# Patient Record
Sex: MALE | Race: WHITE | NOT HISPANIC OR LATINO | Employment: UNEMPLOYED
[De-identification: names, ages, dates, MRNs, and addresses within clinical notes are randomized per-mention and may not be internally consistent; named-entity substitution may affect disease eponyms.]

---
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
## 2018-02-23 ENCOUNTER — TELEPHONE (OUTPATIENT)
Dept: GASTROENTEROLOGY | Facility: CLINIC | Age: 18
End: 2018-02-23

## 2018-02-23 DIAGNOSIS — Q87.89 CAROLI SYNDROME: Primary | ICD-10-CM

## 2018-02-23 NOTE — TELEPHONE ENCOUNTER
Spoke to Mom. Solomon is scheduled for abdominal ultrasound on Thursday, 8/23 at 0930. [facility]. NPO 8 hours prior. Mom verbalized understanding.       DOV Logan RNCC

## 2018-05-08 DIAGNOSIS — I15.8 OTHER SECONDARY HYPERTENSION: ICD-10-CM

## 2018-05-08 RX ORDER — HYDROCHLOROTHIAZIDE 25 MG/1
25 TABLET ORAL 2 TIMES DAILY
Qty: 180 TABLET | Refills: 1 | Status: SHIPPED | OUTPATIENT
Start: 2018-05-08 | End: 2018-08-27

## 2018-08-23 ENCOUNTER — OFFICE VISIT (OUTPATIENT)
Dept: NEPHROLOGY | Facility: CLINIC | Age: 18
End: 2018-08-23
Payer: COMMERCIAL

## 2018-08-23 ENCOUNTER — HOSPITAL ENCOUNTER (OUTPATIENT)
Dept: ULTRASOUND IMAGING | Facility: CLINIC | Age: 18
Discharge: HOME | End: 2018-08-23
Attending: PEDIATRICS | Admitting: PEDIATRICS
Payer: COMMERCIAL

## 2018-08-23 VITALS
WEIGHT: 221.56 LBS | DIASTOLIC BLOOD PRESSURE: 73 MMHG | HEART RATE: 95 BPM | HEIGHT: 70 IN | SYSTOLIC BLOOD PRESSURE: 117 MMHG | BODY MASS INDEX: 31.72 KG/M2

## 2018-08-23 DIAGNOSIS — F41.1 GENERALIZED ANXIETY DISORDER: ICD-10-CM

## 2018-08-23 DIAGNOSIS — F90.9 ATTENTION DEFICIT HYPERACTIVITY DISORDER (ADHD), UNSPECIFIED ADHD TYPE: ICD-10-CM

## 2018-08-23 DIAGNOSIS — Q87.89 CAROLI SYNDROME: ICD-10-CM

## 2018-08-23 DIAGNOSIS — N18.2 CKD (CHRONIC KIDNEY DISEASE) STAGE 2, GFR 60-89 ML/MIN: ICD-10-CM

## 2018-08-23 DIAGNOSIS — Q61.19 AUTOSOMAL RECESSIVE POLYCYSTIC KIDNEY DISEASE AND CONGENITAL HEPATIC FIBROSIS (ARPKD/CHF): Primary | ICD-10-CM

## 2018-08-23 DIAGNOSIS — I15.1 HYPERTENSION SECONDARY TO OTHER RENAL DISORDERS: ICD-10-CM

## 2018-08-23 DIAGNOSIS — F39 MOOD DISORDER (H): ICD-10-CM

## 2018-08-23 LAB
AFP SERPL-MCNC: 2.1 UG/L (ref 0–8)
ALBUMIN SERPL-MCNC: 4.4 G/DL (ref 3.4–5)
ALP SERPL-CCNC: 74 U/L (ref 40–150)
ALT SERPL W P-5'-P-CCNC: 32 U/L (ref 0–70)
ANION GAP SERPL CALCULATED.3IONS-SCNC: 7 MMOL/L (ref 3–14)
AST SERPL W P-5'-P-CCNC: 19 U/L (ref 0–45)
BILIRUB DIRECT SERPL-MCNC: <0.1 MG/DL (ref 0–0.2)
BILIRUB SERPL-MCNC: 0.6 MG/DL (ref 0.2–1.3)
BUN SERPL-MCNC: 28 MG/DL (ref 7–30)
CALCIUM SERPL-MCNC: 9.8 MG/DL (ref 8.5–10.1)
CHLORIDE SERPL-SCNC: 103 MMOL/L (ref 94–109)
CHOLEST SERPL-MCNC: 232 MG/DL
CO2 SERPL-SCNC: 28 MMOL/L (ref 20–32)
CREAT SERPL-MCNC: 1.51 MG/DL (ref 0.66–1.25)
DEPRECATED CALCIDIOL+CALCIFEROL SERPL-MC: 42 UG/L (ref 20–75)
ERYTHROCYTE [DISTWIDTH] IN BLOOD BY AUTOMATED COUNT: 12.5 % (ref 10–15)
GFR SERPL CREATININE-BSD FRML MDRD: 59 ML/MIN/1.7M2
GGT SERPL-CCNC: 40 U/L (ref 0–75)
GLUCOSE SERPL-MCNC: 91 MG/DL (ref 70–99)
HBA1C MFR BLD: 5.9 % (ref 0–5.6)
HCT VFR BLD AUTO: 44.2 % (ref 40–53)
HDLC SERPL-MCNC: 34 MG/DL
HGB BLD-MCNC: 14.9 G/DL (ref 13.3–17.7)
LDLC SERPL CALC-MCNC: 145 MG/DL
MCH RBC QN AUTO: 29.7 PG (ref 26.5–33)
MCHC RBC AUTO-ENTMCNC: 33.7 G/DL (ref 31.5–36.5)
MCV RBC AUTO: 88 FL (ref 78–100)
NONHDLC SERPL-MCNC: 198 MG/DL
PHOSPHATE SERPL-MCNC: 3.1 MG/DL (ref 2.5–4.5)
PLATELET # BLD AUTO: 251 10E9/L (ref 150–450)
POTASSIUM SERPL-SCNC: 4 MMOL/L (ref 3.4–5.3)
PROLACTIN SERPL-MCNC: 34 UG/L (ref 2–18)
PROT SERPL-MCNC: 8.6 G/DL (ref 6.8–8.8)
PTH-INTACT SERPL-MCNC: 10 PG/ML (ref 18–80)
RBC # BLD AUTO: 5.01 10E12/L (ref 4.4–5.9)
SODIUM SERPL-SCNC: 139 MMOL/L (ref 133–144)
TRIGL SERPL-MCNC: 267 MG/DL
WBC # BLD AUTO: 7 10E9/L (ref 4–11)

## 2018-08-23 PROCEDURE — 76700 US EXAM ABDOM COMPLETE: CPT

## 2018-08-23 NOTE — PATIENT INSTRUCTIONS
[facility]  [facility] [place]      Pediatric Call Center [phone], option 1  Inez Collier RN Care Coordinator:  [phone]    After Hours Emergency:  [phone].  Ask for the on-call pediatric doctor for the specialty you are calling for be paged.    Prescription Renewals:  Your pharmacy must fax requests to [phone].  Please allow 2-3 days for prescriptions to be authorized.    If your physician has ordered an CT or MRI, you may schedule this test by calling [facility] [place] at [phone].

## 2018-08-23 NOTE — NURSING NOTE
"[facility] [[id number]]  Chief Complaint   Patient presents with     Kidney Problem     Follow-up on CKD.     Initial /73 (BP Location: Right arm, Patient Position: Sitting, Cuff Size: Adult Large)  Pulse 95  Ht 5' 10.35\" (178.7 cm)  Wt 221 lb 9 oz (100.5 kg)  BMI 31.47 kg/m2 Estimated body mass index is 31.47 kg/(m^2) as calculated from the following:    Height as of this encounter: 5' 10.35\" (178.7 cm).    Weight as of this encounter: 221 lb 9 oz (100.5 kg).  Medication Reconciliation: complete    "

## 2018-08-23 NOTE — LETTER
8/23/2018      RE: Solomon Wilcox  [address] [facility] [address]       Return Visit for ARPKD, CKD stage II, hypertension       Chief Complaint:  Chief Complaint   Patient presents with     Kidney Problem     Follow-up on CKD.       HPI:    I had the pleasure of seeing Solomon Wilcox in the Pediatric Nephrology Clinic today for follow-up of ARPKD, CKD stage II, hypertension. Solomon is a 18 year old male accompanied by his mother.  Solomon Wilcox was last seen in the renal clinic on 8/10/17. Since then he has been doing well with no hospitalizations and no surgeries. Solomon says he is taking his medications when he is at school, however mom has doubts and knows that he missed at least 2 days this Summer. Continues to have significant conflict with mother, particularly around trust issues. He is not having fatigue, gross hematuria, UTIs, abdominal pain, headaches, back pain. Labs were requested from Dr. Laird's office and performed today. Growth chart was reviewed. He has gained ~6.7kg in the past year. Solomon was unable to name either of his blood pressure medications.     Review of Systems:  A comprehensive review of systems was performed and found to be negative other than noted in the HPI.    Allergies:  Solomon has No Known Allergies..    Active Medications:  Current Outpatient Prescriptions   Medication Sig Dispense Refill     cholecalciferol (VITAMIN  -D) 1000 UNITS capsule Take 1 capsule by mouth daily       citalopram (CELEXA) 40 MG tablet Take 1 tablet by mouth daily.       cyanocolbalamin (VITAMIN  B-12) 500 MCG tablet Take 500 mcg by mouth daily       guanFACINE HCl (INTUNIV) 3 MG TB24 Take 1 tablet by mouth daily.       hydrochlorothiazide (HYDRODIURIL) 25 MG tablet Take 1 tablet (25 mg) by mouth 2 times daily 180 tablet 1     lisinopril (PRINIVIL/ZESTRIL) 10 MG tablet Take 1 tablet (10 mg) by mouth daily 90 tablet 1     risperiDONE (RISPERDAL) 0.5 MG tablet Take 0.5 mg by mouth 2 times daily    "    ursodiol (ACTIGALL) 300 MG capsule Take 1 capsule (300 mg) by mouth 2 times daily 240 capsule 4     adapalene (DIFFERIN) 0.1 % cream Apply topically At Bedtime Apply as directed          Immunizations:    There is no immunization history on file for this patient.     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     Liver disease, cystic, congenital      Short stature     Treated with growth hormone     Unspecified hypertensive kidney disease with chronic kidney disease stage I through stage IV, or unspecified(403.90)          PSHx:    Past Surgical History:   Procedure Laterality Date     LAPAROSCOPIC CHOLECYSTECTOMY N/A 10/24/2014    Procedure: LAPAROSCOPIC CHOLECYSTECTOMY;  Surgeon: David Gibson MD;  Location: [facility] OR     NOSE SURGERY         FHx:  Family History   Problem Relation Age of Onset     Hypertension Father      started in his 20s     Lipids Father      high cholesterol     Myocardial Infarction Paternal Grandfather        SHx:  Social History   Substance Use Topics     Smoking status: Never Smoker     Smokeless tobacco: Never Used     Alcohol use No     Social History     Social History Narrative    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  He lives at home with his parents and younger sister in the Summer. He graduated from high school and attended [employer] last year and will start again in the Fall.        Physical Exam:    /73 (BP Location: Right arm, Patient Position: Sitting, Cuff Size: Adult Large)  Pulse 95  Ht 5' 10.35\" (178.7 cm)  Wt 221 lb 9 oz (100.5 kg)  BMI 31.47 kg/m2     Exam:  Constitutional: healthy, alert and no distress  Head: Normocephalic. No masses, lesions, or abnormalities  Neck: Neck supple. No adenopathy. Thyroid symmetric, " normal size,  EYE: VANESSA, EOMI, no periorbital edema  ENT: ENT exam normal, no neck nodes   Cardiovascular: negative, RRR. No murmurs, clicks gallops or rub  Respiratory: negative,  Lungs clear  Gastrointestinal: Abdomen soft, non-tender. BS normal. No masses, organomegaly, kidneys not palpable  : Deferred  Musculoskeletal: extremities normal- no gross deformities noted, gait normal and normal muscle tone  Skin: no suspicious lesions or rashes  Neurologic: Gait normal.   Psychiatric: mentation appears normal and affect normal    Labs and Imaging:  Results for orders placed or performed in visit on 08/23/18   Renal panel   Result Value Ref Range    Sodium 139 133 - 144 mmol/L    Potassium 4.0 3.4 - 5.3 mmol/L    Chloride 103 94 - 109 mmol/L    Carbon Dioxide 28 20 - 32 mmol/L    Anion Gap 7 3 - 14 mmol/L    Glucose 91 70 - 99 mg/dL    Urea Nitrogen 28 7 - 30 mg/dL    Creatinine 1.51 (H) 0.66 - 1.25 mg/dL    GFR Estimate 59 (L) >60 mL/min/1.7m2    GFR Estimate If Black 71 >60 mL/min/1.7m2    Calcium 9.8 8.5 - 10.1 mg/dL    Phosphorus 3.1 2.5 - 4.5 mg/dL    Albumin 4.4 3.4 - 5.0 g/dL   CBC with platelets   Result Value Ref Range    WBC 7.0 4.0 - 11.0 10e9/L    RBC Count 5.01 4.4 - 5.9 10e12/L    Hemoglobin 14.9 13.3 - 17.7 g/dL    Hematocrit 44.2 40.0 - 53.0 %    MCV 88 78 - 100 fl    MCH 29.7 26.5 - 33.0 pg    MCHC 33.7 31.5 - 36.5 g/dL    RDW 12.5 10.0 - 15.0 %    Platelet Count 251 150 - 450 10e9/L   Hemoglobin A1c   Result Value Ref Range    Hemoglobin A1C 5.9 (H) 0 - 5.6 %   Lipid panel   Result Value Ref Range    Cholesterol 232 (H) <200 mg/dL    Triglycerides 267 (H) <150 mg/dL    HDL Cholesterol 34 (L) >39 mg/dL    LDL Cholesterol Calculated 145 (H) <100 mg/dL    Non HDL Cholesterol 198 (H) <130 mg/dL   Prolactin   Result Value Ref Range    Prolactin 34 (H) 2 - 18 ug/L   Alkaline phosphatase   Result Value Ref Range    Alkaline Phosphatase 74 40 - 150 U/L   ALT   Result Value Ref Range    ALT 32 0 - 70 U/L    AST   Result Value Ref Range    AST 19 0 - 45 U/L   Bilirubin  total   Result Value Ref Range    Bilirubin Total 0.6 0.2 - 1.3 mg/dL   Bilirubin direct   Result Value Ref Range    Bilirubin Direct <0.1 0.0 - 0.2 mg/dL   Protein total   Result Value Ref Range    Protein Total 8.6 6.8 - 8.8 g/dL   Parathyroid Hormone Intact   Result Value Ref Range    Parathyroid Hormone Intact 10 (L) 18 - 80 pg/mL   Vitamin D Deficiency   Result Value Ref Range    Vitamin D Deficiency screening 42 20 - 75 ug/L   GGT   Result Value Ref Range    GGT 40 0 - 75 U/L   AFP tumor marker   Result Value Ref Range    Alpha Fetoprotein 2.1 0 - 8 ug/L     EXAMINATION: US ABDOMEN COMPLETE WITH DOPPLER, 8/23/2018 10:12 AM      COMPARISON: MR 8/4/2017     HISTORY: Caroli syndrome     Findings:  Liver: The liver demonstrates normal homogeneous echotexture. No  evidence of a focal hepatic mass.      Extrahepatic portal vein flow is antegrade, measuring 34 cm/sec.  Right portal vein flow is antegrade, measuring 32 cm/sec.  Left portal vein flow is antegrade, measuring 92 cm/sec.     Flow in the hepatic artery is towards the liver and:  62 cm/sec peak systolic  0.59 resistive index.      The splenic vein is patent and flow is towards the liver.  The left,  middle, and right hepatic veins are patent with flow towards the IVC.  The IVC is patent with flow towards the heart.   The visualized aorta  is not dilated.     Gallbladder: The gallbladder has been surgically removed.     Bile Ducts: Redemonstration of cystic dilation of the intrahepatic  bile ducts, measuring up to 6.2 x 7.0 x 7.9 cm. The common bile duct  measures 5.8 mm in diameter     Pancreas: Visualized portions of the head and body of the pancreas are  unremarkable.      Kidneys: Both kidneys are of normal echotexture, without  hydronephrosis. Multiple simple cysts in each kidney, measuring up to  1.8 cm on the right and 1.7 cm on the left. The craniocaudal  dimensions are: right- 12.0 cm,  left- 10.6 cm.     Spleen: The spleen measures 12.5 cm in sagittal dimension.     Fluid: No evidence of ascites or pleural effusions.         Impression:   1.  Hepatomegaly with ductal dilatation, compatible with Caroli's.  Doppler evaluation is normal.  2.  Stable borderline to mildly dilated extrahepatic bile duct.  3.  Polycystic kidney disease.  4.  Postoperative changes of cholecystectomy.     I personally reviewed results of laboratory evaluation, imaging studies and past medical records that were available during this outpatient visit.      Assessment and Plan:    Solomon is a 18 year old young man with ARPKD who is doing well medically.   1. CKD stage II due to ARPKD: Estimated GFR is 66 ml/min/1.73m2 by CKD-EPI equation and similar to a year ago. Creatinine has been very stable over the past few years. Recheck labs every 6 months (lab only appointment). I will plan to see him back in renal clinic in 1 year.  He has no evidence for anemia or hyperparathyroidism related to his CKD. He was encourage to learn the names of his medications and what they do. We reviewed his blood pressure medications today. Solomon needs to start taking control of his medications including filling his med box, calling for refills, and taking his medications without reminders.   2. Hypertension with CKD stage II: Blood pressures are under good control on lisinopril and HCTZ. Goal blood pressures are <120/80. He should have blood pressures checked at each clinic visit. He does not have evidence for hyperkalemia as a side effect of his lisinopril. He is not having dizziness or lightheadedness. He should stay well hydrated as he is at risk of acute kidney injury if dehydrated while taking lisinopril.   3. Liver cysts: Followed by Dr. Talavera in GI clinic.  Continue urodiol per GI. Follow up appointment in 2 weeks. Labs obtained today.   4. Splenic cysts: Seen on MRI  5. Elevated cholesterol: Recommend follow up with internal medicine or  "family practice provider to establish primary care and to determine if lipid lowering therapy would be beneficial.   6. Transition: We discussed transition planning. Solomon can transition to an adult nephrologist at any time. I recommend providers at the [facility] Nephrology clinic. I am able to see him for one more visit in 1 year if he would like to.        Labs requested by Dr. Laird were faxed to his office.      Patient Education: During this visit I discussed in detail the patient s symptoms, physical exam and evaluation results findings, tentative diagnosis as well as the treatment plan (Including but not limited to possible side effects and complications related to the disease, treatment modalities and intervention(s). Family expressed understanding and consent. Family was receptive and ready to learn; no apparent learning barriers were identified.    Follow up: Return in about 1 year (around 8/23/2019). Please return sooner should Solomon become symptomatic.          Sincerely,    Taina Ni MD   Pediatric Nephrology    CC:   Patient Care Team:  Brando Yost as PCP - General  Abhinav Laird MD as MD (Psychiatry)      Copy to patient  MORGAN CARLOS Daniel \"Adarsh\"  [address] [facility] [address]      "

## 2018-08-23 NOTE — MR AVS SNAPSHOT
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  Ask for the on-call pediatric doctor for the specialty you are calling for be paged.    Prescription Renewals:  Your pharmacy must fax requests to [phone].  Please allow 2-3 days for prescriptions to be authorized.    If your physician has ordered an CT or MRI, you may schedule this test by calling [facility] in [place] at [phone].            Follow-ups after your visit        Follow-up notes from your care team     Return in about 1 year (around 2019).      Your next 10 appointments already scheduled     Aug 23, 2018  9:30 AM CDT   US [facility]BDOM[facility] URUS[facility]   [facility]    [address] [place] [address] [phone]           Please bring a list of your medicines (including vitamins, minerals and over-the-counter drugs). Also, tell your doctor about any allergies you may have. Wear comfortable clothes and leave your valuables at home.  Adults: No eating or drinking for 8 hours before the exam. You may take medicine with a small sip of water.   "Children: - Infants, breast-fed: may have breast milk up to 2 hours before exam. - Infants, formula: may have bottle until 4 hours before exam. - Children 1-5 years: No food or drink for 4 hours before exam. - Children 6 -12 years: No food or drink for 6 hours before exam. - Children over 12 years: No food or drink for 8 hours before exam. - J Tube Fed: No need to stop feedings.  Please call the Imaging Department at your exam site with any questions.            Aug 28, 2018  4:00 PM CDT   Return Visit with Shelby Talavera MD   [facility] ([facility])    [address]  [address]  [facility] [address] [phone]              Who to contact     Please call your clinic at [phone] to:    Ask questions about your health    Make or cancel appointments    Discuss your medicines    Learn about your test results    Speak to your doctor            Additional Information About Your Visit        [employer] Information     [employer] gives you secure access to your electronic health record. If you see a primary care provider, you can also send messages to your care team and make appointments. If you have questions, please call your primary care clinic.  If you do not have a primary care provider, please call [phone] and they will assist you.      [employer] is an electronic gateway that provides easy, online access to your medical records. With [employer], you can request a clinic appointment, read your test results, renew a prescription or communicate with your care team.     To access your existing account, please contact your [facility] or call [phone] for assistance.        Care EveryWhere ID     This is your Care EveryWhere ID. This could be used by other organizations to access your [facility] medical records  [id number]        Your Vitals Were     Pulse Height BMI (Body Mass Index)             95 5' 10.35\" (178.7 cm) " 31.47 kg/m2          Blood Pressure from Last 3 Encounters:   08/23/18 117/73   08/23/17 121/66   08/10/17 119/77    Weight from Last 3 Encounters:   08/23/18 221 lb 9 oz (100.5 kg)   08/23/17 206 lb 12.7 oz (93.8 kg) (94 %)*   08/10/17 202 lb 2.6 oz (91.7 kg) (93 %)*     * Growth percentiles are based on [facility] 2-20 Years data.              We Performed the Following     AFP tumor marker     Alkaline phosphatase     ALT     AST     Bilirubin  total     Bilirubin direct     CBC with platelets     GGT     Hemoglobin A1c     Lipid panel     Parathyroid Hormone Intact     Prolactin     Protein total     Renal panel     Vitamin D Deficiency        Primary Care Provider Office Phone # Fax #    Brando Yost [phone]       [facility] [address] [address]        Equal Access to Services     MAJOR BUCK : Hadii sunil Ramos, waaxda luqaracely, qaybta kaalmada violet, sergio garcia . So [facility] [phone].    ATENCIÓN: Si habla español, tiene a keyes disposición servicios gratuitos de asistencia lingüística. Oscar al [phone].    We comply with applicable federal civil rights laws and [place] laws. We do not discriminate on the basis of race, color, national origin, age, disability, sex, sexual orientation, or gender identity.            Thank you!     Thank you for choosing [facility]  for your care. Our goal is always to provide you with excellent care. Hearing back from our patients is one way we can continue to improve our services. Please take a few minutes to complete the written survey that you may receive in the mail after your visit with us. Thank you!             Your Updated Medication List - Protect others around you: Learn how to safely use, store and throw away your medicines at www.disposemymeds.org.          This list is accurate as of 8/23/18  8:34 AM.  Always use your most recent  med list.                   Brand Name Dispense Instructions for use Diagnosis    adapalene 0.1 % cream    DIFFERIN     Apply topically At Bedtime Apply as directed        cholecalciferol 1000 units capsule    vitamin  -D     Take 1 capsule by mouth daily        citalopram 40 MG tablet    celeXA     Take 1 tablet by mouth daily.        cyanocolbalamin 500 MCG tablet    vitamin  B-12     Take 500 mcg by mouth daily        hydrochlorothiazide 25 MG tablet    HYDRODIURIL    180 tablet    Take 1 tablet (25 mg) by mouth 2 times daily    Other secondary hypertension       INTUNIV 3 MG Tb24 24 hr tablet   Generic drug:  guanFACINE HCl      Take 1 tablet by mouth daily.        lisinopril 10 MG tablet    PRINIVIL/ZESTRIL    90 tablet    Take 1 tablet (10 mg) by mouth daily    Other secondary hypertension       risperiDONE 0.5 MG tablet    risperDAL     Take 0.5 mg by mouth 2 times daily        ursodiol 300 MG capsule    ACTIGALL    240 capsule    Take 1 capsule (300 mg) by mouth 2 times daily    Polycystic kidney disease

## 2018-08-23 NOTE — PROGRESS NOTES
Return Visit for ARPKD, CKD stage II, hypertension       Chief Complaint:  Chief Complaint   Patient presents with     Kidney Problem     Follow-up on CKD.       HPI:    I had the pleasure of seeing Solomon Wilcox in the Pediatric Nephrology Clinic today for follow-up of ARPKD, CKD stage II, hypertension. Solomon is a 18 year old male accompanied by his mother.  Solomon Wilcox was last seen in the renal clinic on 8/10/17. Since then he has been doing well with no hospitalizations and no surgeries. Solomon says he is taking his medications when he is at school, however mom has doubts and knows that he missed at least 2 days this Summer. Continues to have significant conflict with mother, particularly around trust issues. He is not having fatigue, gross hematuria, UTIs, abdominal pain, headaches, back pain. Labs were requested from Dr. Laird's office and performed today. Growth chart was reviewed. He has gained ~6.7kg in the past year. Solomon was unable to name either of his blood pressure medications.     Review of Systems:  A comprehensive review of systems was performed and found to be negative other than noted in the HPI.    Allergies:  Solomon has No Known Allergies..    Active Medications:  Current Outpatient Prescriptions   Medication Sig Dispense Refill     cholecalciferol (VITAMIN  -D) 1000 UNITS capsule Take 1 capsule by mouth daily       citalopram (CELEXA) 40 MG tablet Take 1 tablet by mouth daily.       cyanocolbalamin (VITAMIN  B-12) 500 MCG tablet Take 500 mcg by mouth daily       guanFACINE HCl (INTUNIV) 3 MG TB24 Take 1 tablet by mouth daily.       hydrochlorothiazide (HYDRODIURIL) 25 MG tablet Take 1 tablet (25 mg) by mouth 2 times daily 180 tablet 1     lisinopril (PRINIVIL/ZESTRIL) 10 MG tablet Take 1 tablet (10 mg) by mouth daily 90 tablet 1     risperiDONE (RISPERDAL) 0.5 MG tablet Take 0.5 mg by mouth 2 times daily       ursodiol (ACTIGALL) 300 MG capsule Take 1 capsule (300 mg) by mouth 2 times daily 240  "capsule 4     adapalene (DIFFERIN) 0.1 % cream Apply topically At Bedtime Apply as directed          Immunizations:    There is no immunization history on file for this patient.     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     Liver disease, cystic, congenital      Short stature     Treated with growth hormone     Unspecified hypertensive kidney disease with chronic kidney disease stage I through stage IV, or unspecified(403.90)          PSHx:    Past Surgical History:   Procedure Laterality Date     LAPAROSCOPIC CHOLECYSTECTOMY N/A 10/24/2014    Procedure: LAPAROSCOPIC CHOLECYSTECTOMY;  Surgeon: David Gibson MD;  Location: [facility] OR     NOSE SURGERY         FHx:  Family History   Problem Relation Age of Onset     Hypertension Father      started in his 20s     Lipids Father      high cholesterol     Myocardial Infarction Paternal Grandfather        SHx:  Social History   Substance Use Topics     Smoking status: Never Smoker     Smokeless tobacco: Never Used     Alcohol use No     Social History     Social History Narrative    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  He lives at home with his parents and younger sister in the Summer. He graduated from high school and attended [employer] last year and will start again in the Fall.        Physical Exam:    /73 (BP Location: Right arm, Patient Position: Sitting, Cuff Size: Adult Large)  Pulse 95  Ht 5' 10.35\" (178.7 cm)  Wt 221 lb 9 oz (100.5 kg)  BMI 31.47 kg/m2     Exam:  Constitutional: healthy, alert and no distress  Head: Normocephalic. No masses, lesions, or abnormalities  Neck: Neck supple. No adenopathy. Thyroid symmetric, normal size,  EYE: VANESSA, EOMI, no periorbital edema  ENT: ENT exam normal, no neck nodes "   Cardiovascular: negative, RRR. No murmurs, clicks gallops or rub  Respiratory: negative,  Lungs clear  Gastrointestinal: Abdomen soft, non-tender. BS normal. No masses, organomegaly, kidneys not palpable  : Deferred  Musculoskeletal: extremities normal- no gross deformities noted, gait normal and normal muscle tone  Skin: no suspicious lesions or rashes  Neurologic: Gait normal.   Psychiatric: mentation appears normal and affect normal    Labs and Imaging:  Results for orders placed or performed in visit on 08/23/18   Renal panel   Result Value Ref Range    Sodium 139 133 - 144 mmol/L    Potassium 4.0 3.4 - 5.3 mmol/L    Chloride 103 94 - 109 mmol/L    Carbon Dioxide 28 20 - 32 mmol/L    Anion Gap 7 3 - 14 mmol/L    Glucose 91 70 - 99 mg/dL    Urea Nitrogen 28 7 - 30 mg/dL    Creatinine 1.51 (H) 0.66 - 1.25 mg/dL    GFR Estimate 59 (L) >60 mL/min/1.7m2    GFR Estimate If Black 71 >60 mL/min/1.7m2    Calcium 9.8 8.5 - 10.1 mg/dL    Phosphorus 3.1 2.5 - 4.5 mg/dL    Albumin 4.4 3.4 - 5.0 g/dL   CBC with platelets   Result Value Ref Range    WBC 7.0 4.0 - 11.0 10e9/L    RBC Count 5.01 4.4 - 5.9 10e12/L    Hemoglobin 14.9 13.3 - 17.7 g/dL    Hematocrit 44.2 40.0 - 53.0 %    MCV 88 78 - 100 fl    MCH 29.7 26.5 - 33.0 pg    MCHC 33.7 31.5 - 36.5 g/dL    RDW 12.5 10.0 - 15.0 %    Platelet Count 251 150 - 450 10e9/L   Hemoglobin A1c   Result Value Ref Range    Hemoglobin A1C 5.9 (H) 0 - 5.6 %   Lipid panel   Result Value Ref Range    Cholesterol 232 (H) <200 mg/dL    Triglycerides 267 (H) <150 mg/dL    HDL Cholesterol 34 (L) >39 mg/dL    LDL Cholesterol Calculated 145 (H) <100 mg/dL    Non HDL Cholesterol 198 (H) <130 mg/dL   Prolactin   Result Value Ref Range    Prolactin 34 (H) 2 - 18 ug/L   Alkaline phosphatase   Result Value Ref Range    Alkaline Phosphatase 74 40 - 150 U/L   ALT   Result Value Ref Range    ALT 32 0 - 70 U/L   AST   Result Value Ref Range    AST 19 0 - 45 U/L   Bilirubin  total   Result Value Ref  Range    Bilirubin Total 0.6 0.2 - 1.3 mg/dL   Bilirubin direct   Result Value Ref Range    Bilirubin Direct <0.1 0.0 - 0.2 mg/dL   Protein total   Result Value Ref Range    Protein Total 8.6 6.8 - 8.8 g/dL   Parathyroid Hormone Intact   Result Value Ref Range    Parathyroid Hormone Intact 10 (L) 18 - 80 pg/mL   Vitamin D Deficiency   Result Value Ref Range    Vitamin D Deficiency screening 42 20 - 75 ug/L   GGT   Result Value Ref Range    GGT 40 0 - 75 U/L   AFP tumor marker   Result Value Ref Range    Alpha Fetoprotein 2.1 0 - 8 ug/L     EXAMINATION: US ABDOMEN COMPLETE WITH DOPPLER, 8/23/2018 10:12 AM      COMPARISON: MR 8/4/2017     HISTORY: Caroli syndrome     Findings:  Liver: The liver demonstrates normal homogeneous echotexture. No  evidence of a focal hepatic mass.      Extrahepatic portal vein flow is antegrade, measuring 34 cm/sec.  Right portal vein flow is antegrade, measuring 32 cm/sec.  Left portal vein flow is antegrade, measuring 92 cm/sec.     Flow in the hepatic artery is towards the liver and:  62 cm/sec peak systolic  0.59 resistive index.      The splenic vein is patent and flow is towards the liver.  The left,  middle, and right hepatic veins are patent with flow towards the IVC.  The IVC is patent with flow towards the heart.   The visualized aorta  is not dilated.     Gallbladder: The gallbladder has been surgically removed.     Bile Ducts: Redemonstration of cystic dilation of the intrahepatic  bile ducts, measuring up to 6.2 x 7.0 x 7.9 cm. The common bile duct  measures 5.8 mm in diameter     Pancreas: Visualized portions of the head and body of the pancreas are  unremarkable.      Kidneys: Both kidneys are of normal echotexture, without  hydronephrosis. Multiple simple cysts in each kidney, measuring up to  1.8 cm on the right and 1.7 cm on the left. The craniocaudal  dimensions are: right- 12.0 cm, left- 10.6 cm.     Spleen: The spleen measures 12.5 cm in sagittal dimension.     Fluid:  No evidence of ascites or pleural effusions.         Impression:   1.  Hepatomegaly with ductal dilatation, compatible with Caroli's.  Doppler evaluation is normal.  2.  Stable borderline to mildly dilated extrahepatic bile duct.  3.  Polycystic kidney disease.  4.  Postoperative changes of cholecystectomy.     I personally reviewed results of laboratory evaluation, imaging studies and past medical records that were available during this outpatient visit.      Assessment and Plan:    Solomon is a 18 year old young man with ARPKD who is doing well medically.   1. CKD stage II due to ARPKD: Estimated GFR is 66 ml/min/1.73m2 by CKD-EPI equation and similar to a year ago. Creatinine has been very stable over the past few years. Recheck labs every 6 months (lab only appointment). I will plan to see him back in renal clinic in 1 year.  He has no evidence for anemia or hyperparathyroidism related to his CKD. He was encourage to learn the names of his medications and what they do. We reviewed his blood pressure medications today. Solomon needs to start taking control of his medications including filling his med box, calling for refills, and taking his medications without reminders.   2. Hypertension with CKD stage II: Blood pressures are under good control on lisinopril and HCTZ. Goal blood pressures are <120/80. He should have blood pressures checked at each clinic visit. He does not have evidence for hyperkalemia as a side effect of his lisinopril. He is not having dizziness or lightheadedness. He should stay well hydrated as he is at risk of acute kidney injury if dehydrated while taking lisinopril.   3. Liver cysts: Followed by Dr. Talavera in GI clinic.  Continue urodiol per GI. Follow up appointment in 2 weeks. Labs obtained today.   4. Splenic cysts: Seen on MRI  5. Elevated cholesterol: Recommend follow up with internal medicine or family practice provider to establish primary care and to determine if lipid lowering  "therapy would be beneficial.   6. Transition: We discussed transition planning. Solomon can transition to an adult nephrologist at any time. I recommend providers at the [facility]. I am able to see him for one more visit in 1 year if he would like to.        Labs requested by Dr. Laird were faxed to his office.      Patient Education: During this visit I discussed in detail the patient s symptoms, physical exam and evaluation results findings, tentative diagnosis as well as the treatment plan (Including but not limited to possible side effects and complications related to the disease, treatment modalities and intervention(s). Family expressed understanding and consent. Family was receptive and ready to learn; no apparent learning barriers were identified.    Follow up: Return in about 1 year (around 8/23/2019). Please return sooner should Solomon become symptomatic.          Sincerely,    Taina Ni MD   Pediatric Nephrology    CC:   Patient Care Team:  Marvin Yost as PCP - General  Marvin Yost as Referring Physician (Internal Medicine)  Taina Ni MD as MD (Pediatric Nephrology)  Abhinav Laird MD as MD (Psychiatry)  MARVIN YOST    Copy to patient  MORGAN CARLOS Daniel \"Adarsh\"  [address] [facility] [address]    "

## 2018-08-27 DIAGNOSIS — N18.2 CHRONIC KIDNEY DISEASE, STAGE 2 (MILD): ICD-10-CM

## 2018-08-27 DIAGNOSIS — I15.8 OTHER SECONDARY HYPERTENSION: ICD-10-CM

## 2018-08-27 DIAGNOSIS — Q61.19 AUTOSOMAL RECESSIVE POLYCYSTIC KIDNEY DISEASE AND CONGENITAL HEPATIC FIBROSIS (ARPKD/CHF): Primary | ICD-10-CM

## 2018-08-27 RX ORDER — LISINOPRIL 10 MG/1
10 TABLET ORAL DAILY
Qty: 90 TABLET | Refills: 1 | Status: SHIPPED | OUTPATIENT
Start: 2018-08-27 | End: 2019-02-19

## 2018-08-27 RX ORDER — HYDROCHLOROTHIAZIDE 25 MG/1
25 TABLET ORAL 2 TIMES DAILY
Qty: 180 TABLET | Refills: 1 | Status: SHIPPED | OUTPATIENT
Start: 2018-08-27 | End: 2019-02-20

## 2018-08-28 ENCOUNTER — OFFICE VISIT (OUTPATIENT)
Dept: GASTROENTEROLOGY | Facility: CLINIC | Age: 18
End: 2018-08-28
Payer: COMMERCIAL

## 2018-08-28 ENCOUNTER — TELEPHONE (OUTPATIENT)
Dept: NEPHROLOGY | Facility: CLINIC | Age: 18
End: 2018-08-28

## 2018-08-28 ENCOUNTER — MEDICAL CORRESPONDENCE (OUTPATIENT)
Dept: HEALTH INFORMATION MANAGEMENT | Facility: CLINIC | Age: 18
End: 2018-08-28

## 2018-08-28 VITALS
DIASTOLIC BLOOD PRESSURE: 68 MMHG | SYSTOLIC BLOOD PRESSURE: 116 MMHG | HEART RATE: 110 BPM | HEIGHT: 70 IN | BODY MASS INDEX: 31.72 KG/M2 | WEIGHT: 221.56 LBS

## 2018-08-28 DIAGNOSIS — Q61.19 AUTOSOMAL RECESSIVE POLYCYSTIC KIDNEY DISEASE AND CONGENITAL HEPATIC FIBROSIS (ARPKD/CHF): ICD-10-CM

## 2018-08-28 DIAGNOSIS — Q44.5 CAROLI DISEASE: Primary | ICD-10-CM

## 2018-08-28 DIAGNOSIS — Z53.9 ERRONEOUS ENCOUNTER--DISREGARD: Primary | ICD-10-CM

## 2018-08-28 ASSESSMENT — PAIN SCALES - GENERAL: PAINLEVEL: NO PAIN (0)

## 2018-08-28 NOTE — TELEPHONE ENCOUNTER
----- Message from Taina Ni MD sent at 8/27/2018 11:45 PM CDT -----  Please let Solomon and mother know:   Creatinine overall stable at 1.51 (was 1.41 last year and 1.44 before that). Hemoglobin and other electrolytes normal. His cholesterol is further elevated. I recommend that he see his primary provider to discuss whether treatment of his high cholesterol is indicated. May need to see internal medicine or family practice rather than pediatrics.     Repeat labs in 6 months. I'll enter what I need as future orders.     Please fax separately all of the labs [facility] at [phone]. Solomon has an appointment with him on 8/28.     Taina

## 2018-08-28 NOTE — NURSING NOTE
"[facility] [[id number]]  Chief Complaint   Patient presents with     Gastrointestinal Problem     Follow-up on ARPKD.     Initial /68 (BP Location: Right arm, Patient Position: Sitting, Cuff Size: Adult Large)  Pulse 110  Ht 5' 10.35\" (178.7 cm)  Wt 221 lb 9 oz (100.5 kg)  BMI 31.47 kg/m2 Estimated body mass index is 31.47 kg/(m^2) as calculated from the following:    Height as of this encounter: 5' 10.35\" (178.7 cm).    Weight as of this encounter: 221 lb 9 oz (100.5 kg).  Medication Reconciliation: complete    "

## 2018-08-28 NOTE — MR AVS SNAPSHOT
After Visit Summary   2018    Solomon Wilcox    MRN: [id number]           Patient Information     Date Of Birth          2000        Visit Information        Provider Department      2018 1:30 PM Shelby Talavera MD [facility]        Today's Diagnoses     Caroli disease    -  1      Care Instructions    [facility]  Pediatric Specialty Clinic [place]      Pediatric Call Center [phone], option 1  Inez Collier RN Care Coordinator:  [phone]    After Hours Emergency:  [phone].  Ask for the on-call pediatric doctor for the specialty you are calling for be paged.    Prescription Renewals:  Your pharmacy must fax requests to [phone].  Please allow 2-3 days for prescriptions to be authorized.    If your physician has ordered an CT or MRI, you may schedule this test by calling [facility] Radiology in [place] at [phone].            Follow-ups after your visit        Follow-up notes from your care team     Return in about 1 year (around 2019) for Caitlin.      Future tests that were ordered for you today     Open Future Orders        Priority Expected Expires Ordered    US Abdomen Limited Routine  10/28/2019 2018    CBC with platelets differential Routine 2019    Hepatic panel Routine 2019    GGT Routine 2019    Renal panel Routine  2019    Hemoglobin Routine  2019    Parathyroid Hormone Intact Routine  2019            Who to contact     Please call your clinic at [phone] to:    Ask questions about your health    Make or cancel appointments    Discuss your medicines    Learn about your test results    Speak to your doctor            Additional Information About Your Visit        "[employer]hart Information     [employer] gives you secure access to your electronic health  "record. If you see a primary care provider, you can also send messages to your care team and make appointments. If you have questions, please call your primary care clinic.  If you do not have a primary care provider, please call [phone] and they will assist you.      "[employer] is an electronic gateway that provides easy, online access to your medical records. With "[employer] you can request a clinic appointment, read your test results, renew a prescription or communicate with your care team.     To access your existing account, please contact your [facility] or call [phone] for assistance.        Care EveryWhere ID     This is your Care EveryWhere ID. This could be used by other organizations to access your [facility] [facility] medical records  [id number]        Your Vitals Were     Pulse Height BMI (Body Mass Index)             110 5' 10.35\" (178.7 cm) 31.47 kg/m2          Blood Pressure from Last 3 Encounters:   08/28/18 116/68   08/23/18 117/73   08/23/17 121/66    Weight from Last 3 Encounters:   08/28/18 221 lb 9 oz (100.5 kg)   08/23/18 221 lb 9 oz (100.5 kg)   08/23/17 206 lb 12.7 oz (93.8 kg) (94 %)*     * Growth percentiles are based on CDC 2-20 Years data.               Primary Care Provider Office Phone # Fax #    Brando Yost [phone]       [facility] [address] [address]        Equal Access to Services     MAJOR BUCK : Hadii sunil ku hadasho Soomaali, waaxda luqadaha, qaybta kaalmada adeegyada, sergio garcia . So [facility] [phone].    ATENCIÓN: Si habla español, tiene a keyes disposición servicios gratuitos de asistencia lingüística. Llame al [phone].    We comply with applicable federal civil rights laws and [place] laws. We do not discriminate on the basis of race, color, national origin, age, disability, sex, sexual orientation, or gender identity.            Thank you!     Thank you for " choosing [facility]  for your care. Our goal is always to provide you with excellent care. Hearing back from our patients is one way we can continue to improve our services. Please take a few minutes to complete the written survey that you may receive in the mail after your visit with us. Thank you!             Your Updated Medication List - Protect others around you: Learn how to safely use, store and throw away your medicines at www.disposemymeds.org.          This list is accurate as of 8/28/18  2:09 PM.  Always use your most recent med list.                   Brand Name Dispense Instructions for use Diagnosis    adapalene 0.1 % cream    DIFFERIN     Apply topically At Bedtime Apply as directed        cholecalciferol 1000 units capsule    vitamin  -D     Take 1 capsule by mouth daily        citalopram 40 MG tablet    celeXA     Take 1 tablet by mouth daily.        cyanocolbalamin 500 MCG tablet    vitamin  B-12     Take 500 mcg by mouth daily        hydrochlorothiazide 25 MG tablet    HYDRODIURIL    180 tablet    Take 1 tablet (25 mg) by mouth 2 times daily    Other secondary hypertension       INTUNIV 3 MG Tb24 24 hr tablet   Generic drug:  guanFACINE HCl      Take 1 tablet by mouth daily.        lisinopril 10 MG tablet    PRINIVIL/ZESTRIL    90 tablet    Take 1 tablet (10 mg) by mouth daily    Other secondary hypertension       risperiDONE 0.5 MG tablet    risperDAL     Take 0.5 mg by mouth 2 times daily        ursodiol 300 MG capsule    ACTIGALL    240 capsule    Take 1 capsule (300 mg) by mouth 2 times daily    Polycystic kidney disease

## 2018-08-28 NOTE — LETTER
8/28/2018      RE: Solomon Wilcox  [address] 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 Last seen in August 2017.     INTERVAL Hx: Solomon returns today with his mother for scheduled follow-up. Just saw Peds Nephrology last week. He had labs at that visit on 8/23, as well as an abdominal US with Doppler.     GI labs were unchanged. Transaminases and bilirubin are still WNL. AFP was 2.1 compared to 3.2 on previous check in December 2017. CBC is normal including platelet count of 251 (previous check was 257).     Ultrasound demonstrated stable dilation of the intrahepatic and extrahepatic bilie ducts.     Solomon has been feeling well. He has no concerns today. He reports no abdominal pain, nausea, fatigue, or abnormal bruising or bleeding.     He drinks socially in moderation. He does not use tobacco.     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, 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     Liver disease, cystic, congenital      Short stature     Treated with growth hormone     Unspecified hypertensive kidney disease with chronic kidney disease stage I through stage IV, or unspecified(403.90)        Past Surgical History:   Procedure Laterality Date     LAPAROSCOPIC CHOLECYSTECTOMY N/A 10/24/2014    Procedure: LAPAROSCOPIC CHOLECYSTECTOMY;  Surgeon: Daivd Gibson MD;  [facility]     NOSE SURGERY         No Known Allergies    Outpatient Medications Prior to Visit   Medication Sig Dispense Refill     adapalene (DIFFERIN) 0.1 % cream Apply topically At Bedtime Apply as directed        "cholecalciferol (VITAMIN  -D) 1000 UNITS capsule Take 1 capsule by mouth daily       citalopram (CELEXA) 40 MG tablet Take 1 tablet by mouth daily.       cyanocolbalamin (VITAMIN  B-12) 500 MCG tablet Take 500 mcg by mouth daily       guanFACINE HCl (INTUNIV) 3 MG TB24 Take 1 tablet by mouth daily.       hydrochlorothiazide (HYDRODIURIL) 25 MG tablet Take 1 tablet (25 mg) by mouth 2 times daily 180 tablet 1     lisinopril (PRINIVIL/ZESTRIL) 10 MG tablet Take 1 tablet (10 mg) by mouth daily 90 tablet 1     risperiDONE (RISPERDAL) 0.5 MG tablet Take 0.5 mg by mouth 2 times daily       ursodiol (ACTIGALL) 300 MG capsule Take 1 capsule (300 mg) by mouth 2 times daily 240 capsule 4     No facility-administered medications prior to visit.        Family History   Problem Relation Age of Onset     Hypertension Father      started in his 20s     Lipids Father      high cholesterol     Myocardial Infarction Paternal Grandfather        Social History: Has been living at home this summer with parents and 19yo sister. Cesar in college at [employer]. He is studying environmental studies. Drinks alcohol socially. No tobacco use.     Review of Systems: As above. All other systems negative per complete ROS per patient questionnaire.     Physical Exam: /68 (BP Location: Right arm, Patient Position: Sitting, Cuff Size: Adult Large)  Pulse 110  Ht 5' 10.35\" (178.7 cm)  Wt 221 lb 9 oz (100.5 kg)  BMI 31.47 kg/m2  GEN: WDWN male in no acute distress. Pleasant, interactive. Answers questions appropriately. Cooperative with exam.   HEENT: NC/AT. Pupils equal and round. No scleral icterus. No rhinorrhea. MMMs.   LYMPH: No cervical or supraclavicular LAD bilaterally.  PULM: CTAB. Breath sounds symmetric. No wheezes or crackles.  CV: RRR. Normal S1, S2. No murmurs.  ABD: Nondistended. Normoactive bowel sounds. Soft, no tenderness to palpation. No HSM or other masses appreciated.   EXT: No deformities, no clubbing. Cap refill <2sec. " Radial pulse 2+.   SKIN: No jaundice, bruising or petechiae on incomplete skin exam.    Results Reviewed:   Recent Results (from the past 672 hour(s))   Renal panel    Collection Time: 08/23/18  8:40 AM   Result Value Ref Range    Sodium 139 133 - 144 mmol/L    Potassium 4.0 3.4 - 5.3 mmol/L    Chloride 103 94 - 109 mmol/L    Carbon Dioxide 28 20 - 32 mmol/L    Anion Gap 7 3 - 14 mmol/L    Glucose 91 70 - 99 mg/dL    Urea Nitrogen 28 7 - 30 mg/dL    Creatinine 1.51 (H) 0.66 - 1.25 mg/dL    GFR Estimate 59 (L) >60 mL/min/1.7m2    GFR Estimate If Black 71 >60 mL/min/1.7m2    Calcium 9.8 8.5 - 10.1 mg/dL    Phosphorus 3.1 2.5 - 4.5 mg/dL    Albumin 4.4 3.4 - 5.0 g/dL   CBC with platelets    Collection Time: 08/23/18  8:40 AM   Result Value Ref Range    WBC 7.0 4.0 - 11.0 10e9/L    RBC Count 5.01 4.4 - 5.9 10e12/L    Hemoglobin 14.9 13.3 - 17.7 g/dL    Hematocrit 44.2 40.0 - 53.0 %    MCV 88 78 - 100 fl    MCH 29.7 26.5 - 33.0 pg    MCHC 33.7 31.5 - 36.5 g/dL    RDW 12.5 10.0 - 15.0 %    Platelet Count 251 150 - 450 10e9/L   Hemoglobin A1c    Collection Time: 08/23/18  8:40 AM   Result Value Ref Range    Hemoglobin A1C 5.9 (H) 0 - 5.6 %   Lipid panel    Collection Time: 08/23/18  8:40 AM   Result Value Ref Range    Cholesterol 232 (H) <200 mg/dL    Triglycerides 267 (H) <150 mg/dL    HDL Cholesterol 34 (L) >39 mg/dL    LDL Cholesterol Calculated 145 (H) <100 mg/dL    Non HDL Cholesterol 198 (H) <130 mg/dL   Prolactin    Collection Time: 08/23/18  8:40 AM   Result Value Ref Range    Prolactin 34 (H) 2 - 18 ug/L   Alkaline phosphatase    Collection Time: 08/23/18  8:40 AM   Result Value Ref Range    Alkaline Phosphatase 74 40 - 150 U/L   ALT    Collection Time: 08/23/18  8:40 AM   Result Value Ref Range    ALT 32 0 - 70 U/L   AST    Collection Time: 08/23/18  8:40 AM   Result Value Ref Range    AST 19 0 - 45 U/L   Bilirubin  total    Collection Time: 08/23/18  8:40 AM   Result Value Ref Range    Bilirubin Total 0.6 0.2 -  1.3 mg/dL   Bilirubin direct    Collection Time: 08/23/18  8:40 AM   Result Value Ref Range    Bilirubin Direct <0.1 0.0 - 0.2 mg/dL   Protein total    Collection Time: 08/23/18  8:40 AM   Result Value Ref Range    Protein Total 8.6 6.8 - 8.8 g/dL   Parathyroid Hormone Intact    Collection Time: 08/23/18  8:40 AM   Result Value Ref Range    Parathyroid Hormone Intact 10 (L) 18 - 80 pg/mL   Vitamin D Deficiency    Collection Time: 08/23/18  8:40 AM   Result Value Ref Range    Vitamin D Deficiency screening 42 20 - 75 ug/L   GGT    Collection Time: 08/23/18  8:40 AM   Result Value Ref Range    GGT 40 0 - 75 U/L   AFP tumor marker    Collection Time: 08/23/18  8:40 AM   Result Value Ref Range    Alpha Fetoprotein 2.1 0 - 8 ug/L      EXAMINATION: US ABDOMEN COMPLETE WITH DOPPLER, 8/23/2018 10:12 AM      COMPARISON: MR 8/4/2017     HISTORY: Caroli syndrome     Findings:  Liver: The liver demonstrates normal homogeneous echotexture. No  evidence of a focal hepatic mass.      Extrahepatic portal vein flow is antegrade, measuring 34 cm/sec.  Right portal vein flow is antegrade, measuring 32 cm/sec.  Left portal vein flow is antegrade, measuring 92 cm/sec.     Flow in the hepatic artery is towards the liver and:  62 cm/sec peak systolic  0.59 resistive index.      The splenic vein is patent and flow is towards the liver.  The left,  middle, and right hepatic veins are patent with flow towards the IVC.  The IVC is patent with flow towards the heart.   The visualized aorta  is not dilated.     Gallbladder: The gallbladder has been surgically removed.     Bile Ducts: Redemonstration of cystic dilation of the intrahepatic  bile ducts, measuring up to 6.2 x 7.0 x 7.9 cm. The common bile duct  measures 5.8 mm in diameter     Pancreas: Visualized portions of the head and body of the pancreas are  unremarkable.      Kidneys: Both kidneys are of normal echotexture, without  hydronephrosis. Multiple simple cysts in each kidney,  measuring up to  1.8 cm on the right and 1.7 cm on the left. The craniocaudal  dimensions are: right- 12.0 cm, left- 10.6 cm.     Spleen: The spleen measures 12.5 cm in sagittal dimension.     Fluid: No evidence of ascites or pleural effusions.                                                                      Impression:   1.  Hepatomegaly with ductal dilatation, compatible with Caroli's.  Doppler evaluation is normal.  2.  Stable borderline to mildly dilated extrahepatic bile duct.  3.  Polycystic kidney disease.  4.  Postoperative changes of cholecystectomy.     I have personally reviewed the examination and initial interpretation  and I agree with the findings.     CALIN MURPHY MD    Assessment: Jack is a 18 year old male with  1. ARPKD with CKD stage II and hypertension  2. 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 Normal liver enzymes  4. No signs or portal hypertension  5. Normal AFP  6. Splenic cyst - stable   7. No new symptoms or concerns    Plan:  1. Continue labs every 6 months, including CBC, liver panel, GGT, and CA 19-9 and AFP to screen for cholangiocarcinoma and hepatocellular carcinoma.   2. Encouraged Jack to limit alcohol consumption. No tobacco use or exposure given increased risk of cancer.   3. Continue ursodiol.   4. Yearly liver US with Doppler.  5. Follow up in GI clinic in 1 year or sooner as needed. Jack prefers to continue to follow with Peds GI and Nephrology through [facility].     Sincerely,  Shelby Talavera MD  Pediatric Gastroenterology  [facility]    CC  Brando Yost Michelle (Pediatric Nephrology)

## 2018-08-28 NOTE — TELEPHONE ENCOUNTER
----- Message from Taina Ni MD sent at 8/27/2018 11:45 PM CDT -----  Please let Solomon and mother know:   Creatinine overall stable at 1.51 (was 1.41 last year and 1.44 before that). Hemoglobin and other electrolytes normal. His cholesterol is further elevated. I recommend that he see his primary provider to discuss whether treatment of his high cholesterol is indicated. May need to see internal medicine or family practice rather than pediatrics.     Repeat labs in 6 months. I'll enter what I need as future orders.     Please fax separately all of the labs [facility] at [phone]. Solomon has an appointment with him on 8/28.     Taina

## 2018-08-28 NOTE — TELEPHONE ENCOUNTER
Called and spoke with Solomon's mom, gave her the results and recommendations.  Mom said that she was already looking into getting him to see his PCP for cholesterol.  She also said she would come here in January when he is back home from school for repeat labs.  She has orders for labs needed for psych at that time as well and will bring those orders with.  Mom will call closer to then to schedule the labs.      Faxed the lab results to Dr. Laird as requested to the number listed below.    Mom verbalized understanding and will call back with any questions or concerns.    Inez Collier RN Care Coordinator  [facility]

## 2018-08-28 NOTE — PATIENT INSTRUCTIONS
[facility]  [facility] [place]      Pediatric Call Center [phone], option 1  Inez Collier RN Care Coordinator:  [phone]    After Hours Emergency:  [phone].  Ask for the on-call pediatric doctor for the specialty you are calling for be paged.    Prescription Renewals:  Your pharmacy must fax requests to [phone].  Please allow 2-3 days for prescriptions to be authorized.    If your physician has ordered an CT or MRI, you may schedule this test by calling [facility] in [place] at [phone].

## 2018-08-28 NOTE — PROGRESS NOTES
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 Last seen in August 2017.     INTERVAL Hx: Solomon returns today with his mother for scheduled follow-up. Just saw Peds Nephrology last week. He had labs at that visit on 8/23, as well as an abdominal US with Doppler.     GI labs were unchanged. Transaminases and bilirubin are still WNL. AFP was 2.1 compared to 3.2 on previous check in December 2017. CBC is normal including platelet count of 251 (previous check was 257).     Ultrasound demonstrated stable dilation of the intrahepatic and extrahepatic bilie ducts.     Solomon has been feeling well. He has no concerns today. He reports no abdominal pain, nausea, fatigue, or abnormal bruising or bleeding.     He drinks socially in moderation. He does not use tobacco.       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, 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     Liver disease, cystic, congenital      Short stature     Treated with growth hormone     Unspecified hypertensive kidney disease with chronic kidney disease stage I through stage IV, or unspecified(403.90)        Past Surgical History:   Procedure Laterality Date     LAPAROSCOPIC CHOLECYSTECTOMY N/A 10/24/2014    Procedure: LAPAROSCOPIC CHOLECYSTECTOMY;  Surgeon: David Gibson MD;  [facility]     NOSE SURGERY         No Known Allergies    Outpatient Medications Prior to Visit   Medication Sig Dispense Refill     adapalene (DIFFERIN) 0.1 % cream Apply topically At Bedtime Apply as directed       cholecalciferol (VITAMIN  -D) 1000 UNITS capsule Take 1 capsule by mouth daily        "citalopram (CELEXA) 40 MG tablet Take 1 tablet by mouth daily.       cyanocolbalamin (VITAMIN  B-12) 500 MCG tablet Take 500 mcg by mouth daily       guanFACINE HCl (INTUNIV) 3 MG TB24 Take 1 tablet by mouth daily.       hydrochlorothiazide (HYDRODIURIL) 25 MG tablet Take 1 tablet (25 mg) by mouth 2 times daily 180 tablet 1     lisinopril (PRINIVIL/ZESTRIL) 10 MG tablet Take 1 tablet (10 mg) by mouth daily 90 tablet 1     risperiDONE (RISPERDAL) 0.5 MG tablet Take 0.5 mg by mouth 2 times daily       ursodiol (ACTIGALL) 300 MG capsule Take 1 capsule (300 mg) by mouth 2 times daily 240 capsule 4     No facility-administered medications prior to visit.        Family History   Problem Relation Age of Onset     Hypertension Father      started in his 20s     Lipids Father      high cholesterol     Myocardial Infarction Paternal Grandfather        Social History: Has been living at home this summer with parents and 17yo sister. Cesar in college at [employer]. He is studying environmental studies. Drinks alcohol socially. No tobacco use.     Review of Systems: As above. All other systems negative per complete ROS per patient questionnaire.     Physical Exam: /68 (BP Location: Right arm, Patient Position: Sitting, Cuff Size: Adult Large)  Pulse 110  Ht 5' 10.35\" (178.7 cm)  Wt 221 lb 9 oz (100.5 kg)  BMI 31.47 kg/m2  GEN: WDWN male in no acute distress. Pleasant, interactive. Answers questions appropriately. Cooperative with exam.   HEENT: NC/AT. Pupils equal and round. No scleral icterus. No rhinorrhea. MMMs.   LYMPH: No cervical or supraclavicular LAD bilaterally.  PULM: CTAB. Breath sounds symmetric. No wheezes or crackles.  CV: RRR. Normal S1, S2. No murmurs.  ABD: Nondistended. Normoactive bowel sounds. Soft, no tenderness to palpation. No HSM or other masses appreciated.   EXT: No deformities, no clubbing. Cap refill <2sec. Radial pulse 2+.   SKIN: No jaundice, bruising or petechiae on incomplete skin " exam.    Results Reviewed:   Recent Results (from the past 672 hour(s))   Renal panel    Collection Time: 08/23/18  8:40 AM   Result Value Ref Range    Sodium 139 133 - 144 mmol/L    Potassium 4.0 3.4 - 5.3 mmol/L    Chloride 103 94 - 109 mmol/L    Carbon Dioxide 28 20 - 32 mmol/L    Anion Gap 7 3 - 14 mmol/L    Glucose 91 70 - 99 mg/dL    Urea Nitrogen 28 7 - 30 mg/dL    Creatinine 1.51 (H) 0.66 - 1.25 mg/dL    GFR Estimate 59 (L) >60 mL/min/1.7m2    GFR Estimate If Black 71 >60 mL/min/1.7m2    Calcium 9.8 8.5 - 10.1 mg/dL    Phosphorus 3.1 2.5 - 4.5 mg/dL    Albumin 4.4 3.4 - 5.0 g/dL   CBC with platelets    Collection Time: 08/23/18  8:40 AM   Result Value Ref Range    WBC 7.0 4.0 - 11.0 10e9/L    RBC Count 5.01 4.4 - 5.9 10e12/L    Hemoglobin 14.9 13.3 - 17.7 g/dL    Hematocrit 44.2 40.0 - 53.0 %    MCV 88 78 - 100 fl    MCH 29.7 26.5 - 33.0 pg    MCHC 33.7 31.5 - 36.5 g/dL    RDW 12.5 10.0 - 15.0 %    Platelet Count 251 150 - 450 10e9/L   Hemoglobin A1c    Collection Time: 08/23/18  8:40 AM   Result Value Ref Range    Hemoglobin A1C 5.9 (H) 0 - 5.6 %   Lipid panel    Collection Time: 08/23/18  8:40 AM   Result Value Ref Range    Cholesterol 232 (H) <200 mg/dL    Triglycerides 267 (H) <150 mg/dL    HDL Cholesterol 34 (L) >39 mg/dL    LDL Cholesterol Calculated 145 (H) <100 mg/dL    Non HDL Cholesterol 198 (H) <130 mg/dL   Prolactin    Collection Time: 08/23/18  8:40 AM   Result Value Ref Range    Prolactin 34 (H) 2 - 18 ug/L   Alkaline phosphatase    Collection Time: 08/23/18  8:40 AM   Result Value Ref Range    Alkaline Phosphatase 74 40 - 150 U/L   ALT    Collection Time: 08/23/18  8:40 AM   Result Value Ref Range    ALT 32 0 - 70 U/L   AST    Collection Time: 08/23/18  8:40 AM   Result Value Ref Range    AST 19 0 - 45 U/L   Bilirubin  total    Collection Time: 08/23/18  8:40 AM   Result Value Ref Range    Bilirubin Total 0.6 0.2 - 1.3 mg/dL   Bilirubin direct    Collection Time: 08/23/18  8:40 AM   Result  Value Ref Range    Bilirubin Direct <0.1 0.0 - 0.2 mg/dL   Protein total    Collection Time: 08/23/18  8:40 AM   Result Value Ref Range    Protein Total 8.6 6.8 - 8.8 g/dL   Parathyroid Hormone Intact    Collection Time: 08/23/18  8:40 AM   Result Value Ref Range    Parathyroid Hormone Intact 10 (L) 18 - 80 pg/mL   Vitamin D Deficiency    Collection Time: 08/23/18  8:40 AM   Result Value Ref Range    Vitamin D Deficiency screening 42 20 - 75 ug/L   GGT    Collection Time: 08/23/18  8:40 AM   Result Value Ref Range    GGT 40 0 - 75 U/L   AFP tumor marker    Collection Time: 08/23/18  8:40 AM   Result Value Ref Range    Alpha Fetoprotein 2.1 0 - 8 ug/L      EXAMINATION: US ABDOMEN COMPLETE WITH DOPPLER, 8/23/2018 10:12 AM      COMPARISON: MR 8/4/2017     HISTORY: Caroli syndrome     Findings:  Liver: The liver demonstrates normal homogeneous echotexture. No  evidence of a focal hepatic mass.      Extrahepatic portal vein flow is antegrade, measuring 34 cm/sec.  Right portal vein flow is antegrade, measuring 32 cm/sec.  Left portal vein flow is antegrade, measuring 92 cm/sec.     Flow in the hepatic artery is towards the liver and:  62 cm/sec peak systolic  0.59 resistive index.      The splenic vein is patent and flow is towards the liver.  The left,  middle, and right hepatic veins are patent with flow towards the IVC.  The IVC is patent with flow towards the heart.   The visualized aorta  is not dilated.     Gallbladder: The gallbladder has been surgically removed.     Bile Ducts: Redemonstration of cystic dilation of the intrahepatic  bile ducts, measuring up to 6.2 x 7.0 x 7.9 cm. The common bile duct  measures 5.8 mm in diameter     Pancreas: Visualized portions of the head and body of the pancreas are  unremarkable.      Kidneys: Both kidneys are of normal echotexture, without  hydronephrosis. Multiple simple cysts in each kidney, measuring up to  1.8 cm on the right and 1.7 cm on the left. The  craniocaudal  dimensions are: right- 12.0 cm, left- 10.6 cm.     Spleen: The spleen measures 12.5 cm in sagittal dimension.     Fluid: No evidence of ascites or pleural effusions.                                                                      Impression:   1.  Hepatomegaly with ductal dilatation, compatible with Caroli's.  Doppler evaluation is normal.  2.  Stable borderline to mildly dilated extrahepatic bile duct.  3.  Polycystic kidney disease.  4.  Postoperative changes of cholecystectomy.     I have personally reviewed the examination and initial interpretation  and I agree with the findings.     CALIN MURPHY MD      Assessment: Jack is a 18 year old male with  1. ARPKD with CKD stage II and hypertension  2. 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 Normal liver enzymes  4. No signs or portal hypertension  5. Normal AFP  6. Splenic cyst - stable   7. No new symptoms or concerns    Plan:  1. Continue labs every 6 months, including CBC, liver panel, GGT, and CA 19-9 and AFP to screen for cholangiocarcinoma and hepatocellular carcinoma.   2. Encouraged Jack to limit alcohol consumption. No tobacco use or exposure given increased risk of cancer.   3. Continue ursodiol.   4. Yearly liver US with Doppler.  5. Follow up in GI clinic in 1 year or sooner as needed. Jack prefers to continue to follow with Peds GI and Nephrology through [facility].     Sincerely,    Shelby Talavera MD  Pediatric Gastroenterology  [facility]      Brando Rizo Michelle (Pediatric Nephrology)

## 2018-11-07 DIAGNOSIS — Q61.3 POLYCYSTIC KIDNEY DISEASE: ICD-10-CM

## 2018-11-07 RX ORDER — URSODIOL 300 MG/1
300 CAPSULE ORAL 2 TIMES DAILY
Qty: 180 CAPSULE | Refills: 3 | Status: SHIPPED | OUTPATIENT
Start: 2018-11-07 | End: 2020-08-03

## 2019-01-03 ENCOUNTER — TELEPHONE (OUTPATIENT)
Dept: GASTROENTEROLOGY | Facility: CLINIC | Age: 19
End: 2019-01-03

## 2019-01-03 NOTE — TELEPHONE ENCOUNTER
Mom had called and left a message on the nurse triage line to schedule a lab visit for the labs that Dr. Talavera ordered to be done this month.    Called mom back and left her a message on a self identified voicemail.  We can go ahead and schedule Solomon to come in on 2/1 as she requested. Preferably between 9-9:30 due to only having a  here for the first half of the day that day.  It was also noted that Solomon may be bringing lab orders from an outside provider as well, so let mom know to call the nurse triage line back with that information so we can make sure that we have the correct tools to draw those in our clinic.    Left the scheduling number for the labs as well as the nurse triage line for f/u.    Inez Collier, RN Care Coordinator  [facility]

## 2019-01-03 NOTE — TELEPHONE ENCOUNTER
Mom called back.  She was not sure what the labs ordered from the other provider were, but said that they routinely have them drawn at our clinic.  [facility] and confirmed we would be able to draw all the needed labs.  An appt was made for his labs to be drawn, fasting, here on 2/1 at 1000.    Mom verbalized understanding and will call back with any questions or concerns.    Inez Collier RN Care Coordinator  [facility]

## 2019-02-01 DIAGNOSIS — N18.2 CHRONIC KIDNEY DISEASE, STAGE 2 (MILD): ICD-10-CM

## 2019-02-01 DIAGNOSIS — Q44.5 CAROLI DISEASE: ICD-10-CM

## 2019-02-01 DIAGNOSIS — Q61.19 AUTOSOMAL RECESSIVE POLYCYSTIC KIDNEY DISEASE AND CONGENITAL HEPATIC FIBROSIS (ARPKD/CHF): ICD-10-CM

## 2019-02-01 DIAGNOSIS — N18.2 CKD (CHRONIC KIDNEY DISEASE), STAGE II: Primary | ICD-10-CM

## 2019-02-01 LAB
ALBUMIN SERPL-MCNC: 4.1 G/DL (ref 3.4–5)
ALP SERPL-CCNC: 88 U/L (ref 40–150)
ALT SERPL W P-5'-P-CCNC: 93 U/L (ref 0–70)
ANION GAP SERPL CALCULATED.3IONS-SCNC: 8 MMOL/L (ref 3–14)
AST SERPL W P-5'-P-CCNC: 35 U/L (ref 0–45)
BASOPHILS # BLD AUTO: 0 10E9/L (ref 0–0.2)
BASOPHILS NFR BLD AUTO: 0.4 %
BILIRUB DIRECT SERPL-MCNC: 0.1 MG/DL (ref 0–0.2)
BILIRUB SERPL-MCNC: 0.7 MG/DL (ref 0.2–1.3)
BUN SERPL-MCNC: 16 MG/DL (ref 7–30)
CALCIUM SERPL-MCNC: 9.5 MG/DL (ref 8.5–10.1)
CHLORIDE SERPL-SCNC: 104 MMOL/L (ref 94–109)
CO2 SERPL-SCNC: 28 MMOL/L (ref 20–32)
CREAT SERPL-MCNC: 1.46 MG/DL (ref 0.66–1.25)
DEPRECATED CALCIDIOL+CALCIFEROL SERPL-MC: 20 UG/L (ref 20–75)
DIFFERENTIAL METHOD BLD: NORMAL
EOSINOPHIL # BLD AUTO: 0.3 10E9/L (ref 0–0.7)
EOSINOPHIL NFR BLD AUTO: 4.1 %
ERYTHROCYTE [DISTWIDTH] IN BLOOD BY AUTOMATED COUNT: 13.2 % (ref 10–15)
FERRITIN SERPL-MCNC: 47 NG/ML (ref 26–388)
GFR SERPL CREATININE-BSD FRML MDRD: 68 ML/MIN/{1.73_M2}
GGT SERPL-CCNC: 71 U/L (ref 0–75)
GLUCOSE SERPL-MCNC: 99 MG/DL (ref 70–99)
HBA1C MFR BLD: 5.1 % (ref 0–5.6)
HCT VFR BLD AUTO: 48.7 % (ref 40–53)
HGB BLD-MCNC: 16.2 G/DL (ref 13.3–17.7)
IMM GRANULOCYTES # BLD: 0 10E9/L (ref 0–0.4)
IMM GRANULOCYTES NFR BLD: 0.4 %
LYMPHOCYTES # BLD AUTO: 2.5 10E9/L (ref 0.8–5.3)
LYMPHOCYTES NFR BLD AUTO: 33.8 %
MCH RBC QN AUTO: 30 PG (ref 26.5–33)
MCHC RBC AUTO-ENTMCNC: 33.3 G/DL (ref 31.5–36.5)
MCV RBC AUTO: 90 FL (ref 78–100)
MONOCYTES # BLD AUTO: 0.5 10E9/L (ref 0–1.3)
MONOCYTES NFR BLD AUTO: 6.2 %
NEUTROPHILS # BLD AUTO: 4.1 10E9/L (ref 1.6–8.3)
NEUTROPHILS NFR BLD AUTO: 55.1 %
NRBC # BLD AUTO: 0 10*3/UL
NRBC BLD AUTO-RTO: 0 /100
PHOSPHATE SERPL-MCNC: 3.1 MG/DL (ref 2.5–4.5)
PLATELET # BLD AUTO: 271 10E9/L (ref 150–450)
POTASSIUM SERPL-SCNC: 4.3 MMOL/L (ref 3.4–5.3)
PROLACTIN SERPL-MCNC: 21 UG/L (ref 2–18)
PROT SERPL-MCNC: 8.3 G/DL (ref 6.8–8.8)
PTH-INTACT SERPL-MCNC: 35 PG/ML (ref 18–80)
RBC # BLD AUTO: 5.4 10E12/L (ref 4.4–5.9)
SODIUM SERPL-SCNC: 139 MMOL/L (ref 133–144)
T4 FREE SERPL-MCNC: 0.96 NG/DL (ref 0.76–1.46)
TSH SERPL DL<=0.005 MIU/L-ACNC: 2.48 MU/L (ref 0.4–4)
WBC # BLD AUTO: 7.4 10E9/L (ref 4–11)

## 2019-02-04 ENCOUNTER — TELEPHONE (OUTPATIENT)
Dept: NEPHROLOGY | Facility: CLINIC | Age: 19
End: 2019-02-04

## 2019-02-04 LAB
CHOLEST SERPL-MCNC: 236 MG/DL
HDLC SERPL-MCNC: 37 MG/DL
LDLC SERPL CALC-MCNC: 138 MG/DL
NONHDLC SERPL-MCNC: 198 MG/DL
TRIGL SERPL-MCNC: 301 MG/DL

## 2019-02-04 NOTE — TELEPHONE ENCOUNTER
Faxed all ordered lab results as Solomon requested to [facility] at [phone].  Scanned orders into Epic.    Orders were for:  ALT, CBC w/diff and plat, ferritin, HbA1c, lipid profile, prolactin, T4 free, TSH, and vitamin d.    Inez Collier, RN Care Coordinator  [facility]

## 2019-02-15 ENCOUNTER — TELEPHONE (OUTPATIENT)
Dept: GASTROENTEROLOGY | Facility: CLINIC | Age: 19
End: 2019-02-15

## 2019-02-15 NOTE — TELEPHONE ENCOUNTER
----- Message from Shelby Talavera MD sent at 2/11/2019  1:03 PM CST -----  Inez     Solomon's ALT was mildly elevated, which is a change for him.     I would like him to have repeat liver panel, GGT, AFP and CA 19-9 checked.     Thanks,  Shelby

## 2019-02-19 DIAGNOSIS — I15.8 OTHER SECONDARY HYPERTENSION: ICD-10-CM

## 2019-02-19 RX ORDER — LISINOPRIL 10 MG/1
10 TABLET ORAL DAILY
Qty: 90 TABLET | Refills: 3 | Status: SHIPPED | OUTPATIENT
Start: 2019-02-19 | End: 2020-05-04

## 2019-02-19 NOTE — TELEPHONE ENCOUNTER
Let mom know that Dr. Talavera would like repeat labs. Mom called and left a message on the nurse triage line that Solomon is at school and she will not be able to bring him to our clinic until May.      Called and left mom a message on a self identified voicemail.  Let her know that Dr. Talavera would like them repeated within a month.  We can send the orders to a facility near his school to be drawn if that works better for him.  Asked her to call the nurse triage line back to coordinate.    Inez Collier RN Care Coordinator  [facility]

## 2019-02-20 DIAGNOSIS — I15.8 OTHER SECONDARY HYPERTENSION: ICD-10-CM

## 2019-02-20 NOTE — TELEPHONE ENCOUNTER
Mom called back.  Per mom, there is no way he can get the labs done.  Solomon does not have a drivers license so cannot get to a clinic, even near his school. Mom also does not believe he will go on his own.  Per mom, Solomon is going through a lot of mental health issues right now and does not see him cooperating to go on his own.      After talking with Dr. Talavera, let Solomon's mom know that it would be ok for him to have the repeat labs done when she picks him up from school in May.  The orders are in.  She can call and schedule that appointment closer to.    Mom verbalized understanding and will call back with any questions or concerns.    Inez Collier RN Care Coordinator  [facility]

## 2019-02-21 RX ORDER — HYDROCHLOROTHIAZIDE 25 MG/1
25 TABLET ORAL 2 TIMES DAILY
Qty: 180 TABLET | Refills: 1 | Status: SHIPPED | OUTPATIENT
Start: 2019-02-21 | End: 2020-08-03

## 2019-05-21 ENCOUNTER — TELEPHONE (OUTPATIENT)
Dept: GASTROENTEROLOGY | Facility: CLINIC | Age: 19
End: 2019-05-21

## 2019-05-21 NOTE — TELEPHONE ENCOUNTER
Mom called to get labs scheduled for f/u as discussed with Dr. Talavera in Feb.  Solomon is only available this Friday, when we do not have a  in clinic.  Mom would like to wait until July to get labs if possible when they see nephrology.    Reached out to Dr. Talavera, the ordering provider.  She would like to not wait another three months to f/u on abnormal labs from Feb.  She would prefer he get them done on Friday at his PCP.    Called mom and left her a message with that information on her self identified voicemail.  Faxed the orders to [facility] in [place].    Inez Collier, RN Care Coordinator  [facility]

## 2019-05-24 ENCOUNTER — TRANSFERRED RECORDS (OUTPATIENT)
Dept: HEALTH INFORMATION MANAGEMENT | Facility: CLINIC | Age: 19
End: 2019-05-24

## 2019-07-23 NOTE — PROGRESS NOTES
Return Visit for ARPKD, CKD stage II, hypertension    Chief Complaint:  Chief Complaint   Patient presents with     Kidney Problem     CKD 2       HPI:    I had the pleasure of seeing Solomon Wilcox in the Pediatric [facility]phrology [facility] today for follow-up of ARPKD, CKD stage II, hypertension. Solomon is a 19 year old male accompanied by his mother.  Solomon Wilcox was last seen in the renal clinic on 8/23/18. Since then he has been doing well with no hospitalizations and no surgeries. He is not having abdominal or flank pain, headaches, gross hematuria. His energy is good. Weight has increased 5kg over the past year. He is taking his medications, although wasn't able to name his blood pressure medications. His mother is concerned that he is missing them more often. He is scheduled to see Dr. Laird this week and mom requested that labs be completed for this visit. There continues to be a significant amount of parent-child conflict.     Review of Systems:  A comprehensive review of systems was performed and found to be negative other than noted in the HPI.    Allergies:  Solomon is allergic to no clinical screening - see comments..    Active Medications:  Current Outpatient Medications   Medication Sig Dispense Refill     cholecalciferol (VITAMIN  -D) 1000 UNITS capsule Take 1 capsule by mouth daily       citalopram (CELEXA) 40 MG tablet Take 1 tablet by mouth daily.       cyanocolbalamin (VITAMIN  B-12) 500 MCG tablet Take 500 mcg by mouth daily       guanFACINE HCl (INTUNIV) 3 MG TB24 Take 1 tablet by mouth daily.       hydrochlorothiazide (HYDRODIURIL) 25 MG tablet Take 1 tablet (25 mg) by mouth 2 times daily 180 tablet 1     lisinopril (PRINIVIL/ZESTRIL) 10 MG tablet Take 1 tablet (10 mg) by mouth daily 90 tablet 3     risperiDONE (RISPERDAL) 0.5 MG tablet Take 0.5 mg by mouth 2 times daily       ursodiol (ACTIGALL) 300 MG capsule Take 1 capsule (300 mg) by mouth 2 times daily 180 capsule 3     adapalene (DIFFERIN) 0.1 %  cream Apply topically At Bedtime Apply as directed          Immunizations:  Immunization History   Administered Date(s) Administered     Comvax (HIB/HepB) 1998, 1998, 1998     DTAP (<7y) 1998, 1998, 1998, 09/02/1999, 06/13/2003     FLU 6-35 months 09/26/2011, 10/23/2012     Flu, Unspecified 10/01/2018     M3j4-09 Novel Flu 11/15/2009     HPV Quadrivalent 04/30/2014, 06/09/2014, 10/30/2014     HepA-ped 2 Dose 12/19/2007, 06/23/2008     Influenza (IIV3) PF 10/28/2003, 10/19/2004, 10/18/2005, 11/04/2006, 10/30/2007, 11/08/2008, 10/17/2009, 10/30/2010, 09/26/2011, 11/09/2013     Influenza Vaccine IM 3yrs+ 4 Valent IIV4 09/27/2014, 09/26/2015, 09/27/2016     Influenza Vaccine Im 4yrs+ 4 Valent CCIIV4 10/10/2018     MMR 05/07/1999, 06/13/2003     Meningococcal (Menactra ) 02/26/2010     Meningococcal (Menveo ) 04/30/2014     Pedvax-hib 09/02/1999     Poliovirus, inactivated (IPV) 1998, 1998, 09/02/1999, 06/13/2003     TD (ADULT, 7+) 04/27/2009     TDAP Vaccine (Adacel) 04/27/2009     Varicella 05/07/1999, 02/26/2010        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     Liver disease, cystic, congenital      Short stature     Treated with growth hormone     Unspecified hypertensive kidney disease with chronic kidney disease stage I through stage IV, or unspecified(403.90)          PSHx:    Past Surgical History:   Procedure Laterality Date     LAPAROSCOPIC CHOLECYSTECTOMY N/A 10/24/2014    Procedure: LAPAROSCOPIC CHOLECYSTECTOMY;  Surgeon: David Gibson MD;  Location: [facility] OR     NOSE SURGERY         FHx:  Family History   Problem Relation Age of Onset     Hypertension Father         started in his 20s     Lipids Father         high cholesterol      "Myocardial Infarction Paternal Grandfather        SHx:  Social History     Tobacco Use     Smoking status: Never Smoker     Smokeless tobacco: Never Used   Substance Use Topics     Alcohol use: No     Drug use: No     Social History     Social History Narrative    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  He lives at home with his parents and younger sister in the Summer. He will be a senior at [employer] next Fall.        Physical Exam:    /71 (BP Location: Right arm, Patient Position: Sitting, Cuff Size: Adult Large)   Pulse 89   Ht 1.795 m (5' 10.67\")   Wt 106.1 kg (233 lb 14.5 oz)   BMI 32.93 kg/m     Exam:  Constitutional: healthy, alert and no distress  Head: Normocephalic. No masses, lesions, or abnormalities  Neck: Neck supple. No adenopathy. Thyroid symmetric, normal size   EYE: BETTY, EOMI,  no periorbital edema  ENT: ENT exam normal, no neck nodes   Cardiovascular: negative,  RRR. No murmurs, clicks gallops or rub  Respiratory: negative,  Lungs clear  Gastrointestinal: Abdomen soft, non-tender. BS normal. No masses, organomegaly, kidneys not palpable  : Deferred  Musculoskeletal: extremities normal- no gross deformities noted, gait normal    Skin: no suspicious lesions or rashes  Neurologic: Gait normal.   Psychiatric: mentation appears normal and affect normal   Hematologic/Lymphatic/Immunologic: normal ant/post cervical, axillary, supraclavicular nodes    Labs and Imaging:  Results for orders placed or performed in visit on 07/25/19   Renal panel   Result Value Ref Range    Sodium 138 133 - 144 mmol/L    Potassium 4.2 3.4 - 5.3 mmol/L    Chloride 105 94 - 109 mmol/L    Carbon Dioxide 29 20 - 32 mmol/L    Anion Gap 4 3 - 14 mmol/L    Glucose 89 70 - 99 mg/dL    Urea Nitrogen 28 7 - 30 mg/dL    Creatinine 1.47 (H) 0.66 - 1.25 mg/dL    GFR Estimate 67 >60 mL/min/[1.73_m2]    GFR Estimate If Black 78 >60 mL/min/[1.73_m2]    Calcium 9.5 8.5 - 10.1 mg/dL    Phosphorus " 3.6 2.5 - 4.5 mg/dL    Albumin 3.9 3.4 - 5.0 g/dL   Hemoglobin A1c   Result Value Ref Range    Hemoglobin A1C 5.4 0 - 5.6 %   Lipid Profile   Result Value Ref Range    Cholesterol 212 (H) <200 mg/dL    Triglycerides 240 (H) <150 mg/dL    HDL Cholesterol 35 (L) >39 mg/dL    LDL Cholesterol Calculated 129 (H) <100 mg/dL    Non HDL Cholesterol 177 (H) <130 mg/dL   Ferritin   Result Value Ref Range    Ferritin 82 26 - 388 ng/mL   GGT   Result Value Ref Range    GGT 84 (H) 0 - 75 U/L   Prolactin   Result Value Ref Range    Prolactin 27 (H) 2 - 18 ug/L   T4 free   Result Value Ref Range    T4 Free 0.87 0.76 - 1.46 ng/dL   TSH   Result Value Ref Range    TSH 1.30 0.40 - 4.00 mU/L   CBC with platelets differential   Result Value Ref Range    WBC 4.7 4.0 - 11.0 10e9/L    RBC Count 5.32 4.4 - 5.9 10e12/L    Hemoglobin 15.1 13.3 - 17.7 g/dL    Hematocrit 48.0 40.0 - 53.0 %    MCV 90 78 - 100 fl    MCH 28.4 26.5 - 33.0 pg    MCHC 31.5 31.5 - 36.5 g/dL    RDW 13.2 10.0 - 15.0 %    Platelet Count 261 150 - 450 10e9/L    Diff Method Automated Method     % Neutrophils 50.4 %    % Lymphocytes 30.2 %    % Monocytes 11.8 %    % Eosinophils 7.0 %    % Basophils 0.4 %    % Immature Granulocytes 0.2 %    Nucleated RBCs 0 0 /100    Absolute Neutrophil 2.4 1.6 - 8.3 10e9/L    Absolute Lymphocytes 1.4 0.8 - 5.3 10e9/L    Absolute Monocytes 0.6 0.0 - 1.3 10e9/L    Absolute Eosinophils 0.3 0.0 - 0.7 10e9/L    Absolute Basophils 0.0 0.0 - 0.2 10e9/L    Abs Immature Granulocytes 0.0 0 - 0.4 10e9/L    Absolute Nucleated RBC 0.0    Parathyroid Hormone Intact   Result Value Ref Range    Parathyroid Hormone Intact 7 (L) 18 - 80 pg/mL   Routine UA with microscopic   Result Value Ref Range    Color Urine Light Yellow     Appearance Urine Clear     Glucose Urine Negative NEG^Negative mg/dL    Bilirubin Urine Negative NEG^Negative    Ketones Urine Negative NEG^Negative mg/dL    Specific Gravity Urine 1.010 1.003 - 1.035    Blood Urine Negative  NEG^Negative    pH Urine 6.5 5.0 - 7.0 pH    Protein Albumin Urine Negative NEG^Negative mg/dL    Urobilinogen mg/dL Normal 0.0 - 2.0 mg/dL    Nitrite Urine Negative NEG^Negative    Leukocyte Esterase Urine Negative NEG^Negative    Source Unspecified Urine     WBC Urine 1 0 - 5 /HPF    RBC Urine <1 0 - 2 /HPF   Alkaline phosphatase   Result Value Ref Range    Alkaline Phosphatase 78 40 - 150 U/L   ALT   Result Value Ref Range    ALT 63 0 - 70 U/L   AST   Result Value Ref Range    AST 26 0 - 45 U/L   Bilirubin  total   Result Value Ref Range    Bilirubin Total 0.3 0.2 - 1.3 mg/dL   Bilirubin direct   Result Value Ref Range    Bilirubin Direct <0.1 0.0 - 0.2 mg/dL   Protein total   Result Value Ref Range    Protein Total 8.2 6.8 - 8.8 g/dL     EXAMINATION: US ABDOMEN COMPLETE WITH DOPPLER COMPLETE  7/25/2019 9:05  AM       CLINICAL HISTORY: ARPKC and Caroli - please perform US with Doppler  and compare to prior; Caroli disease     COMPARISON: Abdominal ultrasound 8/23/2010         TECHNIQUE: The abdomen was scanned in standard fashion with  specialized ultrasound transducer(s) using both gray-scale, color  Doppler, and spectral flow techniques.     FINDINGS:  Abdominal ultrasound exam with color and spectral Doppler. The liver  measures 20.0 cm (previously 20.0 cm) and is normal in contour and  echogenicity. Cystic ductal dilatation redemonstrated as on previous  exams measuring 7.9 x 6.2 x 5.7 cm and 6.5 x 3.6 x 3.7 cm. Stable from  prior. Mildly dilated extrahepatic bile duct measuring 5 mm. There is  no intrahepatic or extrahepatic biliary ductal dilatation. The common  bile duct measures 2.8 mm. The gallbladder has been surgically  removed.     Extrahepatic portal vein flow is antegrade, measuring 21.4 cm/sec.  Right portal vein flow is antegrade, measuring 23.8 cm/sec.  Left portal vein flow is antegrade, measuring 7.9 cm/sec.     Flow in the hepatic artery is towards the liver and:  103 cm/s peak systolic  0.50  resistive index.      The splenic vein is patent and flow is towards the liver.  The left,  middle, and right hepatic veins are patent with flow towards the IVC.  The IVC is patent with flow towards the heart.   The visualized aorta  is not dilated.     The spleen measures maximally 11.9 cm (12.5 cm) and is normal in  appearance. The visualized portions of the pancreas are normal in  echogenicity.     The visualized upper abdominal aorta and inferior vena cava are  normal.       The kidneys are normal in position with increased echogenicity and  through transmission compared with prior. The right kidney measures  12.9 cm (previously 12.0 cm) and the left kidney measures 11.8 cm  (previously 10.6 cm). There is no significant urinary tract dilation.  The urinary bladder is moderately distended and normal in morphology.  The bladder wall is normal.                                                                      IMPRESSION:   1. Intrahepatic ductal dilatation, stable from prior exam. Doppler  evaluation as normal.  2. Polycystic kidney disease.    I personally reviewed results of laboratory evaluation, imaging studies and past medical records that were available during this outpatient visit.      Assessment and Plan:    Solomon is a 19 year old young man with ARPKD who is doing well medically.     1. CKD stage II due to ARPKD: Estimated GFR is 67 ml/min/1.73m2 by CKD-EPI equation and similar to a year ago. Creatinine has been very stable over the past few years. Recheck labs with adult nephrology visit in 6 months. Solomon and his mother were provided names of suggested providers in adult nephrology at the [facility]. He has no evidence for anemia or hyperparathyroidism related to his CKD. He was encourage to learn the names of his medications and what they do. We reviewed his blood pressure medications today.      2. Hypertension with CKD stage II: Blood pressures are under good control on lisinopril and HCTZ. Goal blood  "pressures are <120/80. He should have blood pressures checked at each clinic visit. He does not have evidence for hyperkalemia as a side effect of his lisinopril. He is not having dizziness or lightheadedness. He should stay well hydrated as he is at risk of acute kidney injury if dehydrated while taking lisinopril. He previously had hyperkalemia with lisinopril that resolved after starting the thizide.     3. Liver cysts: Followed by Dr. Talavera in GI clinic.  Continue urodiol per GI. Follow up appointment scheduled. Also planning hepatology transition.     4. Splenic cysts: Seen on MRI    5. Elevated cholesterol: Recommend follow up with internal medicine or family practice provider to establish primary care and to determine if lipid lowering therapy would be beneficial.  Overall improving.      Medication monitoring labs were obtained for Dr. Laird and these were faxed to his clinic.     Patient Education: During this visit I discussed in detail the patient s symptoms, physical exam and evaluation results findings, tentative diagnosis as well as the treatment plan (Including but not limited to possible side effects and complications related to the disease, treatment modalities and intervention(s). Family expressed understanding and consent. Family was receptive and ready to learn; no apparent learning barriers were identified.    Follow up: No follow-ups on file. Please return sooner should Solomon become symptomatic.      Sincerely,    Taina Ni MD   Pediatric Nephrology    CC:   Patient Care Team:  Marvin Yost as PCP - General  Marvin Yost as Referring Physician (Internal Medicine)  Taina Ni MD as MD (Pediatric Nephrology)  Abhinav Laird MD as MD (Psychiatry)  MARVIN YOST    Copy to patient  MORGAN CARLOS Daniel \"Adarsh\"  [address] [facility] [id number]    "

## 2019-07-25 ENCOUNTER — ANCILLARY PROCEDURE (OUTPATIENT)
Dept: ULTRASOUND IMAGING | Facility: CLINIC | Age: 19
End: 2019-07-25
Payer: COMMERCIAL

## 2019-07-25 ENCOUNTER — OFFICE VISIT (OUTPATIENT)
Dept: NEPHROLOGY | Facility: CLINIC | Age: 19
End: 2019-07-25
Payer: COMMERCIAL

## 2019-07-25 VITALS
WEIGHT: 233.91 LBS | BODY MASS INDEX: 32.75 KG/M2 | HEIGHT: 71 IN | DIASTOLIC BLOOD PRESSURE: 71 MMHG | HEART RATE: 89 BPM | SYSTOLIC BLOOD PRESSURE: 114 MMHG

## 2019-07-25 DIAGNOSIS — Q61.19 CONGENITAL POLYCYSTIC KIDNEY, AUTOSOMAL RECESSIVE: ICD-10-CM

## 2019-07-25 DIAGNOSIS — Z51.81 ENCOUNTER FOR THERAPEUTIC DRUG MONITORING: ICD-10-CM

## 2019-07-25 DIAGNOSIS — N18.2 CKD (CHRONIC KIDNEY DISEASE), STAGE II: Primary | ICD-10-CM

## 2019-07-25 DIAGNOSIS — Q44.5 CAROLI DISEASE: ICD-10-CM

## 2019-07-25 LAB
AFP SERPL-MCNC: <1.5 UG/L (ref 0–8)
ALBUMIN SERPL-MCNC: 3.9 G/DL (ref 3.4–5)
ALBUMIN UR-MCNC: NEGATIVE MG/DL
ALP SERPL-CCNC: 78 U/L (ref 40–150)
ALT SERPL W P-5'-P-CCNC: 63 U/L (ref 0–70)
ANION GAP SERPL CALCULATED.3IONS-SCNC: 4 MMOL/L (ref 3–14)
APPEARANCE UR: CLEAR
AST SERPL W P-5'-P-CCNC: 26 U/L (ref 0–45)
BASOPHILS # BLD AUTO: 0 10E9/L (ref 0–0.2)
BASOPHILS NFR BLD AUTO: 0.4 %
BILIRUB DIRECT SERPL-MCNC: <0.1 MG/DL (ref 0–0.2)
BILIRUB SERPL-MCNC: 0.3 MG/DL (ref 0.2–1.3)
BILIRUB UR QL STRIP: NEGATIVE
BUN SERPL-MCNC: 28 MG/DL (ref 7–30)
CALCIUM SERPL-MCNC: 9.5 MG/DL (ref 8.5–10.1)
CHLORIDE SERPL-SCNC: 105 MMOL/L (ref 94–109)
CHOLEST SERPL-MCNC: 212 MG/DL
CO2 SERPL-SCNC: 29 MMOL/L (ref 20–32)
COLOR UR AUTO: NORMAL
CREAT SERPL-MCNC: 1.47 MG/DL (ref 0.66–1.25)
DIFFERENTIAL METHOD BLD: NORMAL
EOSINOPHIL # BLD AUTO: 0.3 10E9/L (ref 0–0.7)
EOSINOPHIL NFR BLD AUTO: 7 %
ERYTHROCYTE [DISTWIDTH] IN BLOOD BY AUTOMATED COUNT: 13.2 % (ref 10–15)
FERRITIN SERPL-MCNC: 82 NG/ML (ref 26–388)
GFR SERPL CREATININE-BSD FRML MDRD: 67 ML/MIN/{1.73_M2}
GGT SERPL-CCNC: 84 U/L (ref 0–75)
GLUCOSE SERPL-MCNC: 89 MG/DL (ref 70–99)
GLUCOSE UR STRIP-MCNC: NEGATIVE MG/DL
HBA1C MFR BLD: 5.4 % (ref 0–5.6)
HCT VFR BLD AUTO: 48 % (ref 40–53)
HDLC SERPL-MCNC: 35 MG/DL
HGB BLD-MCNC: 15.1 G/DL (ref 13.3–17.7)
HGB UR QL STRIP: NEGATIVE
IMM GRANULOCYTES # BLD: 0 10E9/L (ref 0–0.4)
IMM GRANULOCYTES NFR BLD: 0.2 %
KETONES UR STRIP-MCNC: NEGATIVE MG/DL
LDLC SERPL CALC-MCNC: 129 MG/DL
LEUKOCYTE ESTERASE UR QL STRIP: NEGATIVE
LYMPHOCYTES # BLD AUTO: 1.4 10E9/L (ref 0.8–5.3)
LYMPHOCYTES NFR BLD AUTO: 30.2 %
MCH RBC QN AUTO: 28.4 PG (ref 26.5–33)
MCHC RBC AUTO-ENTMCNC: 31.5 G/DL (ref 31.5–36.5)
MCV RBC AUTO: 90 FL (ref 78–100)
MONOCYTES # BLD AUTO: 0.6 10E9/L (ref 0–1.3)
MONOCYTES NFR BLD AUTO: 11.8 %
NEUTROPHILS # BLD AUTO: 2.4 10E9/L (ref 1.6–8.3)
NEUTROPHILS NFR BLD AUTO: 50.4 %
NITRATE UR QL: NEGATIVE
NONHDLC SERPL-MCNC: 177 MG/DL
NRBC # BLD AUTO: 0 10*3/UL
NRBC BLD AUTO-RTO: 0 /100
PH UR STRIP: 6.5 PH (ref 5–7)
PHOSPHATE SERPL-MCNC: 3.6 MG/DL (ref 2.5–4.5)
PLATELET # BLD AUTO: 261 10E9/L (ref 150–450)
POTASSIUM SERPL-SCNC: 4.2 MMOL/L (ref 3.4–5.3)
PROLACTIN SERPL-MCNC: 27 UG/L (ref 2–18)
PROT SERPL-MCNC: 8.2 G/DL (ref 6.8–8.8)
PTH-INTACT SERPL-MCNC: 7 PG/ML (ref 18–80)
RBC # BLD AUTO: 5.32 10E12/L (ref 4.4–5.9)
RBC #/AREA URNS AUTO: <1 /HPF (ref 0–2)
SODIUM SERPL-SCNC: 138 MMOL/L (ref 133–144)
SOURCE: NORMAL
SP GR UR STRIP: 1.01 (ref 1–1.03)
T4 FREE SERPL-MCNC: 0.87 NG/DL (ref 0.76–1.46)
TRIGL SERPL-MCNC: 240 MG/DL
TSH SERPL DL<=0.005 MIU/L-ACNC: 1.3 MU/L (ref 0.4–4)
UROBILINOGEN UR STRIP-MCNC: NORMAL MG/DL (ref 0–2)
WBC # BLD AUTO: 4.7 10E9/L (ref 4–11)
WBC #/AREA URNS AUTO: 1 /HPF (ref 0–5)

## 2019-07-25 ASSESSMENT — PAIN SCALES - GENERAL: PAINLEVEL: NO PAIN (0)

## 2019-07-25 ASSESSMENT — MIFFLIN-ST. JEOR: SCORE: 2082.87

## 2019-07-25 NOTE — LETTER
7/25/2019      RE: Solomon Wilcox  [address] [facility] [address]       Return Visit for ARPKD, CKD stage II, hypertension    Chief Complaint:  Chief Complaint   Patient presents with     Kidney Problem     CKD 2       HPI:    I had the pleasure of seeing Solomon Wilcox in the Pediatric Nephrology Clinic today for follow-up of ARPKD, CKD stage II, hypertension. Solomon is a 19 year old male accompanied by his mother.  Solomon Wilcox was last seen in the renal clinic on 8/23/18. Since then he has been doing well with no hospitalizations and no surgeries. He is not having abdominal or flank pain, headaches, gross hematuria. His energy is good. Weight has increased 5kg over the past year. He is taking his medications, although wasn't able to name his blood pressure medications. His mother is concerned that he is missing them more often. He is scheduled to see Dr. Laird this week and mom requested that labs be completed for this visit. There continues to be a significant amount of parent-child conflict.     Review of Systems:  A comprehensive review of systems was performed and found to be negative other than noted in the HPI.    Allergies:  Solomon is allergic to no clinical screening - see comments..    Active Medications:  Current Outpatient Medications   Medication Sig Dispense Refill     cholecalciferol (VITAMIN  -D) 1000 UNITS capsule Take 1 capsule by mouth daily       citalopram (CELEXA) 40 MG tablet Take 1 tablet by mouth daily.       cyanocolbalamin (VITAMIN  B-12) 500 MCG tablet Take 500 mcg by mouth daily       guanFACINE HCl (INTUNIV) 3 MG TB24 Take 1 tablet by mouth daily.       hydrochlorothiazide (HYDRODIURIL) 25 MG tablet Take 1 tablet (25 mg) by mouth 2 times daily 180 tablet 1     lisinopril (PRINIVIL/ZESTRIL) 10 MG tablet Take 1 tablet (10 mg) by mouth daily 90 tablet 3     risperiDONE (RISPERDAL) 0.5 MG tablet Take 0.5 mg by mouth 2 times daily       ursodiol (ACTIGALL) 300 MG capsule  Take 1 capsule (300 mg) by mouth 2 times daily 180 capsule 3     adapalene (DIFFERIN) 0.1 % cream Apply topically At Bedtime Apply as directed          Immunizations:  Immunization History   Administered Date(s) Administered     Comvax (HIB/HepB) 1998, 1998, 1998     DTAP (<7y) 1998, 1998, 1998, 09/02/1999, 06/13/2003     FLU 6-35 months 09/26/2011, 10/23/2012     Flu, Unspecified 10/01/2018     [id number] Novel Flu 11/15/2009     HPV Quadrivalent 04/30/2014, 06/09/2014, 10/30/2014     HepA-ped 2 Dose 12/19/2007, 06/23/2008     Influenza (IIV3) PF 10/28/2003, 10/19/2004, 10/18/2005, 11/04/2006, 10/30/2007, 11/08/2008, 10/17/2009, 10/30/2010, 09/26/2011, 11/09/2013     Influenza Vaccine IM 3yrs+ 4 Valent IIV4 09/27/2014, 09/26/2015, 09/27/2016     Influenza Vaccine Im 4yrs+ 4 Valent CCIIV4 10/10/2018     MMR 05/07/1999, 06/13/2003     Meningococcal (Menactra ) 02/26/2010     Meningococcal (Menveo ) 04/30/2014     Pedvax-hib 09/02/1999     Poliovirus, inactivated (IPV) 1998, 1998, 09/02/1999, 06/13/2003     TD (ADULT, 7+) 04/27/2009     TDAP Vaccine (Adacel) 04/27/2009     Varicella 05/07/1999, 02/26/2010        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     Liver disease, cystic, congenital      Short stature     Treated with growth hormone     Unspecified hypertensive kidney disease with chronic kidney disease stage I through stage IV, or unspecified(403.90)          PSHx:    Past Surgical History:   Procedure Laterality Date     LAPAROSCOPIC CHOLECYSTECTOMY N/A 10/24/2014    Procedure: LAPAROSCOPIC CHOLECYSTECTOMY;  Surgeon: David Gibson MD;  Location: [facility] OR     NOSE SURGERY         FHx:  Family History   Problem Relation Age of Onset      "Hypertension Father         started in his 20s     Lipids Father         high cholesterol     Myocardial Infarction Paternal Grandfather        SHx:  Social History     Tobacco Use     Smoking status: Never Smoker     Smokeless tobacco: Never Used   Substance Use Topics     Alcohol use: No     Drug use: No     Social History     Social History Narrative    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  He lives at home with his parents and younger sister in the Summer. He will be a senior at [employer] next Fall.        Physical Exam:    /71 (BP Location: Right arm, Patient Position: Sitting, Cuff Size: Adult Large)   Pulse 89   Ht 1.795 m (5' 10.67\")   Wt 106.1 kg (233 lb 14.5 oz)   BMI 32.93 kg/m      Exam:  Constitutional: healthy, alert and no distress  Head: Normocephalic. No masses, lesions, or abnormalities  Neck: Neck supple. No adenopathy. Thyroid symmetric, normal size   EYE: BETTY, EOMI,  no periorbital edema  ENT: ENT exam normal, no neck nodes   Cardiovascular: negative,  RRR. No murmurs, clicks gallops or rub  Respiratory: negative,  Lungs clear  Gastrointestinal: Abdomen soft, non-tender. BS normal. No masses, organomegaly, kidneys not palpable  : Deferred  Musculoskeletal: extremities normal- no gross deformities noted, gait normal    Skin: no suspicious lesions or rashes  Neurologic: Gait normal.   Psychiatric: mentation appears normal and affect normal   Hematologic/Lymphatic/Immunologic: normal ant/post cervical, axillary, supraclavicular nodes    Labs and Imaging:  Results for orders placed or performed in visit on 07/25/19   Renal panel   Result Value Ref Range    Sodium 138 133 - 144 mmol/L    Potassium 4.2 3.4 - 5.3 mmol/L    Chloride 105 94 - 109 mmol/L    Carbon Dioxide 29 20 - 32 mmol/L    Anion Gap 4 3 - 14 mmol/L    Glucose 89 70 - 99 mg/dL    Urea Nitrogen 28 7 - 30 mg/dL    Creatinine 1.47 (H) 0.66 - 1.25 mg/dL    GFR Estimate 67 >60 mL/min/[1.73_m2] "    GFR Estimate If Black 78 >60 mL/min/[1.73_m2]    Calcium 9.5 8.5 - 10.1 mg/dL    Phosphorus 3.6 2.5 - 4.5 mg/dL    Albumin 3.9 3.4 - 5.0 g/dL   Hemoglobin A1c   Result Value Ref Range    Hemoglobin A1C 5.4 0 - 5.6 %   Lipid Profile   Result Value Ref Range    Cholesterol 212 (H) <200 mg/dL    Triglycerides 240 (H) <150 mg/dL    HDL Cholesterol 35 (L) >39 mg/dL    LDL Cholesterol Calculated 129 (H) <100 mg/dL    Non HDL Cholesterol 177 (H) <130 mg/dL   Ferritin   Result Value Ref Range    Ferritin 82 26 - 388 ng/mL   GGT   Result Value Ref Range    GGT 84 (H) 0 - 75 U/L   Prolactin   Result Value Ref Range    Prolactin 27 (H) 2 - 18 ug/L   T4 free   Result Value Ref Range    T4 Free 0.87 0.76 - 1.46 ng/dL   TSH   Result Value Ref Range    TSH 1.30 0.40 - 4.00 mU/L   CBC with platelets differential   Result Value Ref Range    WBC 4.7 4.0 - 11.0 10e9/L    RBC Count 5.32 4.4 - 5.9 10e12/L    Hemoglobin 15.1 13.3 - 17.7 g/dL    Hematocrit 48.0 40.0 - 53.0 %    MCV 90 78 - 100 fl    MCH 28.4 26.5 - 33.0 pg    MCHC 31.5 31.5 - 36.5 g/dL    RDW 13.2 10.0 - 15.0 %    Platelet Count 261 150 - 450 10e9/L    Diff Method Automated Method     % Neutrophils 50.4 %    % Lymphocytes 30.2 %    % Monocytes 11.8 %    % Eosinophils 7.0 %    % Basophils 0.4 %    % Immature Granulocytes 0.2 %    Nucleated RBCs 0 0 /100    Absolute Neutrophil 2.4 1.6 - 8.3 10e9/L    Absolute Lymphocytes 1.4 0.8 - 5.3 10e9/L    Absolute Monocytes 0.6 0.0 - 1.3 10e9/L    Absolute Eosinophils 0.3 0.0 - 0.7 10e9/L    Absolute Basophils 0.0 0.0 - 0.2 10e9/L    Abs Immature Granulocytes 0.0 0 - 0.4 10e9/L    Absolute Nucleated RBC 0.0    Parathyroid Hormone Intact   Result Value Ref Range    Parathyroid Hormone Intact 7 (L) 18 - 80 pg/mL   Routine UA with microscopic   Result Value Ref Range    Color Urine Light Yellow     Appearance Urine Clear     Glucose Urine Negative NEG^Negative mg/dL    Bilirubin Urine Negative NEG^Negative    Ketones Urine Negative  NEG^Negative mg/dL    Specific Gravity Urine 1.010 1.003 - 1.035    Blood Urine Negative NEG^Negative    pH Urine 6.5 5.0 - 7.0 pH    Protein Albumin Urine Negative NEG^Negative mg/dL    Urobilinogen mg/dL Normal 0.0 - 2.0 mg/dL    Nitrite Urine Negative NEG^Negative    Leukocyte Esterase Urine Negative NEG^Negative    Source Unspecified Urine     WBC Urine 1 0 - 5 /HPF    RBC Urine <1 0 - 2 /HPF   Alkaline phosphatase   Result Value Ref Range    Alkaline Phosphatase 78 40 - 150 U/L   ALT   Result Value Ref Range    ALT 63 0 - 70 U/L   AST   Result Value Ref Range    AST 26 0 - 45 U/L   Bilirubin  total   Result Value Ref Range    Bilirubin Total 0.3 0.2 - 1.3 mg/dL   Bilirubin direct   Result Value Ref Range    Bilirubin Direct <0.1 0.0 - 0.2 mg/dL   Protein total   Result Value Ref Range    Protein Total 8.2 6.8 - 8.8 g/dL     EXAMINATION: US ABDOMEN COMPLETE WITH DOPPLER COMPLETE  7/25/2019 9:05  AM       CLINICAL HISTORY: ARPKC and Caroli - please perform US with Doppler  and compare to prior; Caroli disease     COMPARISON: Abdominal ultrasound 8/23/2010         TECHNIQUE: The abdomen was scanned in standard fashion with  specialized ultrasound transducer(s) using both gray-scale, color  Doppler, and spectral flow techniques.     FINDINGS:  Abdominal ultrasound exam with color and spectral Doppler. The liver  measures 20.0 cm (previously 20.0 cm) and is normal in contour and  echogenicity. Cystic ductal dilatation redemonstrated as on previous  exams measuring 7.9 x 6.2 x 5.7 cm and 6.5 x 3.6 x 3.7 cm. Stable from  prior. Mildly dilated extrahepatic bile duct measuring 5 mm. There is  no intrahepatic or extrahepatic biliary ductal dilatation. The common  bile duct measures 2.8 mm. The gallbladder has been surgically  removed.     Extrahepatic portal vein flow is antegrade, measuring 21.4 cm/sec.  Right portal vein flow is antegrade, measuring 23.8 cm/sec.  Left portal vein flow is antegrade, measuring 7.9  cm/sec.     Flow in the hepatic artery is towards the liver and:  103 cm/s peak systolic  0.50 resistive index.      The splenic vein is patent and flow is towards the liver.  The left,  middle, and right hepatic veins are patent with flow towards the IVC.  The IVC is patent with flow towards the heart.   The visualized aorta  is not dilated.     The spleen measures maximally 11.9 cm (12.5 cm) and is normal in  appearance. The visualized portions of the pancreas are normal in  echogenicity.     The visualized upper abdominal aorta and inferior vena cava are  normal.       The kidneys are normal in position with increased echogenicity and  through transmission compared with prior. The right kidney measures  12.9 cm (previously 12.0 cm) and the left kidney measures 11.8 cm  (previously 10.6 cm). There is no significant urinary tract dilation.  The urinary bladder is moderately distended and normal in morphology.  The bladder wall is normal.                                                                      IMPRESSION:   1. Intrahepatic ductal dilatation, stable from prior exam. Doppler  evaluation as normal.  2. Polycystic kidney disease.    I personally reviewed results of laboratory evaluation, imaging studies and past medical records that were available during this outpatient visit.      Assessment and Plan:    Solomon is a [age] year old young man with ARPKD who is doing well medically.     1. CKD stage II due to ARPKD: Estimated GFR is 67 ml/min/1.73m2 by CKD-EPI equation and similar to a year ago. Creatinine has been very stable over the past few years. Recheck labs with adult nephrology visit in 6 months. Solomon and his mother were provided names of suggested providers in adult nephrology at the [facility]. He has no evidence for anemia or hyperparathyroidism related to his CKD. He was encourage to learn the names of his medications and what they do. We reviewed his blood pressure medications today.      2.  Hypertension with CKD stage II: Blood pressures are under good control on lisinopril and HCTZ. Goal blood pressures are <120/80. He should have blood pressures checked at each clinic visit. He does not have evidence for hyperkalemia as a side effect of his lisinopril. He is not having dizziness or lightheadedness. He should stay well hydrated as he is at risk of acute kidney injury if dehydrated while taking lisinopril. He previously had hyperkalemia with lisinopril that resolved after starting the thizide.     3. Liver cysts: Followed by Dr. Talavera in GI clinic.  Continue urodiol per GI. Follow up appointment scheduled. Also planning hepatology transition.     4. Splenic cysts: Seen on MRI    5. Elevated cholesterol: Recommend follow up with internal medicine or family practice provider to establish primary care and to determine if lipid lowering therapy would be beneficial.  Overall improving.      Medication monitoring labs were obtained for Dr. Laird and these were faxed to his clinic.     Patient Education: During this visit I discussed in detail the patient s symptoms, physical exam and evaluation results findings, tentative diagnosis as well as the treatment plan (Including but not limited to possible side effects and complications related to the disease, treatment modalities and intervention(s). Family expressed understanding and consent. Family was receptive and ready to learn; no apparent learning barriers were identified.    Follow up: No follow-ups on file. Please return sooner should Solomon become symptomatic.      Sincerely,    Taina Ni MD   Pediatric Nephrology    CC:   Patient Care Team:  Brando Yost as PCP - General  Brando Yost as Referring Physician (Internal Medicine)  Taina Ni MD as MD (Pediatric Nephrology)  Abhinav Laird MD as MD (Psychiatry)      Copy to patient    Parent(s) of Solomon Wilcox  [address] [facility] [address]

## 2019-07-25 NOTE — NURSING NOTE
"[facility] [[id number]]  Chief Complaint   Patient presents with     Kidney Problem     CKD 2     Initial /71 (BP Location: Right arm, Patient Position: Sitting, Cuff Size: Adult Large)   Pulse 89   Ht 1.795 m (5' 10.67\")   Wt 106.1 kg (233 lb 14.5 oz)   BMI 32.93 kg/m   Estimated body mass index is 32.93 kg/m  as calculated from the following:    Height as of this encounter: 1.795 m (5' 10.67\").    Weight as of this encounter: 106.1 kg (233 lb 14.5 oz).  Medication Reconciliation: complete    "

## 2019-07-25 NOTE — PATIENT INSTRUCTIONS
[facility]  Pediatric Specialty Clinic [place]      Pediatric Call Center [phone], option 1  Inez Collier RN Care Coordinator:  [phone]    After Hours Needing Immediate Care:  [phone].  Ask for the on-call pediatric doctor for the specialty you are calling for be paged.  For dermatology urgent matters that cannot wait until the next business day, is over a holiday and/or a weekend please call [phone] and ask for the Dermatology Resident On-Call to be paged.    Prescription Renewals:  Please call your pharmacy first.  Your pharmacy must fax requests to [phone].  Please allow 2-3 days for prescriptions to be authorized.    If your physician has ordered a CT or MRI, you may schedule this test by calling [facility] in [place] at [phone].    **If your child is having a sedated procedure, they will need a history and physical done at their Primary Care Provider within 30 days of the procedure.  If your child was seen by the ordering provider in our office within 30 days of the procedure, their visit summary will work for the H&P unless they inform you otherwise.  If you have any questions, please call the RN Care Coordinator.**    Adult nephrology, follow up in 6 months:   Dilip Mckeon

## 2019-07-26 ENCOUNTER — TELEPHONE (OUTPATIENT)
Dept: NEPHROLOGY | Facility: CLINIC | Age: 19
End: 2019-07-26

## 2019-07-26 LAB
CANCER AG19-9 SERPL-ACNC: 22 U/ML (ref 0–37)
DEPRECATED CALCIDIOL+CALCIFEROL SERPL-MC: <50 UG/L (ref 20–75)
VITAMIN D2 SERPL-MCNC: <5 UG/L
VITAMIN D3 SERPL-MCNC: 45 UG/L

## 2019-07-26 NOTE — TELEPHONE ENCOUNTER
Sent a [employer] message to Solomon with the results.    Inez Collier, RN Care Coordinator  [facility]

## 2019-07-26 NOTE — TELEPHONE ENCOUNTER
----- Message from Taina Ni MD sent at 7/26/2019  9:26 AM CDT -----  Please let Solomon know labs are stable. Creatinine 1.47 compared to 1.46 in February. Cholesterol a little improved. No changes to medications. I'll send the results to Dr. Laird's office as well.     Taina

## 2019-08-09 ENCOUNTER — OFFICE VISIT (OUTPATIENT)
Dept: GASTROENTEROLOGY | Facility: CLINIC | Age: 19
End: 2019-08-09
Payer: COMMERCIAL

## 2019-08-09 VITALS
HEIGHT: 71 IN | BODY MASS INDEX: 32.75 KG/M2 | WEIGHT: 233.91 LBS | DIASTOLIC BLOOD PRESSURE: 81 MMHG | HEART RATE: 92 BPM | SYSTOLIC BLOOD PRESSURE: 130 MMHG

## 2019-08-09 DIAGNOSIS — R74.8 ELEVATED LIVER ENZYMES: ICD-10-CM

## 2019-08-09 DIAGNOSIS — Q87.89 CAROLI SYNDROME: ICD-10-CM

## 2019-08-09 ASSESSMENT — PAIN SCALES - GENERAL: PAINLEVEL: NO PAIN (0)

## 2019-08-09 ASSESSMENT — MIFFLIN-ST. JEOR: SCORE: 2082.87

## 2019-08-09 NOTE — PROGRESS NOTES
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 Last seen in August 2018.     INTERVAL Hx: Solomon returns today with for scheduled yearly follow-up. Recently saw Peds Nephrology. Renal functional reported as stable. Abdominal US with Doppler unchanged including no liver masses.     Solomon has no concerns today. He would like a recommendation for an adult hepatologist. He reports no abdominal pain, nausea, fatigue, constipation, diarrhea or unexpected bleeding.      He drinks socially in moderation. He does not use tobacco, drugs or supplements.       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, 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     Liver disease, cystic, congenital      Short stature     Treated with growth hormone     Unspecified hypertensive kidney disease with chronic kidney disease stage I through stage IV, or unspecified(403.90)        Past Surgical History:   Procedure Laterality Date     LAPAROSCOPIC CHOLECYSTECTOMY N/A 10/24/2014    Procedure: LAPAROSCOPIC CHOLECYSTECTOMY;  Surgeon: David Gibson MD;  Location: [facility]     NOSE SURGERY         Allergies   Allergen Reactions     No Clinical Screening - See Comments      Pt with hx of polycystic kidney disease  Please avoid ibuprofen       Outpatient Medications Prior to Visit   Medication Sig Dispense Refill     adapalene (DIFFERIN) 0.1 % cream Apply topically At Bedtime Apply as directed       cholecalciferol (VITAMIN  -D) 1000 UNITS capsule Take 1 capsule by mouth daily       citalopram (CELEXA) 40 MG tablet Take 1 tablet by mouth daily.       cyanocolbalamin (VITAMIN  B-12)  "500 MCG tablet Take 500 mcg by mouth daily       guanFACINE HCl (INTUNIV) 3 MG TB24 Take 1 tablet by mouth daily.       hydrochlorothiazide (HYDRODIURIL) 25 MG tablet Take 1 tablet (25 mg) by mouth 2 times daily 180 tablet 1     lisinopril (PRINIVIL/ZESTRIL) 10 MG tablet Take 1 tablet (10 mg) by mouth daily 90 tablet 3     risperiDONE (RISPERDAL) 0.5 MG tablet Take 0.5 mg by mouth 2 times daily       ursodiol (ACTIGALL) 300 MG capsule Take 1 capsule (300 mg) by mouth 2 times daily 180 capsule 3     No facility-administered medications prior to visit.        Family History   Problem Relation Age of Onset     Hypertension Father         started in his 20s     Lipids Father         high cholesterol     Myocardial Infarction Paternal Grandfather        Social History: Has been living at home this summer with parents and 18yo sister. He is going to be a senior in college at [employer]. He is studying environmental studies. Drinks alcohol socially. No tobacco use. 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     Review of Systems: As above. All other systems negative per complete ROS per patient questionnaire.     Physical Exam: /81 (BP Location: Right arm, Patient Position: Sitting, Cuff Size: Adult Large)   Pulse 92   Ht 1.795 m (5' 10.67\")   Wt 106.1 kg (233 lb 14.5 oz)   BMI 32.93 kg/m    GEN: WDWN male in no acute distress. Pleasant, interactive. Answers questions appropriately. Cooperative with exam.   HEENT: NC/AT. Pupils equal and round. No scleral icterus. No rhinorrhea. MMMs.   PULM: CTAB. Breath sounds symmetric. No wheezes or crackles.  CV: RRR. Normal S1, S2. No murmurs.  ABD: Nondistended. Normoactive bowel sounds. Soft, no tenderness to palpation. No HSM or other masses appreciated.   EXT: No deformities, no clubbing. Cap refill <2sec. Radial pulse 2+.   SKIN: No jaundice, bruising or petechiae on incomplete skin exam.    Results Reviewed:   Recent Results (from the " past 672 hour(s))   Renal panel    Collection Time: 07/25/19  9:02 AM   Result Value Ref Range    Sodium 138 133 - 144 mmol/L    Potassium 4.2 3.4 - 5.3 mmol/L    Chloride 105 94 - 109 mmol/L    Carbon Dioxide 29 20 - 32 mmol/L    Anion Gap 4 3 - 14 mmol/L    Glucose 89 70 - 99 mg/dL    Urea Nitrogen 28 7 - 30 mg/dL    Creatinine 1.47 (H) 0.66 - 1.25 mg/dL    GFR Estimate 67 >60 mL/min/[1.73_m2]    GFR Estimate If Black 78 >60 mL/min/[1.73_m2]    Calcium 9.5 8.5 - 10.1 mg/dL    Phosphorus 3.6 2.5 - 4.5 mg/dL    Albumin 3.9 3.4 - 5.0 g/dL   Hemoglobin A1c    Collection Time: 07/25/19  9:02 AM   Result Value Ref Range    Hemoglobin A1C 5.4 0 - 5.6 %   Lipid Profile    Collection Time: 07/25/19  9:02 AM   Result Value Ref Range    Cholesterol 212 (H) <200 mg/dL    Triglycerides 240 (H) <150 mg/dL    HDL Cholesterol 35 (L) >39 mg/dL    LDL Cholesterol Calculated 129 (H) <100 mg/dL    Non HDL Cholesterol 177 (H) <130 mg/dL   Ferritin    Collection Time: 07/25/19  9:02 AM   Result Value Ref Range    Ferritin 82 26 - 388 ng/mL   GGT    Collection Time: 07/25/19  9:02 AM   Result Value Ref Range    GGT 84 (H) 0 - 75 U/L   Prolactin    Collection Time: 07/25/19  9:02 AM   Result Value Ref Range    Prolactin 27 (H) 2 - 18 ug/L   T4 free    Collection Time: 07/25/19  9:02 AM   Result Value Ref Range    T4 Free 0.87 0.76 - 1.46 ng/dL   TSH    Collection Time: 07/25/19  9:02 AM   Result Value Ref Range    TSH 1.30 0.40 - 4.00 mU/L   25 OH Vit D therapy monitoring    Collection Time: 07/25/19  9:02 AM   Result Value Ref Range    25 OH Vit D2 <5 ug/L    25 OH Vit D3 45 ug/L    25 OH Vit D total <50 20 - 75 ug/L   CBC with platelets differential    Collection Time: 07/25/19  9:02 AM   Result Value Ref Range    WBC 4.7 4.0 - 11.0 10e9/L    RBC Count 5.32 4.4 - 5.9 10e12/L    Hemoglobin 15.1 13.3 - 17.7 g/dL    Hematocrit 48.0 40.0 - 53.0 %    MCV 90 78 - 100 fl    MCH 28.4 26.5 - 33.0 pg    MCHC 31.5 31.5 - 36.5 g/dL    RDW 13.2  10.0 - 15.0 %    Platelet Count 261 150 - 450 10e9/L    Diff Method Automated Method     % Neutrophils 50.4 %    % Lymphocytes 30.2 %    % Monocytes 11.8 %    % Eosinophils 7.0 %    % Basophils 0.4 %    % Immature Granulocytes 0.2 %    Nucleated RBCs 0 0 /100    Absolute Neutrophil 2.4 1.6 - 8.3 10e9/L    Absolute Lymphocytes 1.4 0.8 - 5.3 10e9/L    Absolute Monocytes 0.6 0.0 - 1.3 10e9/L    Absolute Eosinophils 0.3 0.0 - 0.7 10e9/L    Absolute Basophils 0.0 0.0 - 0.2 10e9/L    Abs Immature Granulocytes 0.0 0 - 0.4 10e9/L    Absolute Nucleated RBC 0.0    Parathyroid Hormone Intact    Collection Time: 07/25/19  9:02 AM   Result Value Ref Range    Parathyroid Hormone Intact 7 (L) 18 - 80 pg/mL   Routine UA with microscopic    Collection Time: 07/25/19  9:02 AM   Result Value Ref Range    Color Urine Light Yellow     Appearance Urine Clear     Glucose Urine Negative NEG^Negative mg/dL    Bilirubin Urine Negative NEG^Negative    Ketones Urine Negative NEG^Negative mg/dL    Specific Gravity Urine 1.010 1.003 - 1.035    Blood Urine Negative NEG^Negative    pH Urine 6.5 5.0 - 7.0 pH    Protein Albumin Urine Negative NEG^Negative mg/dL    Urobilinogen mg/dL Normal 0.0 - 2.0 mg/dL    Nitrite Urine Negative NEG^Negative    Leukocyte Esterase Urine Negative NEG^Negative    Source Unspecified Urine     WBC Urine 1 0 - 5 /HPF    RBC Urine <1 0 - 2 /HPF   Alkaline phosphatase    Collection Time: 07/25/19  9:02 AM   Result Value Ref Range    Alkaline Phosphatase 78 40 - 150 U/L   ALT    Collection Time: 07/25/19  9:02 AM   Result Value Ref Range    ALT 63 0 - 70 U/L   AST    Collection Time: 07/25/19  9:02 AM   Result Value Ref Range    AST 26 0 - 45 U/L   Bilirubin  total    Collection Time: 07/25/19  9:02 AM   Result Value Ref Range    Bilirubin Total 0.3 0.2 - 1.3 mg/dL   Bilirubin direct    Collection Time: 07/25/19  9:02 AM   Result Value Ref Range    Bilirubin Direct <0.1 0.0 - 0.2 mg/dL   Protein total    Collection Time:  07/25/19  9:02 AM   Result Value Ref Range    Protein Total 8.2 6.8 - 8.8 g/dL   AFP tumor marker    Collection Time: 07/25/19  9:02 AM   Result Value Ref Range    Alpha Fetoprotein <1.5 0 - 8 ug/L   Cancer antigen 19-9    Collection Time: 07/25/19  9:02 AM   Result Value Ref Range    Cancer Antigen 19-9 22 0 - 37 U/mL     Recent Results (from the past 744 hour(s))   US Abdomen Complete w Doppler Complete    Narrative    EXAMINATION: US ABDOMEN COMPLETE WITH DOPPLER COMPLETE  7/25/2019 9:05  AM      CLINICAL HISTORY: ARPKC and Caroli - please perform US with Doppler  and compare to prior; Caroli disease    COMPARISON: Abdominal ultrasound 8/23/2010        TECHNIQUE: The abdomen was scanned in standard fashion with  specialized ultrasound transducer(s) using both gray-scale, color  Doppler, and spectral flow techniques.    FINDINGS:  Abdominal ultrasound exam with color and spectral Doppler. The liver  measures 20.0 cm (previously 20.0 cm) and is normal in contour and  echogenicity. Cystic ductal dilatation redemonstrated as on previous  exams measuring 7.9 x 6.2 x 5.7 cm and 6.5 x 3.6 x 3.7 cm. Stable from  prior. Mildly dilated extrahepatic bile duct measuring 5 mm. There is  no intrahepatic or extrahepatic biliary ductal dilatation. The common  bile duct measures 2.8 mm. The gallbladder has been surgically  removed.    Extrahepatic portal vein flow is antegrade, measuring 21.4 cm/sec.  Right portal vein flow is antegrade, measuring 23.8 cm/sec.  Left portal vein flow is antegrade, measuring 7.9 cm/sec.    Flow in the hepatic artery is towards the liver and:  103 cm/s peak systolic  0.50 resistive index.     The splenic vein is patent and flow is towards the liver.  The left,  middle, and right hepatic veins are patent with flow towards the IVC.  The IVC is patent with flow towards the heart.   The visualized aorta  is not dilated.    The spleen measures maximally 11.9 cm (12.5 cm) and is normal in  appearance. The  visualized portions of the pancreas are normal in  echogenicity.    The visualized upper abdominal aorta and inferior vena cava are  normal.      The kidneys are normal in position with increased echogenicity and  through transmission compared with prior. The right kidney measures  12.9 cm (previously 12.0 cm) and the left kidney measures 11.8 cm  (previously 10.6 cm). There is no significant urinary tract dilation.  The urinary bladder is moderately distended and normal in morphology.  The bladder wall is normal.      Impression    IMPRESSION:   1. Intrahepatic ductal dilatation, stable from prior exam. Doppler  evaluation as normal.  2. Polycystic kidney disease.    I have personally reviewed the examination and initial interpretation  and I agree with the findings.    SHERINE ESPARZA MD       Assessment: Solomon is a 19 year old male with  1. ARPKD with CKD stage II and hypertension  2. 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 Mildly elevated liver enzymes - ALT 63, GGT 84  4. Normal AFP  5. Hypertriglyceridemia    Plan:  1. Repeat liver panel, GGT and AFP in 6 months.   2. Limit alcohol consumption. No tobacco use.   3. Discussed discontinuing ursodiol - Solomon prefers to continue.   4. Follow-up with PCP regarding hypertriglyceridemia.   5. Transition to adult Hepatology at the [facility]. Please contact us with any questions in the meantime.     Sincerely,    Shelby Talavera MD  Pediatric Gastroenterology  [facility]      CC  Brando Yost Michelle (Pediatric Nephrology)

## 2019-08-09 NOTE — NURSING NOTE
"[facility] [[id number]]  No chief complaint on file.    Initial /81 (BP Location: Right arm, Patient Position: Sitting, Cuff Size: Adult Large)   Pulse 92   Ht 1.795 m (5' 10.67\")   Wt 106.1 kg (233 lb 14.5 oz)   BMI 32.93 kg/m   Estimated body mass index is 32.93 kg/m  as calculated from the following:    Height as of this encounter: 1.795 m (5' 10.67\").    Weight as of this encounter: 106.1 kg (233 lb 14.5 oz).  Medication Reconciliation: complete    "

## 2019-08-09 NOTE — PATIENT INSTRUCTIONS
[facility]  [facility] [place]      Pediatric Call Center [phone], option 1  Inez Collier RN Care Coordinator:  [phone]    After Hours Needing Immediate Care:  [phone].  Ask for the on-call pediatric doctor for the specialty you are calling for be paged.  For dermatology urgent matters that cannot wait until the next business day, is over a holiday and/or a weekend please call [phone] and ask for the Dermatology Resident On-Call to be paged.    Prescription Renewals:  Please call your pharmacy first.  Your pharmacy must fax requests to [phone].  Please allow 2-3 days for prescriptions to be authorized.    If your physician has ordered a CT or MRI, you may schedule this test by calling [facility] in [place] at [phone].    **If your child is having a sedated procedure, they will need a history and physical done at their Primary Care Provider within 30 days of the procedure.  If your child was seen by the ordering provider in our office within 30 days of the procedure, their visit summary will work for the H&P unless they inform you otherwise.  If you have any questions, please call the RN Care Coordinator.**

## 2019-08-09 NOTE — LETTER
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 Last seen in August 2018.     INTERVAL Hx: Solomon returns today with for scheduled yearly follow-up. Recently saw Peds Nephrology. Renal functional reported as stable. Abdominal US with Doppler unchanged including no liver masses.     Solomon has no concerns today. He would like a recommendation for an adult hepatologist. He reports no abdominal pain, nausea, fatigue, constipation, diarrhea or unexpected bleeding.      He drinks socially in moderation. He does not use tobacco, drugs or supplements.       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, 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     Liver disease, cystic, congenital      Short stature     Treated with growth hormone     Unspecified hypertensive kidney disease with chronic kidney disease stage I through stage IV, or unspecified(403.90)        Past Surgical History:   Procedure Laterality Date     LAPAROSCOPIC CHOLECYSTECTOMY N/A 10/24/2014    Procedure: LAPAROSCOPIC CHOLECYSTECTOMY;  Surgeon: David Gibson MD;  Location: [facility] OR     NOSE SURGERY         Allergies   Allergen Reactions     No Clinical Screening - See Comments      Pt with hx of polycystic kidney disease  Please avoid ibuprofen       Outpatient Medications Prior to Visit   Medication Sig Dispense Refill     adapalene (DIFFERIN) 0.1 % cream Apply topically At Bedtime Apply as directed       cholecalciferol (VITAMIN  -D) 1000 UNITS capsule Take 1 capsule by mouth daily       citalopram  "(CELEXA) 40 MG tablet Take 1 tablet by mouth daily.       cyanocolbalamin (VITAMIN  B-12) 500 MCG tablet Take 500 mcg by mouth daily       guanFACINE HCl (INTUNIV) 3 MG TB24 Take 1 tablet by mouth daily.       hydrochlorothiazide (HYDRODIURIL) 25 MG tablet Take 1 tablet (25 mg) by mouth 2 times daily 180 tablet 1     lisinopril (PRINIVIL/ZESTRIL) 10 MG tablet Take 1 tablet (10 mg) by mouth daily 90 tablet 3     risperiDONE (RISPERDAL) 0.5 MG tablet Take 0.5 mg by mouth 2 times daily       ursodiol (ACTIGALL) 300 MG capsule Take 1 capsule (300 mg) by mouth 2 times daily 180 capsule 3     No facility-administered medications prior to visit.        Family History   Problem Relation Age of Onset     Hypertension Father         started in his 20s     Lipids Father         high cholesterol     Myocardial Infarction Paternal Grandfather        Social History: Has been living at home this summer with parents and 18yo sister. He is going to be a senior in college at [employer]. He is studying environmental studies. Drinks alcohol socially. No tobacco use. 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     Review of Systems: As above. All other systems negative per complete ROS per patient questionnaire.     Physical Exam: /81 (BP Location: Right arm, Patient Position: Sitting, Cuff Size: Adult Large)   Pulse 92   Ht 1.795 m (5' 10.67\")   Wt 106.1 kg (233 lb 14.5 oz)   BMI 32.93 kg/m     GEN: WDWN male in no acute distress. Pleasant, interactive. Answers questions appropriately. Cooperative with exam.   HEENT: NC/AT. Pupils equal and round. No scleral icterus. No rhinorrhea. MMMs.   PULM: CTAB. Breath sounds symmetric. No wheezes or crackles.  CV: RRR. Normal S1, S2. No murmurs.  ABD: Nondistended. Normoactive bowel sounds. Soft, no tenderness to palpation. No HSM or other masses appreciated.   EXT: No deformities, no clubbing. Cap refill <2sec. Radial pulse 2+.   SKIN: No jaundice, " bruising or petechiae on incomplete skin exam.    Results Reviewed:   Recent Results (from the past 672 hour(s))   Renal panel    Collection Time: 07/25/19  9:02 AM   Result Value Ref Range    Sodium 138 133 - 144 mmol/L    Potassium 4.2 3.4 - 5.3 mmol/L    Chloride 105 94 - 109 mmol/L    Carbon Dioxide 29 20 - 32 mmol/L    Anion Gap 4 3 - 14 mmol/L    Glucose 89 70 - 99 mg/dL    Urea Nitrogen 28 7 - 30 mg/dL    Creatinine 1.47 (H) 0.66 - 1.25 mg/dL    GFR Estimate 67 >60 mL/min/[1.73_m2]    GFR Estimate If Black 78 >60 mL/min/[1.73_m2]    Calcium 9.5 8.5 - 10.1 mg/dL    Phosphorus 3.6 2.5 - 4.5 mg/dL    Albumin 3.9 3.4 - 5.0 g/dL   Hemoglobin A1c    Collection Time: 07/25/19  9:02 AM   Result Value Ref Range    Hemoglobin A1C 5.4 0 - 5.6 %   Lipid Profile    Collection Time: 07/25/19  9:02 AM   Result Value Ref Range    Cholesterol 212 (H) <200 mg/dL    Triglycerides 240 (H) <150 mg/dL    HDL Cholesterol 35 (L) >39 mg/dL    LDL Cholesterol Calculated 129 (H) <100 mg/dL    Non HDL Cholesterol 177 (H) <130 mg/dL   Ferritin    Collection Time: 07/25/19  9:02 AM   Result Value Ref Range    Ferritin 82 26 - 388 ng/mL   GGT    Collection Time: 07/25/19  9:02 AM   Result Value Ref Range    GGT 84 (H) 0 - 75 U/L   Prolactin    Collection Time: 07/25/19  9:02 AM   Result Value Ref Range    Prolactin 27 (H) 2 - 18 ug/L   T4 free    Collection Time: 07/25/19  9:02 AM   Result Value Ref Range    T4 Free 0.87 0.76 - 1.46 ng/dL   TSH    Collection Time: 07/25/19  9:02 AM   Result Value Ref Range    TSH 1.30 0.40 - 4.00 mU/L   25 OH Vit D therapy monitoring    Collection Time: 07/25/19  9:02 AM   Result Value Ref Range    25 OH Vit D2 <5 ug/L    25 OH Vit D3 45 ug/L    25 OH Vit D total <50 20 - 75 ug/L   CBC with platelets differential    Collection Time: 07/25/19  9:02 AM   Result Value Ref Range    WBC 4.7 4.0 - 11.0 10e9/L    RBC Count 5.32 4.4 - 5.9 10e12/L    Hemoglobin 15.1 13.3 - 17.7 g/dL    Hematocrit 48.0 40.0 - 53.0 %     MCV 90 78 - 100 fl    MCH 28.4 26.5 - 33.0 pg    MCHC 31.5 31.5 - 36.5 g/dL    RDW 13.2 10.0 - 15.0 %    Platelet Count 261 150 - 450 10e9/L    Diff Method Automated Method     % Neutrophils 50.4 %    % Lymphocytes 30.2 %    % Monocytes 11.8 %    % Eosinophils 7.0 %    % Basophils 0.4 %    % Immature Granulocytes 0.2 %    Nucleated RBCs 0 0 /100    Absolute Neutrophil 2.4 1.6 - 8.3 10e9/L    Absolute Lymphocytes 1.4 0.8 - 5.3 10e9/L    Absolute Monocytes 0.6 0.0 - 1.3 10e9/L    Absolute Eosinophils 0.3 0.0 - 0.7 10e9/L    Absolute Basophils 0.0 0.0 - 0.2 10e9/L    Abs Immature Granulocytes 0.0 0 - 0.4 10e9/L    Absolute Nucleated RBC 0.0    Parathyroid Hormone Intact    Collection Time: 07/25/19  9:02 AM   Result Value Ref Range    Parathyroid Hormone Intact 7 (L) 18 - 80 pg/mL   Routine UA with microscopic    Collection Time: 07/25/19  9:02 AM   Result Value Ref Range    Color Urine Light Yellow     Appearance Urine Clear     Glucose Urine Negative NEG^Negative mg/dL    Bilirubin Urine Negative NEG^Negative    Ketones Urine Negative NEG^Negative mg/dL    Specific Gravity Urine 1.010 1.003 - 1.035    Blood Urine Negative NEG^Negative    pH Urine 6.5 5.0 - 7.0 pH    Protein Albumin Urine Negative NEG^Negative mg/dL    Urobilinogen mg/dL Normal 0.0 - 2.0 mg/dL    Nitrite Urine Negative NEG^Negative    Leukocyte Esterase Urine Negative NEG^Negative    Source Unspecified Urine     WBC Urine 1 0 - 5 /HPF    RBC Urine <1 0 - 2 /HPF   Alkaline phosphatase    Collection Time: 07/25/19  9:02 AM   Result Value Ref Range    Alkaline Phosphatase 78 40 - 150 U/L   ALT    Collection Time: 07/25/19  9:02 AM   Result Value Ref Range    ALT 63 0 - 70 U/L   AST    Collection Time: 07/25/19  9:02 AM   Result Value Ref Range    AST 26 0 - 45 U/L   Bilirubin  total    Collection Time: 07/25/19  9:02 AM   Result Value Ref Range    Bilirubin Total 0.3 0.2 - 1.3 mg/dL   Bilirubin direct    Collection Time: 07/25/19  9:02 AM   Result  Value Ref Range    Bilirubin Direct <0.1 0.0 - 0.2 mg/dL   Protein total    Collection Time: 07/25/19  9:02 AM   Result Value Ref Range    Protein Total 8.2 6.8 - 8.8 g/dL   AFP tumor marker    Collection Time: 07/25/19  9:02 AM   Result Value Ref Range    Alpha Fetoprotein <1.5 0 - 8 ug/L   Cancer antigen 19-9    Collection Time: 07/25/19  9:02 AM   Result Value Ref Range    Cancer Antigen 19-9 22 0 - 37 U/mL     Recent Results (from the past 744 hour(s))   US Abdomen Complete w Doppler Complete    Narrative    EXAMINATION: US ABDOMEN COMPLETE WITH DOPPLER COMPLETE  7/25/2019 9:05  AM      CLINICAL HISTORY: ARPKC and Caroli - please perform US with Doppler  and compare to prior; Caroli disease    COMPARISON: Abdominal ultrasound 8/23/2010        TECHNIQUE: The abdomen was scanned in standard fashion with  specialized ultrasound transducer(s) using both gray-scale, color  Doppler, and spectral flow techniques.    FINDINGS:  Abdominal ultrasound exam with color and spectral Doppler. The liver  measures 20.0 cm (previously 20.0 cm) and is normal in contour and  echogenicity. Cystic ductal dilatation redemonstrated as on previous  exams measuring 7.9 x 6.2 x 5.7 cm and 6.5 x 3.6 x 3.7 cm. Stable from  prior. Mildly dilated extrahepatic bile duct measuring 5 mm. There is  no intrahepatic or extrahepatic biliary ductal dilatation. The common  bile duct measures 2.8 mm. The gallbladder has been surgically  removed.    Extrahepatic portal vein flow is antegrade, measuring 21.4 cm/sec.  Right portal vein flow is antegrade, measuring 23.8 cm/sec.  Left portal vein flow is antegrade, measuring 7.9 cm/sec.    Flow in the hepatic artery is towards the liver and:  103 cm/s peak systolic  0.50 resistive index.     The splenic vein is patent and flow is towards the liver.  The left,  middle, and right hepatic veins are patent with flow towards the IVC.  The IVC is patent with flow towards the heart.   The visualized aorta  is not  dilated.    The spleen measures maximally 11.9 cm (12.5 cm) and is normal in  appearance. The visualized portions of the pancreas are normal in  echogenicity.    The visualized upper abdominal aorta and inferior vena cava are  normal.      The kidneys are normal in position with increased echogenicity and  through transmission compared with prior. The right kidney measures  12.9 cm (previously 12.0 cm) and the left kidney measures 11.8 cm  (previously 10.6 cm). There is no significant urinary tract dilation.  The urinary bladder is moderately distended and normal in morphology.  The bladder wall is normal.      Impression    IMPRESSION:   1. Intrahepatic ductal dilatation, stable from prior exam. Doppler  evaluation as normal.  2. Polycystic kidney disease.    I have personally reviewed the examination and initial interpretation  and I agree with the findings.    SHERINE ESPARZA MD       Assessment: Solomon is a 19 year old male with  1. ARPKD with CKD stage II and hypertension  2. 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 Mildly elevated liver enzymes - ALT 63, GGT 84  4. Normal AFP  5. Hypertriglyceridemia    Plan:  1. Repeat liver panel, GGT and AFP in 6 months.   2. Limit alcohol consumption. No tobacco use.   3. Discussed discontinuing ursodiol - Solomon prefers to continue.   4. Follow-up with PCP regarding hypertriglyceridemia.   5. Transition to adult Hepatology at the [facility]. Please contact us with any questions in the meantime.     Sincerely,    Shelby Talavera MD  Pediatric Gastroenterology  [facility]      CC  Brando Yost Michelle (Pediatric Nephrology)

## 2019-10-17 NOTE — TELEPHONE ENCOUNTER
RECORDS RECEIVED FROM: Internal - Autosomal recessive polycystic kidney disease (ARPKD), referral of Dr. Taina Ni, transitioning from PEDS with Dr. Shelby Talavera, records all internal, appt per Pt's benjamin Rodrigez   DATE RECEIVED: 02.03.2020   NOTES STATUS DETAILS   OFFICE NOTE from referring provider Internal 10.16.2019   OFFICE NOTE from other specialist  Internal 08.28.201     *Only VASCULITIS or LUPUS gather office notes for the following N/A    *PULMONARY   N/A    *ENT N/A    *DERMATOLOGY N/A    *RHEUMATOLOGY N/A    DISCHARGE SUMMARY from hospital N/A    DISCHARGE REPORT from the ER N/A    MEDICATION LIST Internal / CE    IMAGING  (NEED IMAGES AND REPORTS)     KIDNEY CT SCAN N/A    KIDNEY ULTRASOUND Internal 07.25.2019   MR ABDOMEN Internal 08.04.2017   NUCLEAR MEDICINE RENAL N/A    LABS     CBC Internal 07.25.2019   CMP N/A    BMP N/A    UA Internal 07.25.2019   URINE PROTEIN Internal 07.25.2019   RENAL PANEL Internal 07.25.2019   BIOPSY     KIDNEY BIOPSY  N/A

## 2019-10-17 NOTE — TELEPHONE ENCOUNTER
carmen  RECORDS RECEIVED FROM: Internal - Autosomal recessive polycystic kidney disease (ARPKD), referral of Dr. Taina Ni, transitioning from PEDS, records all internal, appt per Pt's benjamin Rodrigez   DATE RECEIVED: 02.03.2020   NOTES STATUS DETAILS   OFFICE NOTE from referring provider Internal 09.03.2019   OFFICE NOTES from other specialists Internal 08.28.2019 08.31.2015   DISCHARGE SUMMARY from hospital Internal    MEDICATION LIST Internal / CE     LIVER BIOSPY (IF APPLICABLE)      PATHOLOGY REPORTS  N/A    IMAGING     ENDOSCOPY (IF AVAILABLE) N/A    COLONOSCOPY (IF AVAILABLE) N/A    ULTRASOUND LIVER Internal 07.25.2019   CT OF ABDOMEN N/A    MRI OF LIVER Internal 08.04.2017   FIBROSCAN, US ELASTOGRAPHY, FIBROSIS SCAN, MR ELASTOGRAPHY N/A    LABS     HEPATIC PANEL (LIVER PANEL) Care Everywhere [id number]   BASIC METABOLIC PANEL N/A    COMPLETE METABOLIC PANEL N/A    COMPLETE BLOOD COUNT (CBC) Internal 07.25.2019   INTERNATIONAL NORMALIZED RATIO (INR) N/A    HEPATITIS C ANTIBODY N/A    HEPATITIS C VIRAL LOAD/PCR N/A    HEPATITIS C GENOTYPE N/A    HEPATITIS B SURFACE ANTIGEN N/A    HEPATITIS B SURFACE ANTIBODY N/A    HEPATITIS B DNA QUANT LEVEL N/A    HEPATITIS B CORE ANTIBODY N/A

## 2019-11-02 ENCOUNTER — HEALTH MAINTENANCE LETTER (OUTPATIENT)
Age: 19
End: 2019-11-02

## 2020-01-08 ENCOUNTER — TRANSCRIBE ORDERS (OUTPATIENT)
Dept: OTHER | Age: 20
End: 2020-01-08

## 2020-01-08 DIAGNOSIS — Q44.70 CONGENITAL ANOMALY OF GALLBLADDER, BILE DUCTS, AND LIVER: Primary | ICD-10-CM

## 2020-01-08 DIAGNOSIS — Q44.1 CONGENITAL ANOMALY OF GALLBLADDER, BILE DUCTS, AND LIVER: Primary | ICD-10-CM

## 2020-01-08 DIAGNOSIS — Q44.5 CONGENITAL ANOMALY OF GALLBLADDER, BILE DUCTS, AND LIVER: Primary | ICD-10-CM

## 2020-01-24 DIAGNOSIS — N18.2 CKD (CHRONIC KIDNEY DISEASE), STAGE II: Primary | ICD-10-CM

## 2020-01-30 ENCOUNTER — TELEPHONE (OUTPATIENT)
Dept: NEPHROLOGY | Facility: CLINIC | Age: 20
End: 2020-01-30

## 2020-01-30 ENCOUNTER — TELEPHONE (OUTPATIENT)
Dept: GASTROENTEROLOGY | Facility: CLINIC | Age: 20
End: 2020-01-30

## 2020-01-30 NOTE — TELEPHONE ENCOUNTER
Patient contacted and reminded of upcoming appointment.  Patient confirmed they will be attending.  Patient instructed to bring updated medications list to appointment.Benita Bella/ZABRINA  January 30, 2020 3:23 PM

## 2020-01-30 NOTE — TELEPHONE ENCOUNTER
Spoke with Rain and reminded of pt upcoming appointment.  Patient instructed to bring updated medications list to appointment.

## 2020-02-03 ENCOUNTER — PRE VISIT (OUTPATIENT)
Dept: GASTROENTEROLOGY | Facility: CLINIC | Age: 20
End: 2020-02-03

## 2020-02-03 ENCOUNTER — OFFICE VISIT (OUTPATIENT)
Dept: GASTROENTEROLOGY | Facility: CLINIC | Age: 20
End: 2020-02-03
Attending: INTERNAL MEDICINE
Payer: COMMERCIAL

## 2020-02-03 ENCOUNTER — PRE VISIT (OUTPATIENT)
Dept: NEPHROLOGY | Facility: CLINIC | Age: 20
End: 2020-02-03

## 2020-02-03 ENCOUNTER — OFFICE VISIT (OUTPATIENT)
Dept: NEPHROLOGY | Facility: CLINIC | Age: 20
End: 2020-02-03
Attending: PEDIATRICS
Payer: COMMERCIAL

## 2020-02-03 VITALS
HEART RATE: 99 BPM | RESPIRATION RATE: 18 BRPM | WEIGHT: 232.2 LBS | SYSTOLIC BLOOD PRESSURE: 111 MMHG | HEIGHT: 70 IN | TEMPERATURE: 97.5 F | DIASTOLIC BLOOD PRESSURE: 75 MMHG | OXYGEN SATURATION: 95 % | BODY MASS INDEX: 33.24 KG/M2

## 2020-02-03 DIAGNOSIS — N18.2 CKD (CHRONIC KIDNEY DISEASE), STAGE II: Primary | ICD-10-CM

## 2020-02-03 DIAGNOSIS — N18.2 CKD (CHRONIC KIDNEY DISEASE), STAGE II: ICD-10-CM

## 2020-02-03 DIAGNOSIS — Q44.1 CONGENITAL ANOMALY OF GALLBLADDER, BILE DUCTS, AND LIVER: ICD-10-CM

## 2020-02-03 DIAGNOSIS — I15.9 SECONDARY HYPERTENSION: ICD-10-CM

## 2020-02-03 DIAGNOSIS — Z13.220 SCREENING FOR HYPERLIPIDEMIA: Primary | ICD-10-CM

## 2020-02-03 DIAGNOSIS — Q61.19 CONGENITAL POLYCYSTIC KIDNEY, AUTOSOMAL RECESSIVE: ICD-10-CM

## 2020-02-03 DIAGNOSIS — Q44.70 CONGENITAL ANOMALY OF GALLBLADDER, BILE DUCTS, AND LIVER: ICD-10-CM

## 2020-02-03 DIAGNOSIS — Z13.220 SCREENING FOR HYPERLIPIDEMIA: ICD-10-CM

## 2020-02-03 DIAGNOSIS — Q44.5 CONGENITAL ANOMALY OF GALLBLADDER, BILE DUCTS, AND LIVER: ICD-10-CM

## 2020-02-03 LAB
ALBUMIN SERPL-MCNC: 4.3 G/DL (ref 3.4–5)
ALBUMIN UR-MCNC: 100 MG/DL
ALP SERPL-CCNC: 70 U/L (ref 40–150)
ALT SERPL W P-5'-P-CCNC: 31 U/L (ref 0–70)
ANION GAP SERPL CALCULATED.3IONS-SCNC: 7 MMOL/L (ref 3–14)
APPEARANCE UR: CLEAR
AST SERPL W P-5'-P-CCNC: 11 U/L (ref 0–45)
BILIRUB DIRECT SERPL-MCNC: 0.1 MG/DL (ref 0–0.2)
BILIRUB SERPL-MCNC: 0.8 MG/DL (ref 0.2–1.3)
BILIRUB UR QL STRIP: NEGATIVE
BUN SERPL-MCNC: 23 MG/DL (ref 7–30)
CALCIUM SERPL-MCNC: 10 MG/DL (ref 8.5–10.1)
CHLORIDE SERPL-SCNC: 105 MMOL/L (ref 94–109)
CHOLEST SERPL-MCNC: 252 MG/DL
CO2 SERPL-SCNC: 25 MMOL/L (ref 20–32)
COLOR UR AUTO: YELLOW
CREAT SERPL-MCNC: 1.64 MG/DL (ref 0.66–1.25)
CREAT UR-MCNC: 117 MG/DL
ERYTHROCYTE [DISTWIDTH] IN BLOOD BY AUTOMATED COUNT: 12.5 % (ref 10–15)
GFR SERPL CREATININE-BSD FRML MDRD: 59 ML/MIN/{1.73_M2}
GLUCOSE SERPL-MCNC: 99 MG/DL (ref 70–99)
GLUCOSE UR STRIP-MCNC: NEGATIVE MG/DL
HCT VFR BLD AUTO: 50.6 % (ref 40–53)
HDLC SERPL-MCNC: 34 MG/DL
HGB BLD-MCNC: 17.1 G/DL (ref 13.3–17.7)
HGB UR QL STRIP: NEGATIVE
INR PPP: 1.02 (ref 0.86–1.14)
KETONES UR STRIP-MCNC: NEGATIVE MG/DL
LDLC SERPL CALC-MCNC: 173 MG/DL
LEUKOCYTE ESTERASE UR QL STRIP: ABNORMAL
MCH RBC QN AUTO: 29.7 PG (ref 26.5–33)
MCHC RBC AUTO-ENTMCNC: 33.8 G/DL (ref 31.5–36.5)
MCV RBC AUTO: 88 FL (ref 78–100)
MUCOUS THREADS #/AREA URNS LPF: PRESENT /LPF
NITRATE UR QL: NEGATIVE
NONHDLC SERPL-MCNC: 218 MG/DL
PH UR STRIP: 6 PH (ref 5–7)
PHOSPHATE SERPL-MCNC: 2.8 MG/DL (ref 2.5–4.5)
PLATELET # BLD AUTO: 297 10E9/L (ref 150–450)
POTASSIUM SERPL-SCNC: 3.6 MMOL/L (ref 3.4–5.3)
PROT SERPL-MCNC: 8.6 G/DL (ref 6.8–8.8)
PROT UR-MCNC: 0.6 G/L
PROT/CREAT 24H UR: 0.51 G/G CR (ref 0–0.2)
RBC # BLD AUTO: 5.75 10E12/L (ref 4.4–5.9)
RBC #/AREA URNS AUTO: 1 /HPF (ref 0–2)
SODIUM SERPL-SCNC: 137 MMOL/L (ref 133–144)
SOURCE: ABNORMAL
SP GR UR STRIP: 1.01 (ref 1–1.03)
TRIGL SERPL-MCNC: 226 MG/DL
UROBILINOGEN UR STRIP-MCNC: 0 MG/DL (ref 0–2)
WBC # BLD AUTO: 6.5 10E9/L (ref 4–11)
WBC #/AREA URNS AUTO: 7 /HPF (ref 0–5)

## 2020-02-03 PROCEDURE — 82247 BILIRUBIN TOTAL: CPT | Performed by: INTERNAL MEDICINE

## 2020-02-03 PROCEDURE — 84460 ALANINE AMINO (ALT) (SGPT): CPT | Performed by: INTERNAL MEDICINE

## 2020-02-03 PROCEDURE — 81001 URINALYSIS AUTO W/SCOPE: CPT | Performed by: INTERNAL MEDICINE

## 2020-02-03 PROCEDURE — G0463 HOSPITAL OUTPT CLINIC VISIT: HCPCS | Mod: ZF

## 2020-02-03 PROCEDURE — 84450 TRANSFERASE (AST) (SGOT): CPT | Performed by: INTERNAL MEDICINE

## 2020-02-03 PROCEDURE — 80069 RENAL FUNCTION PANEL: CPT | Performed by: INTERNAL MEDICINE

## 2020-02-03 PROCEDURE — 85610 PROTHROMBIN TIME: CPT | Performed by: INTERNAL MEDICINE

## 2020-02-03 PROCEDURE — 84156 ASSAY OF PROTEIN URINE: CPT | Performed by: INTERNAL MEDICINE

## 2020-02-03 PROCEDURE — 80061 LIPID PANEL: CPT | Performed by: INTERNAL MEDICINE

## 2020-02-03 PROCEDURE — 82248 BILIRUBIN DIRECT: CPT | Performed by: INTERNAL MEDICINE

## 2020-02-03 PROCEDURE — 85027 COMPLETE CBC AUTOMATED: CPT | Performed by: INTERNAL MEDICINE

## 2020-02-03 PROCEDURE — 84155 ASSAY OF PROTEIN SERUM: CPT | Performed by: INTERNAL MEDICINE

## 2020-02-03 PROCEDURE — 36415 COLL VENOUS BLD VENIPUNCTURE: CPT | Performed by: INTERNAL MEDICINE

## 2020-02-03 PROCEDURE — G0463 HOSPITAL OUTPT CLINIC VISIT: HCPCS | Mod: 27

## 2020-02-03 PROCEDURE — 84075 ASSAY ALKALINE PHOSPHATASE: CPT | Performed by: INTERNAL MEDICINE

## 2020-02-03 SDOH — HEALTH STABILITY: MENTAL HEALTH: HOW OFTEN DO YOU HAVE A DRINK CONTAINING ALCOHOL?: 2-4 TIMES A MONTH

## 2020-02-03 SDOH — HEALTH STABILITY: MENTAL HEALTH: HOW OFTEN DO YOU HAVE 6 OR MORE DRINKS ON ONE OCCASION?: LESS THAN MONTHLY

## 2020-02-03 ASSESSMENT — PAIN SCALES - GENERAL
PAINLEVEL: NO PAIN (0)
PAINLEVEL: NO PAIN (0)

## 2020-02-03 ASSESSMENT — MIFFLIN-ST. JEOR: SCORE: 2064.5

## 2020-02-03 NOTE — PROGRESS NOTES
Nephrology Clinic    Jayla Chapin MD  2020     Name: Solomon Wilcox  MRN: [id number]  Age: 19 year old  : 2000  Referring provider: Taina Ni     Assessment and Plan:  #CKD stage II: Due to ARPKD. Creatinine has been very stable over the past few years. Current creatinine of 1.64. GFR of 59. Mild proteinuria. No hematuria. Likely increase in creatinine due to frequent weight training.   --Obtain labs in 3 months    #Hypertension: Blood pressures are under good control on lisinopril and HCTZ. Goal blood pressures are <120/80.  --Will consider increasing lisinopril in the future.    #Hepatic fibrosis: Liver function is stable. Following with Dr. Goss    Follow-up: Return in about 6 months (around 8/3/2020).     Reason For Visit:   Transfer of service    HPI:   Solomon Wilcox is a 19 year old male with a history of ARPKD, CKD stage II, hypertension who presents with his mother for transfer of service. He was first diagnosed with ARPKD around the time of his birth. Both the mother and father are carriers of the disease. His mother notes her pregnancy with the patient was complicated with an emergency  and a pneumothorax  Liver cysts were discovered at the age of 1. Since then, his kidney function has been around 70% and has remained stable. Patient was very active with sports in high school. He was last evaluated by Dr. Ni in pediatric nephrology on 17 at which time he reported no new kidney concerns. Since then he has been doing well with no hospitalizations and no surgeries. He is not having abdominal or flank pain, headaches, gross hematuria. His energy is good. Patient typically exercises 4-5 times per week, mostly weight training. He is not taking any supplements. No history of kidney stones. No other health concerns. Patient has a history of gallbladder removal and had an averse reaction to the anesthetic. He currently attends college at [employer] and is  studying environmental studies.        Review of Systems:   Pertinent items are noted in HPI or as below, remainder of complete ROS is negative.      Active Medications:     Current Outpatient Medications:      cholecalciferol (VITAMIN  -D) 1000 UNITS capsule, Take 1 capsule by mouth daily, Disp: , Rfl:      cyanocolbalamin (VITAMIN  B-12) 500 MCG tablet, Take 500 mcg by mouth daily, Disp: , Rfl:      hydrochlorothiazide (HYDRODIURIL) 25 MG tablet, Take 1 tablet (25 mg) by mouth 2 times daily, Disp: 180 tablet, Rfl: 1     lisinopril (PRINIVIL/ZESTRIL) 10 MG tablet, Take 1 tablet (10 mg) by mouth daily, Disp: 90 tablet, Rfl: 3     ursodiol (ACTIGALL) 300 MG capsule, Take 1 capsule (300 mg) by mouth 2 times daily, Disp: 180 capsule, Rfl: 3     adapalene (DIFFERIN) 0.1 % cream, Apply topically At Bedtime Apply as directed, Disp: , Rfl:      citalopram (CELEXA) 40 MG tablet, Take 1 tablet by mouth daily., Disp: , Rfl:      guanFACINE HCl (INTUNIV) 3 MG TB24, Take 1 tablet by mouth daily., Disp: , Rfl:      risperiDONE (RISPERDAL) 0.5 MG tablet, Take 0.5 mg by mouth 2 times daily, Disp: , Rfl:      Allergies:   The patient reports no known allergies.     Past Medical History:  Autosomal recessive polycystic kidney disease   CKD (chronic kidney disease), stage II   Drug reaction   Liver disease, cystic, congenital   Short stature   Unspecified hypertensive kidney disease with chronic kidney disease stage I through stage IV, or unspecified(403.90)      Past Surgical History:  Laparoscopic cholecystectomy; 10/24/14  Nose surgery    Family History:   Father: Hypertension, lipids  Paternal grandfather: Myocardial infarction      Social History:   No marijuana use  Occasional alcohol use  Occasional marijuana use  Studies environmental studies at [employer]    Physical Exam:  /75 (BP Location: Left arm, Patient Position: Sitting, Cuff Size: Adult Large)   Pulse 99   Temp 97.5  F (36.4  C) (Oral)   Resp 18    "Ht 1.778 m (5' 10\")   Wt 105.3 kg (232 lb 3.2 oz)   SpO2 95%   BMI 33.32 kg/m     GENERAL APPEARANCE: alert and no distress  EYES: no scleral icterus, pupils equal  HENT: NC/AT,  mouth  without ulcers or lesions  Endocrine: no goiter, no moon facies  Pulmonary: Breathing comfortably.   CV: No edema and well perfused  MS: no evidence of inflammation in joints, no muscle tenderness  SKIN: no rash, warm, dry, no cyanosis  NEURO: mentation intact and speech normal      Laboratory:  CMP  Recent Labs   Lab Test 02/03/20  1003 07/25/19  0902 02/01/19  1012 08/23/18  0840  10/25/14  0407 10/24/14  2225 10/24/14  1235    138 139 139   < >  --  135 141   POTASSIUM 3.6 4.2 4.3 4.0   < >  --  3.8 4.0   CHLORIDE 105 105 104 103   < >  --  101 107   CO2 25 29 28 28   < >  --  26 28   ANIONGAP 7 4 8 7   < >  --  8 6   GLC 99 89 99 91   < >  --  118* 89   BUN 23 28 16 28   < >  --  18 20   CR 1.64* 1.47* 1.46* 1.51*   < >  --  1.26* 1.20*   GFRESTIMATED 59* 67 68 59*   < >  --  76 80   GFRESTBLACK 68 78 79 71   < >  --  >90   GFR Calc   >90   GFR Calc     PITO 10.0 9.5 9.5 9.8   < >  --  8.9* 9.3   MAG  --   --   --   --   --  2.0 1.2* 1.4*   PHOS 2.8 3.6 3.1 3.1   < >  --  3.4  --    PROTTOTAL 8.6 8.2 8.3 8.6   < >  --   --   --    ALBUMIN 4.3 3.9 4.1 4.4   < >  --  3.9  --    BILITOTAL 0.8 0.3 0.7 0.6   < >  --   --   --    ALKPHOS 70 78 88 74   < >  --   --   --    AST 11 26 35 19   < >  --   --   --    ALT 31 63 93* 32   < >  --   --   --     < > = values in this interval not displayed.     CBC  Recent Labs   Lab Test 02/03/20  1003 07/25/19  0902 02/01/19  1012 08/23/18  0840   HGB 17.1 15.1 16.2 14.9   WBC 6.5 4.7 7.4 7.0   RBC 5.75 5.32 5.40 5.01   HCT 50.6 48.0 48.7 44.2   MCV 88 90 90 88   MCH 29.7 28.4 30.0 29.7   MCHC 33.8 31.5 33.3 33.7   RDW 12.5 13.2 13.2 12.5    261 271 251     INR  Recent Labs   Lab Test 02/03/20  1003 08/31/15  1030 08/26/14  0955 08/27/13  0940   INR " 1.02 1.03 0.99 1.02   PTT  --  28  --   --      ABG  No lab results found.   URINE STUDIES  Recent Labs   Lab Test 02/03/20  1020 07/25/19  0902 02/06/14  1515 02/14/13  0825   COLOR Yellow Light Yellow Straw Light Yellow   APPEARANCE Clear Clear Clear Clear   URINEGLC Negative Negative Negative Negative   URINEBILI Negative Negative Negative Negative   URINEKETONE Negative Negative Negative Negative   SG 1.011 1.010 1.008 1.008   UBLD Negative Negative Negative Negative   URINEPH 6.0 6.5 6.0 5.5   PROTEIN 100* Negative Negative Negative   NITRITE Negative Negative Negative Negative   LEUKEST Small* Negative Negative Negative   RBCU 1 <1 <1 <1   WBCU 7* 1 <1 <1     Recent Labs   Lab Test 02/03/20  1020 02/06/14  1515 02/14/13  0825   UTPG 0.51* 0.10 0.13     PTH  Recent Labs   Lab Test 07/25/19  0902 02/01/19  1012 08/23/18  0840 12/22/17  1207 08/10/17  0850 12/22/16  1410 08/02/16  1115 02/11/16  0809 02/19/15  1600 02/14/13  0825   PTHI 7* 35 10* <3* 8* <3* 19 36 54 41     IRON STUDIES   Recent Labs   Lab Test 07/25/19  0902 02/01/19  1012 12/22/17  1207   ZEE 82 47 13*       Imaging:   US Abdomen complete with doppler (7/25/19):  1. Intrahepatic ductal dilatation, stable from prior exam. Doppler evaluation as normal.  2. Polycystic kidney disease.    Per radiology.      Scribe Disclosure:   I, Arnaldo Nieves, am serving as a scribe to document services personally performed by Jayla Chapin MD at this visit, based upon the provider's statements to me. All documentation has been reviewed by the aforementioned provider prior to being entered into the official medical record.

## 2020-02-03 NOTE — LETTER
2/3/2020       RE: Solomon Wilcox  [address] [facility] [address]     Dear Colleague,    Thank you for referring your patient, Solomon Wilcox, to the [facility] NEPHROLOGY at [facility]. Please see a copy of my visit note below.      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 Creatinine has been very stable over the past few years. Current creatinine of 1.64. GFR of 59. Mild proteinuria. No hematuria. Likely increase in creatinine due to frequent weight training.   --Obtain labs in 3 months    #Hypertension: Blood pressures are under good control on lisinopril and HCTZ. Goal blood pressures are <120/80.  --Will consider increasing lisinopril in the future.    #Hepatic fibrosis: Liver function is stable. Following with Dr. Goss    Follow-up: Return in about 6 months (around 8/3/2020).     Reason For Visit:   Transfer of service    HPI:   Solomon Wilcox is a 19 year old male with a history of ARPKD, CKD stage II, hypertension who presents with his mother for transfer of service. He was first diagnosed with ARPKD around the time of his birth. Both the mother and father are carriers of the disease. His mother notes her pregnancy with the patient was complicated with an emergency  and a pneumothorax  Liver cysts were discovered at the age of 1. Since then, his kidney function has been around 70% and has remained stable. Patient was very active with sports in high school. He was last evaluated by Dr. Ni in pediatric nephrology on 19 at which time he reported no new kidney concerns. Since then he has been doing well with no hospitalizations and no surgeries. He is not having abdominal or flank pain, headaches, gross hematuria. His energy is good. Patient typically exercises  4-5 times per week, mostly weight training. He is not taking any supplements. No history of kidney stones. No other health concerns. Patient has a history of gallbladder removal and had an averse reaction to the anesthetic. He currently attends college at [employer] and is studying environmental studies.        Review of Systems:   Pertinent items are noted in HPI or as below, remainder of complete ROS is negative.      Active Medications:     Current Outpatient Medications:      cholecalciferol (VITAMIN  -D) 1000 UNITS capsule, Take 1 capsule by mouth daily, Disp: , Rfl:      cyanocolbalamin (VITAMIN  B-12) 500 MCG tablet, Take 500 mcg by mouth daily, Disp: , Rfl:      hydrochlorothiazide (HYDRODIURIL) 25 MG tablet, Take 1 tablet (25 mg) by mouth 2 times daily, Disp: 180 tablet, Rfl: 1     lisinopril (PRINIVIL/ZESTRIL) 10 MG tablet, Take 1 tablet (10 mg) by mouth daily, Disp: 90 tablet, Rfl: 3     ursodiol (ACTIGALL) 300 MG capsule, Take 1 capsule (300 mg) by mouth 2 times daily, Disp: 180 capsule, Rfl: 3     adapalene (DIFFERIN) 0.1 % cream, Apply topically At Bedtime Apply as directed, Disp: , Rfl:      citalopram (CELEXA) 40 MG tablet, Take 1 tablet by mouth daily., Disp: , Rfl:      guanFACINE HCl (INTUNIV) 3 MG TB24, Take 1 tablet by mouth daily., Disp: , Rfl:      risperiDONE (RISPERDAL) 0.5 MG tablet, Take 0.5 mg by mouth 2 times daily, Disp: , Rfl:      Allergies:   The patient reports no known allergies.     Past Medical History:  Autosomal recessive polycystic kidney disease   CKD (chronic kidney disease), stage II   Drug reaction   Liver disease, cystic, congenital   Short stature   Unspecified hypertensive kidney disease with chronic kidney disease stage I through stage IV, or unspecified(403.90)      Past Surgical History:  Laparoscopic cholecystectomy; 10/24/14  Nose surgery    Family History:   Father: Hypertension, lipids  Paternal grandfather: Myocardial infarction      Social History:  "  No marijuana use  Occasional alcohol use  Occasional marijuana use  Studies environmental studies at [employer]    Physical Exam:  /75 (BP Location: Left arm, Patient Position: Sitting, Cuff Size: Adult Large)   Pulse 99   Temp 97.5  F (36.4  C) (Oral)   Resp 18   Ht 1.778 m (5' 10\")   Wt 105.3 kg (232 lb 3.2 oz)   SpO2 95%   BMI 33.32 kg/m      GENERAL APPEARANCE: alert and no distress  EYES: no scleral icterus, pupils equal  HENT: NC/AT,  mouth  without ulcers or lesions  Endocrine: no goiter, no moon facies  Pulmonary: Breathing comfortably.   CV: No edema and well perfused  MS: no evidence of inflammation in joints, no muscle tenderness  SKIN: no rash, warm, dry, no cyanosis  NEURO: mentation intact and speech normal      Laboratory:  CMP  Recent Labs   Lab Test 02/03/20  1003 07/25/19  0902 02/01/19  1012 08/23/18  0840  10/25/14  0407 10/24/14  2225 10/24/14  1235    138 139 139   < >  --  135 141   POTASSIUM 3.6 4.2 4.3 4.0   < >  --  3.8 4.0   CHLORIDE 105 105 104 103   < >  --  101 107   CO2 25 29 28 28   < >  --  26 28   ANIONGAP 7 4 8 7   < >  --  8 6   GLC 99 89 99 91   < >  --  118* 89   BUN 23 28 16 28   < >  --  18 20   CR 1.64* 1.47* 1.46* 1.51*   < >  --  1.26* 1.20*   GFRESTIMATED 59* 67 68 59*   < >  --  76 80   GFRESTBLACK 68 78 79 71   < >  --  >90   GFR Calc   >90   GFR Calc     PITO 10.0 9.5 9.5 9.8   < >  --  8.9* 9.3   MAG  --   --   --   --   --  2.0 1.2* 1.4*   PHOS 2.8 3.6 3.1 3.1   < >  --  3.4  --    PROTTOTAL 8.6 8.2 8.3 8.6   < >  --   --   --    ALBUMIN 4.3 3.9 4.1 4.4   < >  --  3.9  --    BILITOTAL 0.8 0.3 0.7 0.6   < >  --   --   --    ALKPHOS 70 78 88 74   < >  --   --   --    AST 11 26 35 19   < >  --   --   --    ALT 31 63 93* 32   < >  --   --   --     < > = values in this interval not displayed.     CBC  Recent Labs   Lab Test 02/03/20  1003 07/25/19  0902 02/01/19  1012 08/23/18  0840   HGB 17.1 15.1 16.2 14.9 "   WBC 6.5 4.7 7.4 7.0   RBC 5.75 5.32 5.40 5.01   HCT 50.6 48.0 48.7 44.2   MCV 88 90 90 88   MCH 29.7 28.4 30.0 29.7   MCHC 33.8 31.5 33.3 33.7   RDW 12.5 13.2 13.2 12.5    261 271 251     INR  Recent Labs   Lab Test 02/03/20  1003 08/31/15  1030 08/26/14  0955 08/27/13  0940   INR 1.02 1.03 0.99 1.02   PTT  --  28  --   --      ABG  No lab results found.   URINE STUDIES  Recent Labs   Lab Test 02/03/20  1020 07/25/19  0902 02/06/14  1515 02/14/13  0825   COLOR Yellow Light Yellow Straw Light Yellow   APPEARANCE Clear Clear Clear Clear   URINEGLC Negative Negative Negative Negative   URINEBILI Negative Negative Negative Negative   URINEKETONE Negative Negative Negative Negative   SG 1.011 1.010 1.008 1.008   UBLD Negative Negative Negative Negative   URINEPH 6.0 6.5 6.0 5.5   PROTEIN 100* Negative Negative Negative   NITRITE Negative Negative Negative Negative   LEUKEST Small* Negative Negative Negative   RBCU 1 <1 <1 <1   WBCU 7* 1 <1 <1     Recent Labs   Lab Test 02/03/20  1020 02/06/14  1515 02/14/13  0825   UTPG 0.51* 0.10 0.13     PTH  Recent Labs   Lab Test 07/25/19  0902 02/01/19  1012 08/23/18  0840 12/22/17  1207 08/10/17  0850 12/22/16  1410 08/02/16  1115 02/11/16  0809 02/19/15  1600 02/14/13  0825   PTHI 7* 35 10* <3* 8* <3* 19 36 54 41     IRON STUDIES   Recent Labs   Lab Test 07/25/19  0902 02/01/19  1012 12/22/17  1207   ZEE 82 47 13*       Imaging:   US Abdomen complete with doppler (7/25/19):  1. Intrahepatic ductal dilatation, stable from prior exam. Doppler evaluation as normal.  2. Polycystic kidney disease.    Per radiology.      Scribe Disclosure:   I, Arnaldo Nieves, am serving as a scribe to document services personally performed by Jayla Chapin MD at this visit, based upon the provider's statements to me. All documentation has been reviewed by the aforementioned provider prior to being entered into the official medical record.    Again, thank you for allowing me to participate in  the care of your patient.      Sincerely,    Jayla Chapin MD

## 2020-02-03 NOTE — NURSING NOTE
"Chief Complaint   Patient presents with     Consult     congenital polycystic kidney       Vital signs:  Temp: 97.5  F (36.4  C) Temp src: Oral BP: 111/75 Pulse: 99   Resp: 18 SpO2: 95 %     Height: 177.8 cm (5' 10\") Weight: 105.3 kg (232 lb 3.2 oz)  Estimated body mass index is 33.32 kg/m  as calculated from the following:    Height as of this encounter: 1.778 m (5' 10\").    Weight as of this encounter: 105.3 kg (232 lb 3.2 oz).          Nikki Bronson, LTAC, located within [facility]  2/3/2020 10:53 AM        "

## 2020-02-03 NOTE — PATIENT INSTRUCTIONS
1. Lets recheck your blood work and urine tests in 3 months again   2. If you have questions, please call Cassi Solo- Nurse coordinator at [phone]

## 2020-02-03 NOTE — LETTER
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 Creatinine has been very stable over the past few years. Current creatinine of 1.64. GFR of 59. Mild proteinuria. No hematuria. Likely increase in creatinine due to frequent weight training.   --Obtain labs in 3 months    #Hypertension: Blood pressures are under good control on lisinopril and HCTZ. Goal blood pressures are <120/80.  --Will consider increasing lisinopril in the future.    #Hepatic fibrosis: Liver function is stable. Following with Dr. Goss    Follow-up: Return in about 6 months (around 8/3/2020).     Reason For Visit:   Transfer of service    HPI:   Solomon Wilcox is a 19 year old male with a history of ARPKD, CKD stage II, hypertension who presents with his mother for transfer of service. He was first diagnosed with ARPKD around the time of his birth. Both the mother and father are carriers of the disease. His mother notes her pregnancy with the patient was complicated with an emergency  and a pneumothorax  Liver cysts were discovered at the age of 1. Since then, his kidney function has been around 70% and has remained stable. Patient was very active with sports in high school. He was last evaluated by Dr. Ni in pediatric nephrology on 19 at which time he reported no new kidney concerns. Since then he has been doing well with no hospitalizations and no surgeries. He is not having abdominal or flank pain, headaches, gross hematuria. His energy is good. Patient typically exercises 4-5 times per week, mostly weight training. He is not taking any supplements. No history of kidney stones. No other health concerns. Patient has a history of gallbladder removal and had an averse  reaction to the anesthetic. He currently attends college at [employer] and is studying environmental studies.        Review of Systems:   Pertinent items are noted in HPI or as below, remainder of complete ROS is negative.      Active Medications:     Current Outpatient Medications:      cholecalciferol (VITAMIN  -D) 1000 UNITS capsule, Take 1 capsule by mouth daily, Disp: , Rfl:      cyanocolbalamin (VITAMIN  B-12) 500 MCG tablet, Take 500 mcg by mouth daily, Disp: , Rfl:      hydrochlorothiazide (HYDRODIURIL) 25 MG tablet, Take 1 tablet (25 mg) by mouth 2 times daily, Disp: 180 tablet, Rfl: 1     lisinopril (PRINIVIL/ZESTRIL) 10 MG tablet, Take 1 tablet (10 mg) by mouth daily, Disp: 90 tablet, Rfl: 3     ursodiol (ACTIGALL) 300 MG capsule, Take 1 capsule (300 mg) by mouth 2 times daily, Disp: 180 capsule, Rfl: 3     adapalene (DIFFERIN) 0.1 % cream, Apply topically At Bedtime Apply as directed, Disp: , Rfl:      citalopram (CELEXA) 40 MG tablet, Take 1 tablet by mouth daily., Disp: , Rfl:      guanFACINE HCl (INTUNIV) 3 MG TB24, Take 1 tablet by mouth daily., Disp: , Rfl:      risperiDONE (RISPERDAL) 0.5 MG tablet, Take 0.5 mg by mouth 2 times daily, Disp: , Rfl:      Allergies:   The patient reports no known allergies.     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   CV: No edema and well perfused  MS: no evidence of inflammation in joints, no muscle tenderness  SKIN: no rash, warm, dry, no cyanosis  NEURO: mentation intact and speech normal      Laboratory:  CMP  Recent Labs   Lab Test 02/03/20  1003 07/25/19  0902 02/01/19  1012 08/23/18  0840  10/25/14  0407 10/24/14  2225 10/24/14  1235    138 139 139   < >  --  135 141   POTASSIUM 3.6 4.2 4.3 4.0   < >  --  3.8 4.0   CHLORIDE 105 105 104 103   < >  --  101 107   CO2 25 29 28 28   < >  --  26 28   ANIONGAP 7 4 8 7   < >  --  8 6   GLC 99 89 99 91   < >  --  118* 89   BUN 23 28 16 28   < >  --  18 20   CR 1.64* 1.47* 1.46* 1.51*   < >  --  1.26* 1.20*   GFRESTIMATED 59* 67 68 59*   < >  --  76 80   GFRESTBLACK 68 78 79 71   < >  --  >90   GFR Calc   >90   GFR Calc     PITO 10.0 9.5 9.5 9.8   < >  --  8.9* 9.3   MAG  --   --   --   --   --  2.0 1.2* 1.4*   PHOS 2.8 3.6 3.1 3.1   < >  --  3.4  --    PROTTOTAL 8.6 8.2 8.3 8.6   < >  --   --   --    ALBUMIN 4.3 3.9 4.1 4.4   < >  --  3.9  --    BILITOTAL 0.8 0.3 0.7 0.6   < >  --   --   --    ALKPHOS 70 78 88 74   < >  --   --   --    AST 11 26 35 19   < >  --   --   --    ALT 31 63 93* 32   < >  --   --   --     < > = values in this interval not displayed.     CBC  Recent Labs   Lab Test 02/03/20  1003 07/25/19  0902 02/01/19  1012 08/23/18  0840   HGB 17.1 15.1 16.2 14.9   WBC 6.5 4.7 7.4 7.0   RBC 5.75 5.32 5.40 5.01   HCT 50.6 48.0 48.7 44.2   MCV 88 90 90 88   MCH 29.7 28.4 30.0 29.7   MCHC 33.8 31.5 33.3 33.7   RDW 12.5 13.2 13.2 12.5    261 271 251 "     INR  Recent Labs   Lab Test 02/03/20  1003 08/31/15  1030 08/26/14  0955 08/27/13  0940   INR 1.02 1.03 0.99 1.02   PTT  --  28  --   --      ABG  No lab results found.   URINE STUDIES  Recent Labs   Lab Test 02/03/20  1020 07/25/19  0902 02/06/14  1515 02/14/13  0825   COLOR Yellow Light Yellow Straw Light Yellow   APPEARANCE Clear Clear Clear Clear   URINEGLC Negative Negative Negative Negative   URINEBILI Negative Negative Negative Negative   URINEKETONE Negative Negative Negative Negative   SG 1.011 1.010 1.008 1.008   UBLD Negative Negative Negative Negative   URINEPH 6.0 6.5 6.0 5.5   PROTEIN 100* Negative Negative Negative   NITRITE Negative Negative Negative Negative   LEUKEST Small* Negative Negative Negative   RBCU 1 <1 <1 <1   WBCU 7* 1 <1 <1     Recent Labs   Lab Test 02/03/20  1020 02/06/14  1515 02/14/13  0825   UTPG 0.51* 0.10 0.13     PTH  Recent Labs   Lab Test 07/25/19  0902 02/01/19  1012 08/23/18  0840 12/22/17  1207 08/10/17  0850 12/22/16  1410 08/02/16  1115 02/11/16  0809 02/19/15  1600 02/14/13  0825   PTHI 7* 35 10* <3* 8* <3* 19 36 54 41     IRON STUDIES   Recent Labs   Lab Test 07/25/19  0902 02/01/19  1012 12/22/17  1207   ZEE 82 47 13*       Imaging:   US Abdomen complete with doppler (7/25/19):  1. Intrahepatic ductal dilatation, stable from prior exam. Doppler evaluation as normal.  2. Polycystic kidney disease.    Per radiology.      Scribe Disclosure:   I, Arnaldo Nieves, am serving as a scribe to document services personally performed by Jayla Chapin MD at this visit, based upon the provider's statements to me. All documentation has been reviewed by the aforementioned provider prior to being entered into the official medical record.    Jayla Chapin MD

## 2020-02-03 NOTE — PROGRESS NOTES
HISTORY OF PRESENT ILLNESS:  I had the pleasure of seeing Solomon Wilcox for consultation in the Liver Clinic at the [facility] on 02/03/2020.  Mr. Wilcox presents for consultation regarding Caroli disease with congenital hepatic fibrosis.      He was diagnosed with both autosomal recessive polycystic kidney disease and congenital hepatic fibrosis beginning at the age 1.  He has done remarkably well since that time.  He still has not required kidney transplantation or hemodialysis.  He has had no complications of his congenital hepatic fibrosis.  In fact, he has essentially no evidence of any portal hypertension.      He denies any abdominal pain, itching or skin rash or fatigue.  He denies any increased abdominal girth or lower extremity edema.  He denies any fevers or chills, cough or shortness of breath.  He denies any nausea, vomiting, diarrhea or constipation.  His appetite has been good.  His weight has largely remained the same.      PAST SURGICAL HISTORY:  Significant for a cyst removed from his face.  He also had a cholecystectomy when he was in 10th grade.  His only other medical problem is high blood pressure.      SOCIAL HISTORY:  He does drink alcohol perhaps once a week.  He does smoke some marijuana but no cigarettes.      FAMILY HISTORY:  Positive for hypertension and hyperlipidemia in his father.  There is no other family history of polycystic kidney disease or liver disease.      REVIEW OF SYSTEMS:  A complete review of systems was negative other than that noted above.     Current Outpatient Medications   Medication     cholecalciferol (VITAMIN  -D) 1000 UNITS capsule     cyanocolbalamin (VITAMIN  B-12) 500 MCG tablet     hydrochlorothiazide (HYDRODIURIL) 25 MG tablet     lisinopril (PRINIVIL/ZESTRIL) 10 MG tablet     ursodiol (ACTIGALL) 300 MG capsule     adapalene (DIFFERIN) 0.1 % cream     citalopram (CELEXA) 40 MG tablet     guanFACINE HCl (INTUNIV) 3 MG TB24      risperiDONE (RISPERDAL) 0.5 MG tablet     No current facility-administered medications for this visit.      There were no vitals taken for this visit.    PHYSICAL EXAMINATION:  His weight is 232 pounds.  His blood pressure is 111/75, temperature is 97.5, pulse rate is 99.  In general, he looks quite well.  HEENT exam shows no scleral icterus or temporal muscle wasting.  Chest is clear.  Abdominal exam shows no increase in girth.  No masses or tenderness to palpation are present.  His liver is 10 cm in span without left lobe enlargement.  No spleen tip is palpable.  Cardiac exam reveals no S3, S4 or murmur.  Extremity exam shows no edema.  Skin exam shows no stigmata of chronic liver disease.  Neurologic exam shows no asterixis.     Recent Results (from the past 168 hour(s))   CBC with platelets    Collection Time: 02/03/20 10:03 AM   Result Value Ref Range    WBC 6.5 4.0 - 11.0 10e9/L    RBC Count 5.75 4.4 - 5.9 10e12/L    Hemoglobin 17.1 13.3 - 17.7 g/dL    Hematocrit 50.6 40.0 - 53.0 %    MCV 88 78 - 100 fl    MCH 29.7 26.5 - 33.0 pg    MCHC 33.8 31.5 - 36.5 g/dL    RDW 12.5 10.0 - 15.0 %    Platelet Count 297 150 - 450 10e9/L   Renal panel    Collection Time: 02/03/20 10:03 AM   Result Value Ref Range    Sodium 137 133 - 144 mmol/L    Potassium 3.6 3.4 - 5.3 mmol/L    Chloride 105 94 - 109 mmol/L    Carbon Dioxide 25 20 - 32 mmol/L    Anion Gap 7 3 - 14 mmol/L    Glucose 99 70 - 99 mg/dL    Urea Nitrogen 23 7 - 30 mg/dL    Creatinine 1.64 (H) 0.66 - 1.25 mg/dL    GFR Estimate 59 (L) >60 mL/min/[1.73_m2]    GFR Estimate If Black 68 >60 mL/min/[1.73_m2]    Calcium 10.0 8.5 - 10.1 mg/dL    Phosphorus 2.8 2.5 - 4.5 mg/dL    Albumin 4.3 3.4 - 5.0 g/dL   INR    Collection Time: 02/03/20 10:03 AM   Result Value Ref Range    INR 1.02 0.86 - 1.14   Alkaline phosphatase    Collection Time: 02/03/20 10:03 AM   Result Value Ref Range    Alkaline Phosphatase 70 40 - 150 U/L   ALT    Collection Time: 02/03/20 10:03 AM   Result  Value Ref Range    ALT 31 0 - 70 U/L   AST    Collection Time: 02/03/20 10:03 AM   Result Value Ref Range    AST 11 0 - 45 U/L   Bilirubin  total    Collection Time: 02/03/20 10:03 AM   Result Value Ref Range    Bilirubin Total 0.8 0.2 - 1.3 mg/dL   Bilirubin direct    Collection Time: 02/03/20 10:03 AM   Result Value Ref Range    Bilirubin Direct 0.1 0.0 - 0.2 mg/dL   Protein total    Collection Time: 02/03/20 10:03 AM   Result Value Ref Range    Protein Total 8.6 6.8 - 8.8 g/dL   Routine UA with microscopic - No culture (for [facility])    Collection Time: 02/03/20 10:20 AM   Result Value Ref Range    Color Urine Yellow     Appearance Urine Clear     Glucose Urine Negative NEG^Negative mg/dL    Bilirubin Urine Negative NEG^Negative    Ketones Urine Negative NEG^Negative mg/dL    Specific Gravity Urine 1.011 1.003 - 1.035    Blood Urine Negative NEG^Negative    pH Urine 6.0 5.0 - 7.0 pH    Protein Albumin Urine 100 (A) NEG^Negative mg/dL    Urobilinogen mg/dL 0.0 0.0 - 2.0 mg/dL    Nitrite Urine Negative NEG^Negative    Leukocyte Esterase Urine Small (A) NEG^Negative    Source Midstream Urine     WBC Urine 7 (H) 0 - 5 /HPF    RBC Urine 1 0 - 2 /HPF    Mucous Urine Present (A) NEG^Negative /LPF   Protein  random urine with Creat Ratio    Collection Time: 02/03/20 10:20 AM   Result Value Ref Range    Protein Random Urine 0.60 g/L    Protein Total Urine g/gr Creatinine 0.51 (H) 0 - 0.2 g/g Cr   Creatinine urine calculation only    Collection Time: 02/03/20 10:20 AM   Result Value Ref Range    Creatinine Urine 117 mg/dL      IMPRESSION:  My impression is that 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  He has several cysts that are fairly good size in his liver.  His liver function is completely normal and there is no evidence of portal hypertension.  Specifically, he has a normal spleen size and normal platelet count, and antegrade flow through the portal vein.  I, thus, will not do any variceal  screening at this point in time.  I will just see him once a year.      He was getting imaging of the liver once a year and I am not altogether sure why.  Perhaps in 5 years I will repeat an MRCP to make sure there is no significant change to the cysts.       Thank you very much for allowing me to participate in the care of this patient.  If you have any questions regarding my recommendations, please do not hesitate to contact me.       Lan Goss MD      Professor of Medicine  [facility] [employer] [employer]      Executive Medical Director, Solid Organ Transplant Program  [facility]

## 2020-02-03 NOTE — LETTER
2/3/2020      RE: Solomon Wilcox  [address]  [facility] [address]       HISTORY OF PRESENT ILLNESS:  I had the pleasure of seeing Solomon Wilcox for consultation in the Liver Clinic at the [facility] on 02/03/2020.  Mr. Wilcox presents for consultation regarding Caroli disease with congenital hepatic fibrosis.      He was diagnosed with both autosomal recessive polycystic kidney disease and congenital hepatic fibrosis beginning at the age 1.  He has done remarkably well since that time.  He still has not required kidney transplantation or hemodialysis.  He has had no complications of his congenital hepatic fibrosis.  In fact, he has essentially no evidence of any portal hypertension.      He denies any abdominal pain, itching or skin rash or fatigue.  He denies any increased abdominal girth or lower extremity edema.  He denies any fevers or chills, cough or shortness of breath.  He denies any nausea, vomiting, diarrhea or constipation.  His appetite has been good.  His weight has largely remained the same.      PAST SURGICAL HISTORY:  Significant for a cyst removed from his face.  He also had a cholecystectomy when he was in 10th grade.  His only other medical problem is high blood pressure.      SOCIAL HISTORY:  He does drink alcohol perhaps once a week.  He does smoke some marijuana but no cigarettes.      FAMILY HISTORY:  Positive for hypertension and hyperlipidemia in his father.  There is no other family history of polycystic kidney disease or liver disease.      REVIEW OF SYSTEMS:  A complete review of systems was negative other than that noted above.     Current Outpatient Medications   Medication     cholecalciferol (VITAMIN  -D) 1000 UNITS capsule     cyanocolbalamin (VITAMIN  B-12) 500 MCG tablet     hydrochlorothiazide (HYDRODIURIL) 25 MG tablet     lisinopril (PRINIVIL/ZESTRIL) 10 MG tablet     ursodiol (ACTIGALL) 300 MG capsule     adapalene (DIFFERIN) 0.1 %  cream     citalopram (CELEXA) 40 MG tablet     guanFACINE HCl (INTUNIV) 3 MG TB24     risperiDONE (RISPERDAL) 0.5 MG tablet     No current facility-administered medications for this visit.      There were no vitals taken for this visit.    PHYSICAL EXAMINATION:  His weight is 232 pounds.  His blood pressure is 111/75, temperature is 97.5, pulse rate is 99.  In general, he looks quite well.  HEENT exam shows no scleral icterus or temporal muscle wasting.  Chest is clear.  Abdominal exam shows no increase in girth.  No masses or tenderness to palpation are present.  His liver is 10 cm in span without left lobe enlargement.  No spleen tip is palpable.  Cardiac exam reveals no S3, S4 or murmur.  Extremity exam shows no edema.  Skin exam shows no stigmata of chronic liver disease.  Neurologic exam shows no asterixis.     Recent Results (from the past 168 hour(s))   CBC with platelets    Collection Time: 02/03/20 10:03 AM   Result Value Ref Range    WBC 6.5 4.0 - 11.0 10e9/L    RBC Count 5.75 4.4 - 5.9 10e12/L    Hemoglobin 17.1 13.3 - 17.7 g/dL    Hematocrit 50.6 40.0 - 53.0 %    MCV 88 78 - 100 fl    MCH 29.7 26.5 - 33.0 pg    MCHC 33.8 31.5 - 36.5 g/dL    RDW 12.5 10.0 - 15.0 %    Platelet Count 297 150 - 450 10e9/L   Renal panel    Collection Time: 02/03/20 10:03 AM   Result Value Ref Range    Sodium 137 133 - 144 mmol/L    Potassium 3.6 3.4 - 5.3 mmol/L    Chloride 105 94 - 109 mmol/L    Carbon Dioxide 25 20 - 32 mmol/L    Anion Gap 7 3 - 14 mmol/L    Glucose 99 70 - 99 mg/dL    Urea Nitrogen 23 7 - 30 mg/dL    Creatinine 1.64 (H) 0.66 - 1.25 mg/dL    GFR Estimate 59 (L) >60 mL/min/[1.73_m2]    GFR Estimate If Black 68 >60 mL/min/[1.73_m2]    Calcium 10.0 8.5 - 10.1 mg/dL    Phosphorus 2.8 2.5 - 4.5 mg/dL    Albumin 4.3 3.4 - 5.0 g/dL   INR    Collection Time: 02/03/20 10:03 AM   Result Value Ref Range    INR 1.02 0.86 - 1.14   Alkaline phosphatase    Collection Time: 02/03/20 10:03 AM   Result Value Ref Range     Alkaline Phosphatase 70 40 - 150 U/L   ALT    Collection Time: 02/03/20 10:03 AM   Result Value Ref Range    ALT 31 0 - 70 U/L   AST    Collection Time: 02/03/20 10:03 AM   Result Value Ref Range    AST 11 0 - 45 U/L   Bilirubin  total    Collection Time: 02/03/20 10:03 AM   Result Value Ref Range    Bilirubin Total 0.8 0.2 - 1.3 mg/dL   Bilirubin direct    Collection Time: 02/03/20 10:03 AM   Result Value Ref Range    Bilirubin Direct 0.1 0.0 - 0.2 mg/dL   Protein total    Collection Time: 02/03/20 10:03 AM   Result Value Ref Range    Protein Total 8.6 6.8 - 8.8 g/dL   Routine UA with microscopic - No culture (for [facility])    Collection Time: 02/03/20 10:20 AM   Result Value Ref Range    Color Urine Yellow     Appearance Urine Clear     Glucose Urine Negative NEG^Negative mg/dL    Bilirubin Urine Negative NEG^Negative    Ketones Urine Negative NEG^Negative mg/dL    Specific Gravity Urine 1.011 1.003 - 1.035    Blood Urine Negative NEG^Negative    pH Urine 6.0 5.0 - 7.0 pH    Protein Albumin Urine 100 (A) NEG^Negative mg/dL    Urobilinogen mg/dL 0.0 0.0 - 2.0 mg/dL    Nitrite Urine Negative NEG^Negative    Leukocyte Esterase Urine Small (A) NEG^Negative    Source Midstream Urine     WBC Urine 7 (H) 0 - 5 /HPF    RBC Urine 1 0 - 2 /HPF    Mucous Urine Present (A) NEG^Negative /LPF   Protein  random urine with Creat Ratio    Collection Time: 02/03/20 10:20 AM   Result Value Ref Range    Protein Random Urine 0.60 g/L    Protein Total Urine g/gr Creatinine 0.51 (H) 0 - 0.2 g/g Cr   Creatinine urine calculation only    Collection Time: 02/03/20 10:20 AM   Result Value Ref Range    Creatinine Urine 117 mg/dL      IMPRESSION:  My impression is that 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  He has several cysts that are fairly good size in his liver.  His liver function is completely normal and there is no evidence of portal hypertension.  Specifically, he has a normal spleen size and normal platelet  count, and antegrade flow through the portal vein.  I, thus, will not do any variceal screening at this point in time.  I will just see him once a year.      He was getting imaging of the liver once a year and I am not altogether sure why.  Perhaps in 5 years I will repeat an MRCP to make sure there is no significant change to the cysts.       Thank you very much for allowing me to participate in the care of this patient.  If you have any questions regarding my recommendations, please do not hesitate to contact me.       Lan Goss MD      Professor of Medicine  [employer]      Executive Medical Director, Solid Organ Transplant Program  [facility]     Lan Goss MD

## 2020-02-03 NOTE — NURSING NOTE
Chief Complaint   Patient presents with     Consult     Congenital hepatic fibrosis     Pt had vitals taken at previous visit. Provider notified.  Elenita Nguyen CMA  2/3/2020 1:00 PM

## 2020-02-04 NOTE — PROGRESS NOTES
HISTORY OF PRESENT ILLNESS:  I had the pleasure of seeing Solomon Wilcox for consultation in the Liver Clinic at the [facility] on 02/03/2020.  Mr. Wilcox presents for consultation regarding congenital hepatic fibrosis with Caroli disease.      DICTATION ENDS HERE

## 2020-04-10 ENCOUNTER — TELEPHONE (OUTPATIENT)
Dept: NEPHROLOGY | Facility: CLINIC | Age: 20
End: 2020-04-10

## 2020-04-10 NOTE — TELEPHONE ENCOUNTER
Spoke to patients mom, Rain, patient has Lab appointment for [place] [facility] [place] for labs for Dr. Chapin and Dr. Goss. Orders placed  Cassi Solo LPN  Nephrology  [phone]

## 2020-05-04 ENCOUNTER — TELEPHONE (OUTPATIENT)
Dept: NEPHROLOGY | Facility: CLINIC | Age: 20
End: 2020-05-04

## 2020-05-04 DIAGNOSIS — I15.8 OTHER SECONDARY HYPERTENSION: ICD-10-CM

## 2020-05-04 RX ORDER — LISINOPRIL 10 MG/1
10 TABLET ORAL DAILY
Qty: 90 TABLET | Refills: 3 | Status: SHIPPED | OUTPATIENT
Start: 2020-05-04 | End: 2020-08-03

## 2020-05-04 NOTE — TELEPHONE ENCOUNTER
[facility] Call Center    Phone Message    May a detailed message be left on voicemail: yes     Reason for Call: Medication Refill Request    Has the patient contacted the pharmacy for the refill? Yes   Name of medication being requested: lisinopril (PRINIVIL/ZESTRIL) 10 MG tablet   Provider who prescribed the medication:   Pharmacy: [facility] [id number] [address]   Date medication is needed: asap transferring from [facility]          Action Taken: Message routed to:  [facility] neph    Travel Screening: Not Applicable

## 2020-05-05 DIAGNOSIS — N18.2 CKD (CHRONIC KIDNEY DISEASE), STAGE II: ICD-10-CM

## 2020-05-05 LAB
ALBUMIN SERPL-MCNC: 4 G/DL (ref 3.4–5)
ALBUMIN UR-MCNC: 30 MG/DL
ALP SERPL-CCNC: 53 U/L (ref 40–150)
ALT SERPL W P-5'-P-CCNC: 37 U/L (ref 0–70)
ANION GAP SERPL CALCULATED.3IONS-SCNC: 6 MMOL/L (ref 3–14)
APPEARANCE UR: CLEAR
AST SERPL W P-5'-P-CCNC: 18 U/L (ref 0–45)
BACTERIA #/AREA URNS HPF: ABNORMAL /HPF
BILIRUB DIRECT SERPL-MCNC: 0.1 MG/DL (ref 0–0.2)
BILIRUB SERPL-MCNC: 0.5 MG/DL (ref 0.2–1.3)
BILIRUB UR QL STRIP: NEGATIVE
BUN SERPL-MCNC: 21 MG/DL (ref 7–30)
CALCIUM SERPL-MCNC: 9.5 MG/DL (ref 8.5–10.1)
CHLORIDE SERPL-SCNC: 104 MMOL/L (ref 94–109)
CO2 SERPL-SCNC: 29 MMOL/L (ref 20–32)
COLOR UR AUTO: ABNORMAL
CREAT SERPL-MCNC: 1.29 MG/DL (ref 0.66–1.25)
CREAT UR-MCNC: 79 MG/DL
GFR SERPL CREATININE-BSD FRML MDRD: 78 ML/MIN/{1.73_M2}
GLUCOSE SERPL-MCNC: 92 MG/DL (ref 70–99)
GLUCOSE UR STRIP-MCNC: NEGATIVE MG/DL
HGB UR QL STRIP: NEGATIVE
INR PPP: 0.97 (ref 0.86–1.14)
KETONES UR STRIP-MCNC: NEGATIVE MG/DL
LEUKOCYTE ESTERASE UR QL STRIP: ABNORMAL
NITRATE UR QL: NEGATIVE
PH UR STRIP: 6 PH (ref 5–7)
POTASSIUM SERPL-SCNC: 3.6 MMOL/L (ref 3.4–5.3)
PROT SERPL-MCNC: 8.3 G/DL (ref 6.8–8.8)
PROT UR-MCNC: 0.32 G/L
PROT/CREAT 24H UR: 0.41 G/G CR (ref 0–0.2)
RBC #/AREA URNS AUTO: 1 /HPF (ref 0–2)
SODIUM SERPL-SCNC: 139 MMOL/L (ref 133–144)
SOURCE: ABNORMAL
SP GR UR STRIP: 1.01 (ref 1–1.03)
UROBILINOGEN UR STRIP-MCNC: 0 MG/DL (ref 0–2)
WBC #/AREA URNS AUTO: 4 /HPF (ref 0–5)

## 2020-08-03 ENCOUNTER — TELEPHONE (OUTPATIENT)
Dept: NEPHROLOGY | Facility: CLINIC | Age: 20
End: 2020-08-03

## 2020-08-03 ENCOUNTER — TELEPHONE (OUTPATIENT)
Dept: GASTROENTEROLOGY | Facility: CLINIC | Age: 20
End: 2020-08-03

## 2020-08-03 DIAGNOSIS — I15.8 OTHER SECONDARY HYPERTENSION: ICD-10-CM

## 2020-08-03 DIAGNOSIS — Q61.3 POLYCYSTIC KIDNEY DISEASE: ICD-10-CM

## 2020-08-03 RX ORDER — HYDROCHLOROTHIAZIDE 25 MG/1
25 TABLET ORAL 2 TIMES DAILY
Qty: 180 TABLET | Refills: 1 | Status: SHIPPED | OUTPATIENT
Start: 2020-08-03 | End: 2020-08-10

## 2020-08-03 RX ORDER — LISINOPRIL 10 MG/1
10 TABLET ORAL DAILY
Qty: 90 TABLET | Refills: 3 | Status: SHIPPED | OUTPATIENT
Start: 2020-08-03 | End: 2020-08-10

## 2020-08-03 RX ORDER — URSODIOL 300 MG/1
300 CAPSULE ORAL 2 TIMES DAILY
Qty: 180 CAPSULE | Refills: 1 | Status: SHIPPED | OUTPATIENT
Start: 2020-08-03 | End: 2020-12-30

## 2020-08-03 NOTE — TELEPHONE ENCOUNTER
Spoke to patients mom, lisinopril and hydrochlorothiazide sent to [facility]. Spoke to patient (conference call), scheduled lab at 3 this Friday. Patient works nights, mom will call him early on Monday to be sure he is available for Telephone visit on Monday.  Cassi Solo LPN  Nephrology  [phone]

## 2020-08-03 NOTE — TELEPHONE ENCOUNTER
[facility] Call Center    Phone Message    May a detailed message be left on voicemail: yes     Reason for Call: Medication Refill Request    Has the patient contacted the pharmacy for the refill? Yes   Name of medication being requested: ursodiol (ACTIGALL) 300 MG capsule   Provider who prescribed the medication: Lan Goss  Pharmacy: [facility]  Date medication is needed: now-pt is running out - used to be prescribed by his previous MD, needs new RX from Dr Goss. Thanks         Action Taken: Message routed to:  [facility] jesika hep    Travel Screening: Not Applicable

## 2020-08-03 NOTE — TELEPHONE ENCOUNTER
[facility] Call Center    Phone Message    May a detailed message be left on voicemail: yes     Reason for Call: Medication Refill Request    Has the patient contacted the pharmacy for the refill? Yes   Name of medication being requested: Lisinopril, HCTZ  Provider who prescribed the medication: ROZ Chapin  Pharmacy: [facility]  Date medication is needed: now - pt is running out - needs new rx in Dr Chapin's name. Thanks.         Action Taken: Message routed to:  [facility] renal    Travel Screening: Not Applicable

## 2020-08-07 DIAGNOSIS — I15.8 OTHER SECONDARY HYPERTENSION: ICD-10-CM

## 2020-08-07 LAB
ALBUMIN SERPL-MCNC: 4.2 G/DL (ref 3.4–5)
ALBUMIN UR-MCNC: NEGATIVE MG/DL
ANION GAP SERPL CALCULATED.3IONS-SCNC: 6 MMOL/L (ref 3–14)
APPEARANCE UR: CLEAR
BILIRUB UR QL STRIP: NEGATIVE
BUN SERPL-MCNC: 19 MG/DL (ref 7–30)
CALCIUM SERPL-MCNC: 9.4 MG/DL (ref 8.5–10.1)
CHLORIDE SERPL-SCNC: 106 MMOL/L (ref 94–109)
CO2 SERPL-SCNC: 28 MMOL/L (ref 20–32)
COLOR UR AUTO: ABNORMAL
CREAT SERPL-MCNC: 1.4 MG/DL (ref 0.66–1.25)
CREAT UR-MCNC: 55 MG/DL
ERYTHROCYTE [DISTWIDTH] IN BLOOD BY AUTOMATED COUNT: 12.5 % (ref 10–15)
GFR SERPL CREATININE-BSD FRML MDRD: 71 ML/MIN/{1.73_M2}
GLUCOSE SERPL-MCNC: 87 MG/DL (ref 70–99)
GLUCOSE UR STRIP-MCNC: NEGATIVE MG/DL
HCT VFR BLD AUTO: 45.3 % (ref 40–53)
HGB BLD-MCNC: 15.2 G/DL (ref 13.3–17.7)
HGB UR QL STRIP: NEGATIVE
KETONES UR STRIP-MCNC: NEGATIVE MG/DL
LEUKOCYTE ESTERASE UR QL STRIP: NEGATIVE
MCH RBC QN AUTO: 30.1 PG (ref 26.5–33)
MCHC RBC AUTO-ENTMCNC: 33.6 G/DL (ref 31.5–36.5)
MCV RBC AUTO: 90 FL (ref 78–100)
MUCOUS THREADS #/AREA URNS LPF: PRESENT /LPF
NITRATE UR QL: NEGATIVE
PH UR STRIP: 7 PH (ref 5–7)
PHOSPHATE SERPL-MCNC: 2.9 MG/DL (ref 2.5–4.5)
PLATELET # BLD AUTO: 264 10E9/L (ref 150–450)
POTASSIUM SERPL-SCNC: 3.4 MMOL/L (ref 3.4–5.3)
PROT UR-MCNC: 0.26 G/L
PROT/CREAT 24H UR: 0.46 G/G CR (ref 0–0.2)
PTH-INTACT SERPL-MCNC: 24 PG/ML (ref 18–80)
RBC # BLD AUTO: 5.05 10E12/L (ref 4.4–5.9)
RBC #/AREA URNS AUTO: <1 /HPF (ref 0–2)
SODIUM SERPL-SCNC: 140 MMOL/L (ref 133–144)
SOURCE: ABNORMAL
SP GR UR STRIP: 1.01 (ref 1–1.03)
UROBILINOGEN UR STRIP-MCNC: 0 MG/DL (ref 0–2)
WBC # BLD AUTO: 6.1 10E9/L (ref 4–11)
WBC #/AREA URNS AUTO: 1 /HPF (ref 0–5)

## 2020-08-10 ENCOUNTER — VIRTUAL VISIT (OUTPATIENT)
Dept: NEPHROLOGY | Facility: CLINIC | Age: 20
End: 2020-08-10
Attending: INTERNAL MEDICINE
Payer: COMMERCIAL

## 2020-08-10 DIAGNOSIS — I15.8 OTHER SECONDARY HYPERTENSION: ICD-10-CM

## 2020-08-10 DIAGNOSIS — Q61.19 AUTOSOMAL RECESSIVE POLYCYSTIC KIDNEY DISEASE: Primary | ICD-10-CM

## 2020-08-10 LAB — DEPRECATED CALCIDIOL+CALCIFEROL SERPL-MC: 51 UG/L (ref 20–75)

## 2020-08-10 RX ORDER — LISINOPRIL 10 MG/1
30 TABLET ORAL DAILY
Qty: 90 TABLET | Refills: 3 | Status: SHIPPED | OUTPATIENT
Start: 2020-08-10 | End: 2020-09-16

## 2020-08-10 ASSESSMENT — PAIN SCALES - GENERAL: PAINLEVEL: NO PAIN (0)

## 2020-08-10 NOTE — PROGRESS NOTES
"  Solomon Wilcox is a 20 year old male who is being evaluated via a billable telephone visit.      The patient has been notified of following:     \"This telephone visit will be conducted via a call between you and your physician/provider. We have found that certain health care needs can be provided without the need for a physical exam.  This service lets us provide the care you need with a short phone conversation.  If a prescription is necessary we can send it directly to your pharmacy.  If lab work is needed we can place an order for that and you can then stop by our lab to have the test done at a later time.    Telephone visits are billed at different rates depending on your insurance coverage. During this emergency period, for some insurers they may be billed the same as an in-person visit.  Please reach out to your insurance provider with any questions.    If during the course of the call the physician/provider feels a telephone visit is not appropriate, you will not be charged for this service.\"    Patient has given verbal consent for Telephone visit?  Yes    How would you like to obtain your AVS? Octoplushart    Phone call duration: 15 minutes    Jayla Chapin MD      Interval history:    He has not been checking his blood pressures at home. Denies any new complaints or concerns at this time      Assessment and Plan:  #CKD stage 2  #Hypertension  #Hepatic fibrosis- Liver function is stable. Following with Dr. 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 Creatinine has been very stable over the past few years.Mild proteinuria. No hematuria. Likely increase in creatinine due to increased muscle mass, he does frequent weight training. Blood pressures are under good control on lisinopril and hydrochlorothiazide, has not been checking BP at home. Denies any symptoms concerning for hypervolemia.     Plan:   --Increase lisinopril to 30 mg daily in order to get him to a maximum tolerated dose   --recheck labs and blood pressures " in 1 week after increasing Lisinopril.      Follow-up: 6 months     Reason For Visit:   Transfer of service    HPI:   Solomon Wilcox is a 19 year old male with a history of ARPKD, CKD stage II, hypertension who presents with his mother for transfer of service. He was first diagnosed with ARPKD around the time of his birth. Both the mother and father are carriers of the disease. His mother notes her pregnancy with the patient was complicated with an emergency  and a pneumothorax  Liver cysts were discovered at the age of 1. Since then, his kidney function has been around 70% and has remained stable. Patient was very active with sports in high school. He was last evaluated by Dr. Ni in pediatric nephrology on 19 at which time he reported no new kidney concerns. Since then he has been doing well with no hospitalizations and no surgeries. He is not having abdominal or flank pain, headaches, gross hematuria. His energy is good. Patient typically exercises 4-5 times per week, mostly weight training. He is not taking any supplements. No history of kidney stones. No other health concerns. Patient has a history of gallbladder removal and had an averse reaction to the anesthetic. He currently attends college at [employer] and is studying environmental studies.        Review of Systems:   Pertinent items are noted in HPI or as below, remainder of complete ROS is negative.      Active Medications:     Current Outpatient Medications:      cholecalciferol (VITAMIN  -D) 1000 UNITS capsule, Take 1 capsule by mouth daily, Disp: , Rfl:      cyanocolbalamin (VITAMIN  B-12) 500 MCG tablet, Take 500 mcg by mouth daily, Disp: , Rfl:      hydrochlorothiazide (HYDRODIURIL) 25 MG tablet, Take 1 tablet (25 mg) by mouth 2 times daily, Disp: 180 tablet, Rfl: 1     lisinopril (PRINIVIL/ZESTRIL) 10 MG tablet, Take 1 tablet (10 mg) by mouth daily, Disp: 90 tablet, Rfl: 3     ursodiol (ACTIGALL) 300 MG capsule, Take 1  capsule (300 mg) by mouth 2 times daily, Disp: 180 capsule, Rfl: 3     adapalene (DIFFERIN) 0.1 % cream, Apply topically At Bedtime Apply as directed, Disp: , Rfl:      citalopram (CELEXA) 40 MG tablet, Take 1 tablet by mouth daily., Disp: , Rfl:      guanFACINE HCl (INTUNIV) 3 MG TB24, Take 1 tablet by mouth daily., Disp: , Rfl:      risperiDONE (RISPERDAL) 0.5 MG tablet, Take 0.5 mg by mouth 2 times daily, Disp: , Rfl:      Allergies:   The patient reports no known allergies.     Past Medical History:  Autosomal recessive polycystic kidney disease   CKD (chronic kidney disease), stage II   Drug reaction   Liver disease, cystic, congenital   Short stature   Unspecified hypertensive kidney disease with chronic kidney disease stage I through stage IV, or unspecified(403.90)      Past Surgical History:  Laparoscopic cholecystectomy; 10/24/14  Nose surgery    Family History:   Father: Hypertension, lipids  Paternal grandfather: Myocardial infarction      Social History:   No marijuana use  Occasional alcohol use  Occasional marijuana use  Studies environmental studies at [employer]    Physical Exam:  There were no vitals taken for this visit.   GENERAL APPEARANCE: alert and no distress  EYES: no scleral icterus, pupils equal  HENT: NC/AT,  mouth  without ulcers or lesions  Endocrine: no goiter, no moon facies  Pulmonary: Breathing comfortably.   CV: No edema and well perfused  MS: no evidence of inflammation in joints, no muscle tenderness  SKIN: no rash, warm, dry, no cyanosis  NEURO: mentation intact and speech normal      Laboratory:  CMP  Recent Labs   Lab Test 08/07/20  1527 05/05/20  1021 02/03/20  1003 07/25/19  0902 02/01/19  1012  10/25/14  0407 10/24/14  2225 10/24/14  1235    139 137 138 139   < >  --  135 141   POTASSIUM 3.4 3.6 3.6 4.2 4.3   < >  --  3.8 4.0   CHLORIDE 106 104 105 105 104   < >  --  101 107   CO2 28 29 25 29 28   < >  --  26 28   ANIONGAP 6 6 7 4 8   < >  --  8 6   GLC 87 92  99 89 99   < >  --  118* 89   BUN 19 21 23 28 16   < >  --  18 20   CR 1.40* 1.29* 1.64* 1.47* 1.46*   < >  --  1.26* 1.20*   GFRESTIMATED 71 78 59* 67 68   < >  --  76 80   GFRESTBLACK 82 >90 68 78 79   < >  --  >90   GFR Calc   >90   GFR Calc     PITO 9.4 9.5 10.0 9.5 9.5   < >  --  8.9* 9.3   MAG  --   --   --   --   --   --  2.0 1.2* 1.4*   PHOS 2.9  --  2.8 3.6 3.1   < >  --  3.4  --    PROTTOTAL  --  8.3 8.6 8.2 8.3   < >  --   --   --    ALBUMIN 4.2 4.0 4.3 3.9 4.1   < >  --  3.9  --    BILITOTAL  --  0.5 0.8 0.3 0.7   < >  --   --   --    ALKPHOS  --  53 70 78 88   < >  --   --   --    AST  --  18 11 26 35   < >  --   --   --    ALT  --  37 31 63 93*   < >  --   --   --     < > = values in this interval not displayed.     CBC  Recent Labs   Lab Test 08/07/20  1527 02/03/20  1003 07/25/19  0902 02/01/19  1012   HGB 15.2 17.1 15.1 16.2   WBC 6.1 6.5 4.7 7.4   RBC 5.05 5.75 5.32 5.40   HCT 45.3 50.6 48.0 48.7   MCV 90 88 90 90   MCH 30.1 29.7 28.4 30.0   MCHC 33.6 33.8 31.5 33.3   RDW 12.5 12.5 13.2 13.2    297 261 271     INR  Recent Labs   Lab Test 05/05/20  1021 02/03/20  1003 08/31/15  1030 08/26/14  0955   INR 0.97 1.02 1.03 0.99   PTT  --   --  28  --      ABG  No lab results found.   URINE STUDIES  Recent Labs   Lab Test 08/07/20  1532 05/05/20  1022 02/03/20  1020 07/25/19  0902   COLOR Straw Straw Yellow Light Yellow   APPEARANCE Clear Clear Clear Clear   URINEGLC Negative Negative Negative Negative   URINEBILI Negative Negative Negative Negative   URINEKETONE Negative Negative Negative Negative   SG 1.009 1.010 1.011 1.010   UBLD Negative Negative Negative Negative   URINEPH 7.0 6.0 6.0 6.5   PROTEIN Negative 30* 100* Negative   NITRITE Negative Negative Negative Negative   LEUKEST Negative Trace* Small* Negative   RBCU <1 1 1 <1   WBCU 1 4 7* 1     Recent Labs   Lab Test 08/07/20  1532 05/05/20  1022 02/03/20  1020 02/06/14  1515 02/14/13  0825   UTPG 0.46* 0.41*  0.51* 0.10 0.13     PTH  Recent Labs   Lab Test 08/07/20  1527 07/25/19  0902 02/01/19  1012 08/23/18  0840 12/22/17  1207 08/10/17  0850 12/22/16  1410 08/02/16  1115 02/11/16  0809 02/19/15  1600 02/14/13  0825   PTHI 24 7* 35 10* <3* 8* <3* 19 36 54 41     IRON STUDIES   Recent Labs   Lab Test 07/25/19  0902 02/01/19  1012 12/22/17  1207   ZEE 82 47 13*       Imaging:   US Abdomen complete with doppler (7/25/19):  1. Intrahepatic ductal dilatation, stable from prior exam. Doppler evaluation as normal.  2. Polycystic kidney disease.    Per radiology.

## 2020-08-10 NOTE — LETTER
"8/10/2020       RE: Solomon Wilcox  [address]  [facility] [address]     Dear Colleague,    Thank you for referring your patient, Solomon Wilcox, to the [facility] NEPHROLOGY at [facility]. Please see a copy of my visit note below.      Solomon Wilcox is a 20 year old male who is being evaluated via a billable telephone visit.      The patient has been notified of following:     \"This telephone visit will be conducted via a call between you and your physician/provider. We have found that certain health care needs can be provided without the need for a physical exam.  This service lets us provide the care you need with a short phone conversation.  If a prescription is necessary we can send it directly to your pharmacy.  If lab work is needed we can place an order for that and you can then stop by our lab to have the test done at a later time.    Telephone visits are billed at different rates depending on your insurance coverage. During this emergency period, for some insurers they may be billed the same as an in-person visit.  Please reach out to your insurance provider with any questions.    If during the course of the call the physician/provider feels a telephone visit is not appropriate, you will not be charged for this service.\"    Patient has given verbal consent for Telephone visit?  Yes    How would you like to obtain your AVS? MyChart    Phone call duration: 15 minutes    Jayla Chapin MD      Interval history:    He has not been checking his blood pressures at home. Denies any new complaints or concerns at this time      Assessment and Plan:  #CKD stage 2  #Hypertension  #Hepatic fibrosis- Liver function is stable. Following with Dr. 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 Creatinine has been very stable over the past few years.Mild proteinuria. No hematuria. Likely increase in creatinine due to increased muscle mass, he does frequent weight training. Blood " pressures are under good control on lisinopril and hydrochlorothiazide, has not been checking BP at home. Denies any symptoms concerning for hypervolemia.     Plan:   --Increase lisinopril to 30 mg daily in order to get him to a maximum tolerated dose   --recheck labs and blood pressures in 1 week after increasing Lisinopril.      Follow-up: 6 months     Reason For Visit:   Transfer of service    HPI:   Solomon Wilcox is a 19 year old male with a history of ARPKD, CKD stage II, hypertension who presents with his mother for transfer of service. He was first diagnosed with ARPKD around the time of his birth. Both the mother and father are carriers of the disease. His mother notes her pregnancy with the patient was complicated with an emergency  and a pneumothorax  Liver cysts were discovered at the age of 1. Since then, his kidney function has been around 70% and has remained stable. Patient was very active with sports in high school. He was last evaluated by Dr. Ni in pediatric nephrology on 19 at which time he reported no new kidney concerns. Since then he has been doing well with no hospitalizations and no surgeries. He is not having abdominal or flank pain, headaches, gross hematuria. His energy is good. Patient typically exercises 4-5 times per week, mostly weight training. He is not taking any supplements. No history of kidney stones. No other health concerns. Patient has a history of gallbladder removal and had an averse reaction to the anesthetic. He currently attends college at [employer] and is studying environmental studies.        Review of Systems:   Pertinent items are noted in HPI or as below, remainder of complete ROS is negative.      Active Medications:     Current Outpatient Medications:      cholecalciferol (VITAMIN  -D) 1000 UNITS capsule, Take 1 capsule by mouth daily, Disp: , Rfl:      cyanocolbalamin (VITAMIN  B-12) 500 MCG tablet, Take 500 mcg by mouth daily, Disp: ,  Rfl:      hydrochlorothiazide (HYDRODIURIL) 25 MG tablet, Take 1 tablet (25 mg) by mouth 2 times daily, Disp: 180 tablet, Rfl: 1     lisinopril (PRINIVIL/ZESTRIL) 10 MG tablet, Take 1 tablet (10 mg) by mouth daily, Disp: 90 tablet, Rfl: 3     ursodiol (ACTIGALL) 300 MG capsule, Take 1 capsule (300 mg) by mouth 2 times daily, Disp: 180 capsule, Rfl: 3     adapalene (DIFFERIN) 0.1 % cream, Apply topically At Bedtime Apply as directed, Disp: , Rfl:      citalopram (CELEXA) 40 MG tablet, Take 1 tablet by mouth daily., Disp: , Rfl:      guanFACINE HCl (INTUNIV) 3 MG TB24, Take 1 tablet by mouth daily., Disp: , Rfl:      risperiDONE (RISPERDAL) 0.5 MG tablet, Take 0.5 mg by mouth 2 times daily, Disp: , Rfl:      Allergies:   The patient reports no known allergies.     Past Medical History:  Autosomal recessive polycystic kidney disease   CKD (chronic kidney disease), stage II   Drug reaction   Liver disease, cystic, congenital   Short stature   Unspecified hypertensive kidney disease with chronic kidney disease stage I through stage IV, or unspecified(403.90)      Past Surgical History:  Laparoscopic cholecystectomy; 10/24/14  Nose surgery    Family History:   Father: Hypertension, lipids  Paternal grandfather: Myocardial infarction      Social History:   No marijuana use  Occasional alcohol use  Occasional marijuana use  Studies environmental studies at [employer]    Physical Exam:  There were no vitals taken for this visit.   GENERAL APPEARANCE: alert and no distress  EYES: no scleral icterus, pupils equal  HENT: NC/AT,  mouth  without ulcers or lesions  Endocrine: no goiter, no moon facies  Pulmonary: Breathing comfortably.   CV: No edema and well perfused  MS: no evidence of inflammation in joints, no muscle tenderness  SKIN: no rash, warm, dry, no cyanosis  NEURO: mentation intact and speech normal      Laboratory:  CMP  Recent Labs   Lab Test 08/07/20  1527 05/05/20  1021 02/03/20  1003 07/25/19  0902  02/01/19  1012  10/25/14  0407 10/24/14  2225 10/24/14  1235    139 137 138 139   < >  --  135 141   POTASSIUM 3.4 3.6 3.6 4.2 4.3   < >  --  3.8 4.0   CHLORIDE 106 104 105 105 104   < >  --  101 107   CO2 28 29 25 29 28   < >  --  26 28   ANIONGAP 6 6 7 4 8   < >  --  8 6   GLC 87 92 99 89 99   < >  --  118* 89   BUN 19 21 23 28 16   < >  --  18 20   CR 1.40* 1.29* 1.64* 1.47* 1.46*   < >  --  1.26* 1.20*   GFRESTIMATED 71 78 59* 67 68   < >  --  76 80   GFRESTBLACK 82 >90 68 78 79   < >  --  >90   GFR Calc   >90   GFR Calc     PITO 9.4 9.5 10.0 9.5 9.5   < >  --  8.9* 9.3   MAG  --   --   --   --   --   --  2.0 1.2* 1.4*   PHOS 2.9  --  2.8 3.6 3.1   < >  --  3.4  --    PROTTOTAL  --  8.3 8.6 8.2 8.3   < >  --   --   --    ALBUMIN 4.2 4.0 4.3 3.9 4.1   < >  --  3.9  --    BILITOTAL  --  0.5 0.8 0.3 0.7   < >  --   --   --    ALKPHOS  --  53 70 78 88   < >  --   --   --    AST  --  18 11 26 35   < >  --   --   --    ALT  --  37 31 63 93*   < >  --   --   --     < > = values in this interval not displayed.     CBC  Recent Labs   Lab Test 08/07/20  1527 02/03/20  1003 07/25/19  0902 02/01/19  1012   HGB 15.2 17.1 15.1 16.2   WBC 6.1 6.5 4.7 7.4   RBC 5.05 5.75 5.32 5.40   HCT 45.3 50.6 48.0 48.7   MCV 90 88 90 90   MCH 30.1 29.7 28.4 30.0   MCHC 33.6 33.8 31.5 33.3   RDW 12.5 12.5 13.2 13.2    297 261 271     INR  Recent Labs   Lab Test 05/05/20  1021 02/03/20  1003 08/31/15  1030 08/26/14  0955   INR 0.97 1.02 1.03 0.99   PTT  --   --  28  --      ABG  No lab results found.   URINE STUDIES  Recent Labs   Lab Test 08/07/20  1532 05/05/20  1022 02/03/20  1020 07/25/19  0902   COLOR Straw Straw Yellow Light Yellow   APPEARANCE Clear Clear Clear Clear   URINEGLC Negative Negative Negative Negative   URINEBILI Negative Negative Negative Negative   URINEKETONE Negative Negative Negative Negative   SG 1.009 1.010 1.011 1.010   UBLD Negative Negative Negative Negative   URINEPH  7.0 6.0 6.0 6.5   PROTEIN Negative 30* 100* Negative   NITRITE Negative Negative Negative Negative   LEUKEST Negative Trace* Small* Negative   RBCU <1 1 1 <1   WBCU 1 4 7* 1     Recent Labs   Lab Test 08/07/20  1532 05/05/20  1022 02/03/20  1020 02/06/14  1515 02/14/13  0825   UTPG 0.46* 0.41* 0.51* 0.10 0.13     PTH  Recent Labs   Lab Test 08/07/20  1527 07/25/19  0902 02/01/19  1012 08/23/18  0840 12/22/17  1207 08/10/17  0850 12/22/16  1410 08/02/16  1115 02/11/16  0809 02/19/15  1600 02/14/13  0825   PTHI 24 7* 35 10* <3* 8* <3* 19 36 54 41     IRON STUDIES   Recent Labs   Lab Test 07/25/19  0902 02/01/19  1012 12/22/17  1207   ZEE 82 47 13*       Imaging:   US Abdomen complete with doppler (7/25/19):  1. Intrahepatic ductal dilatation, stable from prior exam. Doppler evaluation as normal.  2. Polycystic kidney disease.    Per radiology.        Again, thank you for allowing me to participate in the care of your patient.      Sincerely,    Jayla Chapin MD

## 2020-08-10 NOTE — PROGRESS NOTES
"Solomon Wilcox is a 20 year old male who is being evaluated via a billable telephone visit.      The patient has been notified of following:     \"This telephone visit will be conducted via a call between you and your physician/provider. We have found that certain health care needs can be provided without the need for a physical exam.  This service lets us provide the care you need with a short phone conversation.  If a prescription is necessary we can send it directly to your pharmacy.  If lab work is needed we can place an order for that and you can then stop by our lab to have the test done at a later time.    Telephone visits are billed at different rates depending on your insurance coverage. During this emergency period, for some insurers they may be billed the same as an in-person visit.  Please reach out to your insurance provider with any questions.    If during the course of the call the physician/provider feels a telephone visit is not appropriate, you will not be charged for this service.\"    Patient has given verbal consent for Telephone visit?  Yes    What phone number would you like to be contacted at? [phone]    How would you like to obtain your AVS? Mail a copy    Phone call duration: *** minutes    {signature options:[id number]}      "

## 2020-08-12 ENCOUNTER — TELEPHONE (OUTPATIENT)
Dept: NEPHROLOGY | Facility: CLINIC | Age: 20
End: 2020-08-12

## 2020-08-12 DIAGNOSIS — N18.2 CKD (CHRONIC KIDNEY DISEASE), STAGE II: Primary | ICD-10-CM

## 2020-08-12 NOTE — TELEPHONE ENCOUNTER
Spoke to patients mom, he is in the [facility] right now and she will discuss with him when he returns to see how he wants to proceed. Mom had questions about how he will have his blood pressure checked after they have been good this whole time on hydrochlorothiazide and now switching to lisinopril she wanted to know why. Reviewed with Dr. Chapin, recommendation to switch to lisinopril is that overtime his proteinuria is increasing, this will help protect his kidneys by minimizing the protein leaking past his kidneys as well as help with his Blood pressure. We will need to keep an eye on his creatinine and potassium levels and should keep an eye on blood pressure as well if he decides to switch. Writer explained to mom that we can do a nurse visit at the time of the lab draw to see how his blood pressures are for a while if that works. Await to hear back from mom and or patient.  Cassi Solo LPN  Nephrology  [phone]

## 2020-08-12 NOTE — TELEPHONE ENCOUNTER
----- Message from Jayla Chapin MD sent at 8/10/2020 11:11 AM CDT -----  Rosalio RANDOLPH,    I discontinued his hydrochlorothiazide and increased Lisinopril to 30 mg daily. Can you put in for BMP for future, He might make the changes sometime next week and he would check labs a week after.     -S

## 2020-08-26 ENCOUNTER — TELEPHONE (OUTPATIENT)
Dept: NEPHROLOGY | Facility: CLINIC | Age: 20
End: 2020-08-26

## 2020-08-26 NOTE — TELEPHONE ENCOUNTER
Patient reports that he started lisinopril this Monday. We schedule lab for 8/31, where he will also come up to clinic for BP check.  Cassi Solo LPN  Nephrology  [phone]

## 2020-08-31 ENCOUNTER — ALLIED HEALTH/NURSE VISIT (OUTPATIENT)
Dept: TRANSPLANT | Facility: CLINIC | Age: 20
End: 2020-08-31
Attending: INTERNAL MEDICINE
Payer: COMMERCIAL

## 2020-08-31 DIAGNOSIS — N18.2 CKD (CHRONIC KIDNEY DISEASE), STAGE II: ICD-10-CM

## 2020-08-31 DIAGNOSIS — N18.2 CKD (CHRONIC KIDNEY DISEASE), STAGE II: Primary | ICD-10-CM

## 2020-08-31 LAB
ANION GAP SERPL CALCULATED.3IONS-SCNC: 4 MMOL/L (ref 3–14)
BUN SERPL-MCNC: 19 MG/DL (ref 7–30)
CALCIUM SERPL-MCNC: 9.2 MG/DL (ref 8.5–10.1)
CHLORIDE SERPL-SCNC: 109 MMOL/L (ref 94–109)
CO2 SERPL-SCNC: 27 MMOL/L (ref 20–32)
CREAT SERPL-MCNC: 1.44 MG/DL (ref 0.66–1.25)
GFR SERPL CREATININE-BSD FRML MDRD: 68 ML/MIN/{1.73_M2}
GLUCOSE SERPL-MCNC: 85 MG/DL (ref 70–99)
POTASSIUM SERPL-SCNC: 4.4 MMOL/L (ref 3.4–5.3)
SODIUM SERPL-SCNC: 140 MMOL/L (ref 133–144)

## 2020-08-31 PROCEDURE — 80048 BASIC METABOLIC PNL TOTAL CA: CPT | Performed by: INTERNAL MEDICINE

## 2020-08-31 PROCEDURE — 36415 COLL VENOUS BLD VENIPUNCTURE: CPT | Performed by: INTERNAL MEDICINE

## 2020-08-31 PROCEDURE — 40000269 ZZH STATISTIC NO CHARGE FACILITY FEE: Mod: ZF

## 2020-08-31 NOTE — PROGRESS NOTES
Patient presents to clinic for BP check after labs draw (recently increase lisinopril to 30 mg per Dr. Chapin). Patient sitting comfortably, using adult BP cuff.  BP Rt arm: 131/66 P 78  Lt arm: 123/78 P 73  3 BPs on Rt arm: 123/76, 122/73, 118/76 P 74    Patient reports to be feeling well, no changes. Labs still In process. Will send to Dr. Chapin for review.  Cassi Solo LPN  Nephrology  [phone]

## 2020-09-16 DIAGNOSIS — I15.8 OTHER SECONDARY HYPERTENSION: ICD-10-CM

## 2020-09-16 RX ORDER — LISINOPRIL 30 MG/1
30 TABLET ORAL DAILY
Qty: 90 TABLET | Refills: 3 | Status: SHIPPED | OUTPATIENT
Start: 2020-09-16 | End: 2021-01-21

## 2020-09-16 RX ORDER — LISINOPRIL 10 MG/1
30 TABLET ORAL DAILY
Qty: 90 TABLET | Refills: 11 | Status: SHIPPED | OUTPATIENT
Start: 2020-09-16 | End: 2020-09-16

## 2020-10-23 DIAGNOSIS — N18.2 CKD (CHRONIC KIDNEY DISEASE), STAGE II: Primary | ICD-10-CM

## 2020-10-23 NOTE — PROGRESS NOTES
Call to patient and mom, left voice messages per Dr. Chapin.  Standing lab orders every 4 months and follow up visit. Will send my chart as well.  Cassi Solo LPN  Nephrology  [phone]

## 2020-11-14 ENCOUNTER — HEALTH MAINTENANCE LETTER (OUTPATIENT)
Age: 20
End: 2020-11-14

## 2020-12-30 ENCOUNTER — TELEPHONE (OUTPATIENT)
Dept: GASTROENTEROLOGY | Facility: CLINIC | Age: 20
End: 2020-12-30

## 2020-12-30 DIAGNOSIS — Q61.3 POLYCYSTIC KIDNEY DISEASE: ICD-10-CM

## 2020-12-30 RX ORDER — URSODIOL 300 MG/1
300 CAPSULE ORAL 2 TIMES DAILY
Qty: 180 CAPSULE | Refills: 3 | Status: SHIPPED | OUTPATIENT
Start: 2020-12-30 | End: 2022-01-12

## 2020-12-30 NOTE — TELEPHONE ENCOUNTER
[facility] Call Center    Phone Message    May a detailed message be left on voicemail: yes     Reason for Call: Medication Refill Request    Has the patient contacted the pharmacy for the refill? Yes   Name of medication being requested: ursodiol (ACTIGALL) 300 MG capsule   Provider who prescribed the medication: Dr. Goss  Pharmacy: [facility] [id number] [address] [place]  Date medication is needed: asap       Action Taken: Message routed to:  [facility] ([facility]): hep    Travel Screening: Not Applicable

## 2021-01-21 ENCOUNTER — OFFICE VISIT (OUTPATIENT)
Dept: NEPHROLOGY | Facility: CLINIC | Age: 21
End: 2021-01-21
Attending: INTERNAL MEDICINE
Payer: COMMERCIAL

## 2021-01-21 VITALS
OXYGEN SATURATION: 97 % | SYSTOLIC BLOOD PRESSURE: 147 MMHG | BODY MASS INDEX: 31.75 KG/M2 | TEMPERATURE: 97.9 F | WEIGHT: 221.8 LBS | HEIGHT: 70 IN | HEART RATE: 123 BPM | DIASTOLIC BLOOD PRESSURE: 92 MMHG

## 2021-01-21 DIAGNOSIS — N18.2 CKD (CHRONIC KIDNEY DISEASE), STAGE II: ICD-10-CM

## 2021-01-21 DIAGNOSIS — I15.8 OTHER SECONDARY HYPERTENSION: ICD-10-CM

## 2021-01-21 LAB
ALBUMIN SERPL-MCNC: 4.4 G/DL (ref 3.4–5)
ALBUMIN UR-MCNC: 30 MG/DL
ANION GAP SERPL CALCULATED.3IONS-SCNC: 4 MMOL/L (ref 3–14)
APPEARANCE UR: CLEAR
BILIRUB UR QL STRIP: NEGATIVE
BUN SERPL-MCNC: 21 MG/DL (ref 7–30)
CALCIUM SERPL-MCNC: 9.7 MG/DL (ref 8.5–10.1)
CHLORIDE SERPL-SCNC: 104 MMOL/L (ref 94–109)
CO2 SERPL-SCNC: 30 MMOL/L (ref 20–32)
COLOR UR AUTO: ABNORMAL
CREAT SERPL-MCNC: 1.4 MG/DL (ref 0.66–1.25)
CREAT UR-MCNC: 76 MG/DL
ERYTHROCYTE [DISTWIDTH] IN BLOOD BY AUTOMATED COUNT: 11.8 % (ref 10–15)
GFR SERPL CREATININE-BSD FRML MDRD: 70 ML/MIN/{1.73_M2}
GLUCOSE SERPL-MCNC: 92 MG/DL (ref 70–99)
GLUCOSE UR STRIP-MCNC: NEGATIVE MG/DL
HCT VFR BLD AUTO: 48.8 % (ref 40–53)
HGB BLD-MCNC: 16.2 G/DL (ref 13.3–17.7)
HGB UR QL STRIP: NEGATIVE
KETONES UR STRIP-MCNC: NEGATIVE MG/DL
LEUKOCYTE ESTERASE UR QL STRIP: NEGATIVE
MCH RBC QN AUTO: 29.1 PG (ref 26.5–33)
MCHC RBC AUTO-ENTMCNC: 33.2 G/DL (ref 31.5–36.5)
MCV RBC AUTO: 88 FL (ref 78–100)
MUCOUS THREADS #/AREA URNS LPF: PRESENT /LPF
NITRATE UR QL: NEGATIVE
PH UR STRIP: 6 PH (ref 5–7)
PHOSPHATE SERPL-MCNC: 3 MG/DL (ref 2.5–4.5)
PLATELET # BLD AUTO: 263 10E9/L (ref 150–450)
POTASSIUM SERPL-SCNC: 4.1 MMOL/L (ref 3.4–5.3)
PROT UR-MCNC: 0.37 G/L
PROT/CREAT 24H UR: 0.48 G/G CR (ref 0–0.2)
RBC # BLD AUTO: 5.56 10E12/L (ref 4.4–5.9)
RBC #/AREA URNS AUTO: <1 /HPF (ref 0–2)
SODIUM SERPL-SCNC: 138 MMOL/L (ref 133–144)
SOURCE: ABNORMAL
SP GR UR STRIP: 1.01 (ref 1–1.03)
UROBILINOGEN UR STRIP-MCNC: 0 MG/DL (ref 0–2)
WBC # BLD AUTO: 6.8 10E9/L (ref 4–11)
WBC #/AREA URNS AUTO: 1 /HPF (ref 0–5)

## 2021-01-21 PROCEDURE — 99213 OFFICE O/P EST LOW 20 MIN: CPT | Mod: GC | Performed by: INTERNAL MEDICINE

## 2021-01-21 PROCEDURE — G0463 HOSPITAL OUTPT CLINIC VISIT: HCPCS

## 2021-01-21 PROCEDURE — 84156 ASSAY OF PROTEIN URINE: CPT | Performed by: PATHOLOGY

## 2021-01-21 PROCEDURE — 81001 URINALYSIS AUTO W/SCOPE: CPT | Performed by: PATHOLOGY

## 2021-01-21 PROCEDURE — 85027 COMPLETE CBC AUTOMATED: CPT | Performed by: PATHOLOGY

## 2021-01-21 PROCEDURE — 80069 RENAL FUNCTION PANEL: CPT | Performed by: PATHOLOGY

## 2021-01-21 PROCEDURE — 36415 COLL VENOUS BLD VENIPUNCTURE: CPT | Performed by: PATHOLOGY

## 2021-01-21 RX ORDER — LISINOPRIL 40 MG/1
40 TABLET ORAL DAILY
Qty: 90 TABLET | Refills: 3 | Status: SHIPPED | OUTPATIENT
Start: 2021-01-21 | End: 2022-01-24

## 2021-01-21 ASSESSMENT — MIFFLIN-ST. JEOR: SCORE: 2012.33

## 2021-01-21 ASSESSMENT — PAIN SCALES - GENERAL: PAINLEVEL: NO PAIN (0)

## 2021-01-21 NOTE — PROGRESS NOTES
NEPHROLOGY CLINIC VISIT       HPI    Solomon Wilcox is a 19 year old male with a history of ARPKD, CKD stage II, hypertension who established care with adult nephrology clinic on 2/3/2020.  Prior to that patient was following with Dr. Ni in pediatric nephrology clinic and was last seen by Dr. Ni on 2019.    He was first diagnosed with ARPKD around the time of his birth. Both the mother and father are carriers of the disease. His mother notes her pregnancy with the patient was complicated with an emergency  and a pneumothorax  Liver cysts were discovered at the age of 1. Since then, his kidney function has been around 70% and has remained no acute stable.     Patient has a history of gallbladder removal and had an averse reaction to the anesthetic. He currently attends college at [employer] and is studying environmental studies. Since then he has been doing well with no hospitalizations and no surgeries. Patient was very active with sports in high school.    History of kidney stones: No  History of NSAID use: No  Family history of kidney disease: Both mother and father are carriers of ARPKD    During 8/10/2020 visit, dose of lisinopril was increased to 30 mg daily.  Hydrochlorothiazide was discontinued    2021,Interval history:    Doing well   No new concerns   Particularly no  symptoms , no CP/SOB,leg swelling  No cough  No abd pain or flank pain  He is watching his diet more and cooking more.   He graduatred from [employer] with environmental science major, current working in IT . 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     Liver disease, cystic, congenital      Short stature     Treated with growth hormone     Unspecified hypertensive kidney disease with chronic kidney disease stage I through stage IV, or unspecified(403.90)        Past Surgical History:   Procedure Laterality Date     LAPAROSCOPIC CHOLECYSTECTOMY N/A 10/24/2014    Procedure: LAPAROSCOPIC CHOLECYSTECTOMY;  Surgeon: David Gibson MD;  Location: [facility] OR     NOSE SURGERY          MEDICATIONS:  Current Outpatient Medications   Medication     cyanocolbalamin (VITAMIN  B-12) 500 MCG tablet     lisinopril (ZESTRIL) 40 MG tablet     ursodiol (ACTIGALL) 300 MG capsule     adapalene (DIFFERIN) 0.1 % cream     cholecalciferol (VITAMIN  -D) 1000 UNITS capsule     guanFACINE HCl (INTUNIV) 3 MG TB24     No current facility-administered medications for this visit.      Patient confirmed that he is no more taking Celexa or Risperdal --> this was removed from his medication list      ALLERGIES:    Allergies   Allergen Reactions     No Clinical Screening - See Comments      Pt with hx of polycystic kidney disease  Please avoid ibuprofen       PHYSICAL EXAM:        BP (!) 147/92   Pulse 123   Temp 97.9  F (36.6  C) (Oral)   Ht 1.778 m (5' 10\")   Wt 100.6 kg (221 lb 12.8 oz)   SpO2 97%   BMI 31.82 kg/m     [unfilled]   Admit Weight: 100.6 kg (221 lb 12.8 oz)     GENERAL APPEARANCE:no distress, he is  awake  EYES: no scleral icterus, pupils equal  Endo: no goiter, no moon facies  Lymphatics: no cervical or supraclavicular LAD  Pulmonary: lungs clear to auscultation with equal breath sounds bilaterally, no clubbing  CV: regular rhythm, normal rate, no rub   - JVD  none   - Edema none   GI: soft, nontender, normal bowel sounds  MS: no " evidence of inflammation in joints, no muscle tenderness  : no stover  SKIN: no rash, warm, dry, no cyanosis  NEURO: face symmetric,no asterixis       LABS:   CMP@LABRCNTIPR(na:4,potassium:4,chloride:4,co2:4,aniongap:4,g,bun:4,cr:4,gfrestimated:4,gfrestblack:4,alexys:4,ma,phos:4,prottotal:4,albumin:4,bilitotal:4,alkphos:4,ast:4,alt:4)@  CBC@LABRCNTIPR(hgb:4,wbc:4,rbc:4,hct:4,mcv:4,mch:4,mchc:4,rdw:4,plt:4)@  INR@LABRCNTIPR(inr:4,ptt:4)@  ABG@LABRCNTIPR(ph:4,pco2:4,po2:4,hco3:4,baseexcess:4,o2per:4)@   URINE STUDIES  Recent Labs   Lab Test 21  1305 20  1532 20  1022 20  1020   COLOR Straw Straw Straw Yellow   APPEARANCE Clear Clear Clear Clear   URINEGLC Negative Negative Negative Negative   URINEBILI Negative Negative Negative Negative   URINEKETONE Negative Negative Negative Negative   SG 1.010 1.009 1.010 1.011   UBLD Negative Negative Negative Negative   URINEPH 6.0 7.0 6.0 6.0   PROTEIN 30* Negative 30* 100*   NITRITE Negative Negative Negative Negative   LEUKEST Negative Negative Trace* Small*   RBCU <1 <1 1 1   WBCU 1 1 4 7*     Recent Labs   Lab Test 21  1305 20  1532 20  1022 20  1020 14  1515 13  0825   UTPG 0.48* 0.46* 0.41* 0.51* 0.10 0.13     PTH  Recent Labs   Lab Test 20  1527 19  0902 19  1012 18  0840 17  1207 08/10/17  0850 16  1410 16  1115 16  0809 02/19/15  1600 13  0825   PTHI 24 7* 35 10* <3* 8* <3* 19 36 54 41     IRON STUDIES  Recent Labs   Lab Test 19  0902 19  1012 17  1207   ZEE 82 47 13*       IMAGING:  All imaging studies reviewed by me.     Ej Lofton MD        ASSESSMENT AND PLAN    Autosomal recessive polycystic kidney disease   CKD stage II  Hypertension:  Uncontrolled . Target  . On presentation bp 147 /92 Repeat /86   Volume assessment: euvolemic    Creatinine, 2020: 1.44 --> 1.40 today with gfr 70  Baseline creatinine:  1.4-1.64  UA: mild protein 30 mg/dL ,wbc, rbc normal  Electrolytes were normal.  UPCR: 0.46 ( 8/7/20) --> 0.48 ( 1/21/21)   Abdomen US with Doppler , 7/25/2019: Polycystic kidney disease.  Intrahepatic ductal dilatation.  Hemoglobin 16.2  BMD: On vitamin D 1000 units daily, vitamin D, dated 8/7/2020: 51 . PTH 24 on 8/7/2020    Mood disorder: Patient confirmed that he has stopped taking Risperdal, Celexa  Hepatic fibrosis: Follows with Dr. Goss      RECOMMENDATIONS  Increase Lisinopril from 20 mg to 40 mg daily.  New prescription sent to his pharmacy  He was advised to eat a low-salt diet.  Target less than 2 g of sodium per day  He will be buying a BP monitoring kit today and he will start monitoring his blood pressure and heart rate regularly.  We will send my chart message with the readings  We ordered a renal panel which he will get done with the labs ordered by his liver specialist.      Follow up in 6 MONTHS with Dr FRANCISCO  Will repeat renal panel in 2-3 weeks when he does labs for his liver clinic appointment    Patient verbalized understanding of what was discussed with him  and agreed to follow up with the recommendations     Patient staffed with  Dr Tavares Pagan MD, FACP  Nephrology Fellow   [facility]   Pager [phone]

## 2021-01-21 NOTE — LETTER
2021       RE: Solomon Wilcox  [address]  [facility] [address]     Dear Colleague,    Thank you for referring your patient, Solomon Wilcox, to the [facility] NEPHROLOGY CLINIC [place] at [facility]. Please see a copy of my visit note below.      NEPHROLOGY CLINIC VISIT       HPI    Solomon Wilcox is a 19 year old male with a history of ARPKD, CKD stage II, hypertension who established care with adult nephrology clinic on 2/3/2020.  Prior to that patient was following with Dr. Ni in pediatric nephrology clinic and was last seen by Dr. Ni on 2019.    He was first diagnosed with ARPKD around the time of his birth. Both the mother and father are carriers of the disease. His mother notes her pregnancy with the patient was complicated with an emergency  and a pneumothorax  Liver cysts were discovered at the age of 1. Since then, his kidney function has been around 70% and has remained no acute stable.     Patient has a history of gallbladder removal and had an averse reaction to the anesthetic. He currently attends college at [employer] and is studying environmental studies. Since then he has been doing well with no hospitalizations and no surgeries. Patient was very active with sports in high school.    History of kidney stones: No  History of NSAID use: No  Family history of kidney disease: Both mother and father are carriers of ARPKD    During 8/10/2020 visit, dose of lisinopril was increased to 30 mg daily.  Hydrochlorothiazide was discontinued    2021,Interval history:    Doing well   No new concerns   Particularly no  symptoms , no CP/SOB,leg swelling  No cough  No abd pain or flank pain  He is watching his diet more and cooking more.   He graduatred from [employer] with environmental science major, current working in IT . Plans to do graduate studies in the future  He is doing regular exercise but does not  "check vitals     Wt Readings from Last 3 Encounters:   01/21/21 100.6 kg (221 lb 12.8 oz)   02/03/20 105.3 kg (232 lb 3.2 oz)   08/09/19 106.1 kg (233 lb 14.5 oz)     REVIEW OF SYSTEM: 10 point review of system unremarkable    PAST MEDICAL HISTORY:  Reviewed with patient on 01/21/2021     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, 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     Liver disease, cystic, congenital      Short stature     Treated with growth hormone     Unspecified hypertensive kidney disease with chronic kidney disease stage I through stage IV, or unspecified(403.90)        Past Surgical History:   Procedure Laterality Date     LAPAROSCOPIC CHOLECYSTECTOMY N/A 10/24/2014    Procedure: LAPAROSCOPIC CHOLECYSTECTOMY;  Surgeon: David Gibson MD;  Location: [facility] OR     NOSE SURGERY          MEDICATIONS:  Current Outpatient Medications   Medication     cyanocolbalamin (VITAMIN  B-12) 500 MCG tablet     lisinopril (ZESTRIL) 40 MG tablet     ursodiol (ACTIGALL) 300 MG capsule     adapalene (DIFFERIN) 0.1 % cream     cholecalciferol (VITAMIN  -D) 1000 UNITS capsule     guanFACINE HCl (INTUNIV) 3 MG TB24     No current facility-administered medications for this visit.      Patient confirmed that he is no more taking Celexa or Risperdal --> this was removed from his medication list      ALLERGIES:    Allergies   Allergen Reactions     No Clinical Screening - See Comments      Pt with hx of polycystic kidney disease  Please avoid ibuprofen       PHYSICAL EXAM:        BP (!) 147/92   Pulse 123   Temp 97.9  F (36.6  C) (Oral)   Ht 1.778 m (5' 10\")   Wt 100.6 kg (221 lb 12.8 oz)   SpO2 97%   BMI 31.82 kg/m     [unfilled]   Admit Weight: 100.6 kg (221 lb 12.8 oz)     GENERAL APPEARANCE:no distress, he is  awake  EYES: no scleral " icterus, pupils equal  Endo: no goiter, no moon facies  Lymphatics: no cervical or supraclavicular LAD  Pulmonary: lungs clear to auscultation with equal breath sounds bilaterally, no clubbing  CV: regular rhythm, normal rate, no rub   - JVD  none   - Edema none   GI: soft, nontender, normal bowel sounds  MS: no evidence of inflammation in joints, no muscle tenderness  : no stover  SKIN: no rash, warm, dry, no cyanosis  NEURO: face symmetric,no asterixis       LABS:   CMP@LABRCNTIPR(na:4,potassium:4,chloride:4,co2:4,aniongap:4,g,bun:4,cr:4,gfrestimated:4,gfrestblack:4,alexys:4,ma,phos:4,prottotal:4,albumin:4,bilitotal:4,alkphos:4,ast:4,alt:4)@  CBC@LABRCNTIPR(hgb:4,wbc:4,rbc:4,hct:4,mcv:4,mch:4,mchc:4,rdw:4,plt:4)@  INR@LABRCNTIPR(inr:4,ptt:4)@  ABG@LABRCNTIPR(ph:4,pco2:4,po2:4,hco3:4,baseexcess:4,o2per:4)@   URINE STUDIES  Recent Labs   Lab Test 21  1305 20  1532 20  1022 20  1020   COLOR Straw Straw Straw Yellow   APPEARANCE Clear Clear Clear Clear   URINEGLC Negative Negative Negative Negative   URINEBILI Negative Negative Negative Negative   URINEKETONE Negative Negative Negative Negative   SG 1.010 1.009 1.010 1.011   UBLD Negative Negative Negative Negative   URINEPH 6.0 7.0 6.0 6.0   PROTEIN 30* Negative 30* 100*   NITRITE Negative Negative Negative Negative   LEUKEST Negative Negative Trace* Small*   RBCU <1 <1 1 1   WBCU 1 1 4 7*     Recent Labs   Lab Test 21  1305 20  1532 20  1022 20  1020 14  1515 13  0825   UTPG 0.48* 0.46* 0.41* 0.51* 0.10 0.13     PTH  Recent Labs   Lab Test 20  1527 19  0902 19  1012 18  0840 17  1207 08/10/17  0850 16  1410 16  1115 16  0809 02/19/15  1600 13  0825   PTHI 24 7* 35 10* <3* 8* <3* 19 36 54 41     IRON STUDIES  Recent Labs   Lab Test 19  0902 19  1012 17  1207   ZEE 82 47 13*       IMAGING:  All imaging studies reviewed by me.      Ej Lofton MD        ASSESSMENT AND PLAN    Autosomal recessive polycystic kidney disease   CKD stage II  Hypertension:  Uncontrolled . Target  . On presentation bp 147 /92 Repeat /86   Volume assessment: euvolemic    Creatinine, 8/31/2020: 1.44 --> 1.40 today with gfr 70  Baseline creatinine: 1.4-1.64  UA: mild protein 30 mg/dL ,wbc, rbc normal  Electrolytes were normal.  UPCR: 0.46 ( 8/7/20) --> 0.48 ( 1/21/21)   Abdomen US with Doppler , 7/25/2019: Polycystic kidney disease.  Intrahepatic ductal dilatation.  Hemoglobin 16.2  BMD: On vitamin D 1000 units daily, vitamin D, dated 8/7/2020: 51 . PTH 24 on 8/7/2020    Mood disorder: Patient confirmed that he has stopped taking Risperdal, Celexa  Hepatic fibrosis: Follows with Dr. Goss      RECOMMENDATIONS  Increase Lisinopril from 20 mg to 40 mg daily.  New prescription sent to his pharmacy  He was advised to eat a low-salt diet.  Target less than 2 g of sodium per day  He will be buying a BP monitoring kit today and he will start monitoring his blood pressure and heart rate regularly.  We will send my chart message with the readings  We ordered a renal panel which he will get done with the labs ordered by his liver specialist.      Follow up in 6 MONTHS with Dr FRANCISCO  Will repeat renal panel in 2-3 weeks when he does labs for his liver clinic appointment    Patient verbalized understanding of what was discussed with him  and agreed to follow up with the recommendations     Patient staffed with  Dr Tavares Pagan MD, FACP  Nephrology Fellow   [facility]   Pager [phone]      Again, thank you for allowing me to participate in the care of your patient.      Sincerely,    Jayla Francisco MD

## 2021-01-21 NOTE — LETTER
2021      RE: Solomon Wilcox  [address] [facility] [address]         NEPHROLOGY CLINIC VISIT       HPI    Solomon Wilcox is a 19 year old male with a history of ARPKD, CKD stage II, hypertension who established care with adult nephrology clinic on 2/3/2020.  Prior to that patient was following with Dr. Ni in pediatric nephrology clinic and was last seen by Dr. Ni on 2019.    He was first diagnosed with ARPKD around the time of his birth. Both the mother and father are carriers of the disease. His mother notes her pregnancy with the patient was complicated with an emergency  and a pneumothorax  Liver cysts were discovered at the age of 1. Since then, his kidney function has been around 70% and has remained no acute stable.     Patient has a history of gallbladder removal and had an averse reaction to the anesthetic. He currently attends college at [employer] and is studying environmental studies. Since then he has been doing well with no hospitalizations and no surgeries. Patient was very active with sports in high school.    History of kidney stones: No  History of NSAID use: No  Family history of kidney disease: Both mother and father are carriers of ARPKD    During 8/10/2020 visit, dose of lisinopril was increased to 30 mg daily.  Hydrochlorothiazide was discontinued    2021,Interval history:    Doing well   No new concerns   Particularly no  symptoms , no CP/SOB,leg swelling  No cough  No abd pain or flank pain  He is watching his diet more and cooking more.   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 Plans to do graduate studies in the future  He is doing regular exercise but does not check vitals     Wt Readings from Last 3 Encounters:   21 100.6 kg (221 lb 12.8 oz)   20 105.3 kg (232 lb 3.2 oz)   19 106.1 kg (233 lb 14.5 oz)     REVIEW OF SYSTEM: 10 point review of system  "unremarkable    PAST MEDICAL HISTORY:  Reviewed with patient on 01/21/2021     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, 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     Liver disease, cystic, congenital      Short stature     Treated with growth hormone     Unspecified hypertensive kidney disease with chronic kidney disease stage I through stage IV, or unspecified(403.90)        Past Surgical History:   Procedure Laterality Date     LAPAROSCOPIC CHOLECYSTECTOMY N/A 10/24/2014    Procedure: LAPAROSCOPIC CHOLECYSTECTOMY;  Surgeon: David Gibson MD;  Location: [facility] OR     NOSE SURGERY          MEDICATIONS:  Current Outpatient Medications   Medication     cyanocolbalamin (VITAMIN  B-12) 500 MCG tablet     lisinopril (ZESTRIL) 40 MG tablet     ursodiol (ACTIGALL) 300 MG capsule     adapalene (DIFFERIN) 0.1 % cream     cholecalciferol (VITAMIN  -D) 1000 UNITS capsule     guanFACINE HCl (INTUNIV) 3 MG TB24     No current facility-administered medications for this visit.      Patient confirmed that he is no more taking Celexa or Risperdal --> this was removed from his medication list      ALLERGIES:    Allergies   Allergen Reactions     No Clinical Screening - See Comments      Pt with hx of polycystic kidney disease  Please avoid ibuprofen       PHYSICAL EXAM:        BP (!) 147/92   Pulse 123   Temp 97.9  F (36.6  C) (Oral)   Ht 1.778 m (5' 10\")   Wt 100.6 kg (221 lb 12.8 oz)   SpO2 97%   BMI 31.82 kg/m     [unfilled]   Admit Weight: 100.6 kg (221 lb 12.8 oz)     GENERAL APPEARANCE:no distress, he is  awake  EYES: no scleral icterus, pupils equal  Endo: no goiter, no moon facies  Lymphatics: no cervical or supraclavicular LAD  Pulmonary: lungs clear to auscultation with equal breath sounds bilaterally, no clubbing  CV: regular rhythm, " normal rate, no rub   - JVD  none   - Edema none   GI: soft, nontender, normal bowel sounds  MS: no evidence of inflammation in joints, no muscle tenderness  : no stover  SKIN: no rash, warm, dry, no cyanosis  NEURO: face symmetric,no asterixis       LABS:   CMP@LABRCNTIPR(na:4,potassium:4,chloride:4,co2:4,aniongap:4,g,bun:4,cr:4,gfrestimated:4,gfrestblack:4,alexys:4,ma,phos:4,prottotal:4,albumin:4,bilitotal:4,alkphos:4,ast:4,alt:4)@  CBC@LABRCNTIPR(hgb:4,wbc:4,rbc:4,hct:4,mcv:4,mch:4,mchc:4,rdw:4,plt:4)@  INR@LABRCNTIPR(inr:4,ptt:4)@  ABG@LABRCNTIPR(ph:4,pco2:4,po2:4,hco3:4,baseexcess:4,o2per:4)@   URINE STUDIES  Recent Labs   Lab Test 21  1305 20  1532 20  1022 20  1020   COLOR Straw Straw Straw Yellow   APPEARANCE Clear Clear Clear Clear   URINEGLC Negative Negative Negative Negative   URINEBILI Negative Negative Negative Negative   URINEKETONE Negative Negative Negative Negative   SG 1.010 1.009 1.010 1.011   UBLD Negative Negative Negative Negative   URINEPH 6.0 7.0 6.0 6.0   PROTEIN 30* Negative 30* 100*   NITRITE Negative Negative Negative Negative   LEUKEST Negative Negative Trace* Small*   RBCU <1 <1 1 1   WBCU 1 1 4 7*     Recent Labs   Lab Test 21  1305 20  1532 20  1022 20  1020 14  1515 13  0825   UTPG 0.48* 0.46* 0.41* 0.51* 0.10 0.13     PTH  Recent Labs   Lab Test 20  1527 19  0902 19  1012 18  0840 17  1207 08/10/17  0850 16  1410 16  1115 16  0809 02/19/15  1600 13  0825   PTHI 24 7* 35 10* <3* 8* <3* 19 36 54 41     IRON STUDIES  Recent Labs   Lab Test 19  0902 19  1012 17  1207   ZEE 82 47 13*       IMAGING:  All imaging studies reviewed by me.     Ej Lofton MD        ASSESSMENT AND PLAN    Autosomal recessive polycystic kidney disease   CKD stage II  Hypertension:  Uncontrolled . Target  . On presentation bp 147 /92 Repeat /86   Volume  assessment: euvolemic    Creatinine, 8/31/2020: 1.44 --> 1.40 today with gfr 70  Baseline creatinine: 1.4-1.64  UA: mild protein 30 mg/dL ,wbc, rbc normal  Electrolytes were normal.  UPCR: 0.46 ( 8/7/20) --> 0.48 ( 1/21/21)   Abdomen US with Doppler , 7/25/2019: Polycystic kidney disease.  Intrahepatic ductal dilatation.  Hemoglobin 16.2  BMD: On vitamin D 1000 units daily, vitamin D, dated 8/7/2020: 51 . PTH 24 on 8/7/2020    Mood disorder: Patient confirmed that he has stopped taking Risperdal, Celexa  Hepatic fibrosis: Follows with Dr. Goss      RECOMMENDATIONS  Increase Lisinopril from 20 mg to 40 mg daily.  New prescription sent to his pharmacy  He was advised to eat a low-salt diet.  Target less than 2 g of sodium per day  He will be buying a BP monitoring kit today and he will start monitoring his blood pressure and heart rate regularly.  We will send my chart message with the readings  We ordered a renal panel which he will get done with the labs ordered by his liver specialist.      Follow up in 6 MONTHS with Dr CHAPIN  Will repeat renal panel in 2-3 weeks when he does labs for his liver clinic appointment    Patient verbalized understanding of what was discussed with him  and agreed to follow up with the recommendations     Patient staffed with  Dr Tavares Pagan MD, FACP  Nephrology Fellow   [facility]   Pager [phone]        Jayla Chapin MD

## 2021-01-22 ENCOUNTER — TELEPHONE (OUTPATIENT)
Dept: NEPHROLOGY | Facility: CLINIC | Age: 21
End: 2021-01-22

## 2021-02-08 ENCOUNTER — OFFICE VISIT (OUTPATIENT)
Dept: GASTROENTEROLOGY | Facility: CLINIC | Age: 21
End: 2021-02-08
Attending: INTERNAL MEDICINE
Payer: COMMERCIAL

## 2021-02-08 VITALS
WEIGHT: 223.7 LBS | DIASTOLIC BLOOD PRESSURE: 94 MMHG | OXYGEN SATURATION: 98 % | TEMPERATURE: 98.2 F | RESPIRATION RATE: 18 BRPM | BODY MASS INDEX: 32.03 KG/M2 | HEIGHT: 70 IN | SYSTOLIC BLOOD PRESSURE: 147 MMHG | HEART RATE: 61 BPM

## 2021-02-08 DIAGNOSIS — Q44.5 CAROLI DISEASE: ICD-10-CM

## 2021-02-08 LAB
ALBUMIN SERPL-MCNC: 4.2 G/DL (ref 3.4–5)
ALP SERPL-CCNC: 68 U/L (ref 40–150)
ALT SERPL W P-5'-P-CCNC: 31 U/L (ref 0–70)
ANION GAP SERPL CALCULATED.3IONS-SCNC: 6 MMOL/L (ref 3–14)
AST SERPL W P-5'-P-CCNC: 13 U/L (ref 0–45)
BILIRUB DIRECT SERPL-MCNC: 0.2 MG/DL (ref 0–0.2)
BILIRUB SERPL-MCNC: 0.8 MG/DL (ref 0.2–1.3)
BUN SERPL-MCNC: 16 MG/DL (ref 7–30)
CALCIUM SERPL-MCNC: 9.4 MG/DL (ref 8.5–10.1)
CHLORIDE SERPL-SCNC: 107 MMOL/L (ref 94–109)
CO2 SERPL-SCNC: 28 MMOL/L (ref 20–32)
CREAT SERPL-MCNC: 1.25 MG/DL (ref 0.66–1.25)
GFR SERPL CREATININE-BSD FRML MDRD: 81 ML/MIN/{1.73_M2}
GLUCOSE SERPL-MCNC: 85 MG/DL (ref 70–99)
INR PPP: 1.02 (ref 0.86–1.14)
POTASSIUM SERPL-SCNC: 3.7 MMOL/L (ref 3.4–5.3)
PROT SERPL-MCNC: 8 G/DL (ref 6.8–8.8)
SODIUM SERPL-SCNC: 142 MMOL/L (ref 133–144)

## 2021-02-08 PROCEDURE — 36415 COLL VENOUS BLD VENIPUNCTURE: CPT | Performed by: PATHOLOGY

## 2021-02-08 PROCEDURE — 80048 BASIC METABOLIC PNL TOTAL CA: CPT | Performed by: PATHOLOGY

## 2021-02-08 PROCEDURE — 99214 OFFICE O/P EST MOD 30 MIN: CPT | Mod: GC | Performed by: INTERNAL MEDICINE

## 2021-02-08 PROCEDURE — 85610 PROTHROMBIN TIME: CPT | Performed by: PATHOLOGY

## 2021-02-08 PROCEDURE — 80076 HEPATIC FUNCTION PANEL: CPT | Performed by: PATHOLOGY

## 2021-02-08 PROCEDURE — G0463 HOSPITAL OUTPT CLINIC VISIT: HCPCS

## 2021-02-08 ASSESSMENT — MIFFLIN-ST. JEOR: SCORE: 2020.95

## 2021-02-08 ASSESSMENT — PAIN SCALES - GENERAL: PAINLEVEL: NO PAIN (0)

## 2021-02-08 NOTE — LETTER
"    2/8/2021         RE: Solomon Wilcox  [address]  [facility] [address]        Dear Colleague,    Thank you for referring your patient, Solomon Wilcox, to the [facility] HEPATOLOGY CLINIC [place]. Please see a copy of my visit note below.      Hepatology Note  02/08/2021   Subjective:    Solomon Wilcox is a 20 year old year old male, with h/o Caroli disease with congenital hepatic fibrosis, autosomal recessive polycystic kidney disease and congenital hepatic fibrosis beginning at the age 1, cholecystectomy (10th grade), presenting for 1 year follow up.    He was diagnosed with both autosomal recessive polycystic kidney disease and congenital hepatic fibrosis beginning at the age 1.  He has done remarkably well since that time.  He still has not required kidney transplantation or hemodialysis.  He has had no complications of his congenital hepatic fibrosis.  In fact, he has essentially no evidence of any portal hypertension.      Since his last appointment 1 year ago, he stated that he has been eating well. He denies any abdominal pain, itching or skin rash or fatigue.  He denies any increased abdominal girth or lower extremity edema.  He denies any fevers or chills, cough or shortness of breath.  He denies any nausea, vomiting, diarrhea or constipation, bloody stools.  His appetite has been good.  His weight has largely remained the same. He states that his nephrologist Dr. Chapin has increased his PTA lisinopril from 30 mg to 40 mg 2 weeks ago due to his HTN. No other changes in his medications. He enjoys snowboarding between the hours of 9pm-3AM, one way to get out during COVID.    ROS:  Negative unless specified in above HPI    Physical Exam:   BP (!) 147/94 (BP Location: Left arm, Patient Position: Sitting, Cuff Size: Adult Large)   Pulse 61   Temp 98.2  F (36.8  C) (Oral)   Resp 18   Ht 1.778 m (5' 10\")   Wt 101.5 kg (223 lb 11.2 oz)   SpO2 98%   BMI 32.10 kg/m      GENERAL " APPEARANCE: NAD.  EYES: no scleral icterus, pupils equal  HENT: mouth without ulcers or lesions  PULM: CTAB, equal air movement, no clubbing  CV: regular rhythm, normal rate, no rub  GI: soft, non-tender, non-distended, BS+  INTEGUMENT: no cyanosis, no rash  NEURO:  no asterixis     Labs:   All labs reviewed by me  Electrolytes/Renal -   Recent Labs   Lab Test 01/21/21  1303 08/31/20  1432 08/07/20  1527 02/03/20  1003 02/03/20  1003 10/25/14  0407 10/25/14  0407 10/24/14  2225 10/24/14  1235    140 140   < > 137   < >  --  135 141   POTASSIUM 4.1 4.4 3.4   < > 3.6   < >  --  3.8 4.0   CHLORIDE 104 109 106   < > 105   < >  --  101 107   CO2 30 27 28   < > 25   < >  --  26 28   BUN 21 19 19   < > 23   < >  --  18 20   CR 1.40* 1.44* 1.40*   < > 1.64*   < >  --  1.26* 1.20*   GLC 92 85 87   < > 99   < >  --  118* 89   PITO 9.7 9.2 9.4   < > 10.0   < >  --  8.9* 9.3   MAG  --   --   --   --   --   --  2.0 1.2* 1.4*   PHOS 3.0  --  2.9  --  2.8   < >  --  3.4  --     < > = values in this interval not displayed.     Last Comprehensive Metabolic Panel:  Sodium   Date Value Ref Range Status   02/08/2021 142 133 - 144 mmol/L Final     Potassium   Date Value Ref Range Status   02/08/2021 3.7 3.4 - 5.3 mmol/L Final     Chloride   Date Value Ref Range Status   02/08/2021 107 94 - 109 mmol/L Final     Carbon Dioxide   Date Value Ref Range Status   02/08/2021 28 20 - 32 mmol/L Final     Anion Gap   Date Value Ref Range Status   02/08/2021 6 3 - 14 mmol/L Final     Glucose   Date Value Ref Range Status   02/08/2021 85 70 - 99 mg/dL Final     Urea Nitrogen   Date Value Ref Range Status   02/08/2021 16 7 - 30 mg/dL Final     Creatinine   Date Value Ref Range Status   02/08/2021 1.25 0.66 - 1.25 mg/dL Final     GFR Estimate   Date Value Ref Range Status   02/08/2021 81 >60 mL/min/[1.73_m2] Final     Comment:     Non  GFR Calc  Starting 12/18/2018, serum creatinine based estimated GFR (eGFR) will be   calculated  using the Chronic Kidney Disease Epidemiology Collaboration   (CKD-EPI) equation.       Calcium   Date Value Ref Range Status   02/08/2021 9.4 8.5 - 10.1 mg/dL Final       CBC -   Recent Labs   Lab Test 01/21/21  1303 08/07/20  1527 02/03/20  1003   WBC 6.8 6.1 6.5   HGB 16.2 15.2 17.1    264 297       LFTs -   Recent Labs   Lab Test 01/21/21  1303 08/07/20  1527 05/05/20  1021 02/03/20  1003 07/25/19  0902   ALKPHOS  --   --  53 70 78   BILITOTAL  --   --  0.5 0.8 0.3   ALT  --   --  37 31 63   AST  --   --  18 11 26   PROTTOTAL  --   --  8.3 8.6 8.2   ALBUMIN 4.4 4.2 4.0 4.3 3.9       Liver Function Studies -   Recent Labs   Lab Test 02/08/21  1544   PROTTOTAL 8.0   ALBUMIN 4.2   BILITOTAL 0.8   ALKPHOS 68   AST 13   ALT 31       Iron Panel -   Recent Labs   Lab Test 07/25/19  0902   ZEE 82     INR   Date Value Ref Range Status   02/08/2021 1.02 0.86 - 1.14 Final        Current Medications:  Current Outpatient Medications   Medication     adapalene (DIFFERIN) 0.1 % cream     cholecalciferol (VITAMIN  -D) 1000 UNITS capsule     cyanocolbalamin (VITAMIN  B-12) 500 MCG tablet     guanFACINE HCl (INTUNIV) 3 MG TB24     lisinopril (ZESTRIL) 40 MG tablet     ursodiol (ACTIGALL) 300 MG capsule     No current facility-administered medications for this visit.        Assessment & Recommendations:   Solomon Wilcox is a 20 year old year old male, with h/o Caroli disease with congenital hepatic fibrosis, autosomal recessive polycystic kidney disease and congenital hepatic fibrosis beginning at the age 1, cholecystectomy (10th grade), presenting for 1 year follow up.    Caroli disease with congenital hepatic fibrosis, stable   He has several cysts that are fairly good size in his liver.  In past labs and imaging, his liver function was completely normal and there was no evidence of portal hypertension.  Specifically, he has a normal spleen size and normal platelet count, and antegrade flow through the portal vein. During  today's lab: BMP, INR, LFT all unremarkable. His Cr is 1.25, the best it has been since 2015.  - No need for yearly imaging of the abd.  - Perhaps in 5 years, repeat MRCP to make sure there is no significant change to the cysts  - F/U in 1 year    This patient was staffed and discussed with Dr. Goss.    Talisha De Souza MD  PGY2  IM resident  [phone]     The patient was seen and examined.  The above assessment and plan was developed jointly with the fellow.      Thank you very much for allowing me to participate in the care of this patient.  If you have any questions regarding my recommendations, please do not hesitate to contact me.         Lan Goss MD      Professor of Medicine  [employer]      Executive Medical Director, Solid Organ Transplant Program  [facility]

## 2021-02-08 NOTE — PROGRESS NOTES
"  Hepatology Note  02/08/2021   Subjective:    Solomon Wilcox is a 20 year old year old male, with h/o Caroli disease with congenital hepatic fibrosis, autosomal recessive polycystic kidney disease and congenital hepatic fibrosis beginning at the age 1, cholecystectomy (10th grade), presenting for 1 year follow up.    He was diagnosed with both autosomal recessive polycystic kidney disease and congenital hepatic fibrosis beginning at the age 1.  He has done remarkably well since that time.  He still has not required kidney transplantation or hemodialysis.  He has had no complications of his congenital hepatic fibrosis.  In fact, he has essentially no evidence of any portal hypertension.      Since his last appointment 1 year ago, he stated that he has been eating well. He denies any abdominal pain, itching or skin rash or fatigue.  He denies any increased abdominal girth or lower extremity edema.  He denies any fevers or chills, cough or shortness of breath.  He denies any nausea, vomiting, diarrhea or constipation, bloody stools.  His appetite has been good.  His weight has largely remained the same. He states that his nephrologist Dr. Chapin has increased his PTA lisinopril from 30 mg to 40 mg 2 weeks ago due to his HTN. No other changes in his medications. He enjoys snowboarding between the hours of 9pm-3AM, one way to get out during COVID.    ROS:  Negative unless specified in above HPI    Physical Exam:   BP (!) 147/94 (BP Location: Left arm, Patient Position: Sitting, Cuff Size: Adult Large)   Pulse 61   Temp 98.2  F (36.8  C) (Oral)   Resp 18   Ht 1.778 m (5' 10\")   Wt 101.5 kg (223 lb 11.2 oz)   SpO2 98%   BMI 32.10 kg/m      GENERAL APPEARANCE: NAD.  EYES: no scleral icterus, pupils equal  HENT: mouth without ulcers or lesions  PULM: CTAB, equal air movement, no clubbing  CV: regular rhythm, normal rate, no rub  GI: soft, non-tender, non-distended, BS+  INTEGUMENT: no cyanosis, no rash  NEURO:  no " asterixis     Labs:   All labs reviewed by me  Electrolytes/Renal -   Recent Labs   Lab Test 01/21/21  1303 08/31/20  1432 08/07/20  1527 02/03/20  1003 02/03/20  1003 10/25/14  0407 10/25/14  0407 10/24/14  2225 10/24/14  1235    140 140   < > 137   < >  --  135 141   POTASSIUM 4.1 4.4 3.4   < > 3.6   < >  --  3.8 4.0   CHLORIDE 104 109 106   < > 105   < >  --  101 107   CO2 30 27 28   < > 25   < >  --  26 28   BUN 21 19 19   < > 23   < >  --  18 20   CR 1.40* 1.44* 1.40*   < > 1.64*   < >  --  1.26* 1.20*   GLC 92 85 87   < > 99   < >  --  118* 89   PITO 9.7 9.2 9.4   < > 10.0   < >  --  8.9* 9.3   MAG  --   --   --   --   --   --  2.0 1.2* 1.4*   PHOS 3.0  --  2.9  --  2.8   < >  --  3.4  --     < > = values in this interval not displayed.     Last Comprehensive Metabolic Panel:  Sodium   Date Value Ref Range Status   02/08/2021 142 133 - 144 mmol/L Final     Potassium   Date Value Ref Range Status   02/08/2021 3.7 3.4 - 5.3 mmol/L Final     Chloride   Date Value Ref Range Status   02/08/2021 107 94 - 109 mmol/L Final     Carbon Dioxide   Date Value Ref Range Status   02/08/2021 28 20 - 32 mmol/L Final     Anion Gap   Date Value Ref Range Status   02/08/2021 6 3 - 14 mmol/L Final     Glucose   Date Value Ref Range Status   02/08/2021 85 70 - 99 mg/dL Final     Urea Nitrogen   Date Value Ref Range Status   02/08/2021 16 7 - 30 mg/dL Final     Creatinine   Date Value Ref Range Status   02/08/2021 1.25 0.66 - 1.25 mg/dL Final     GFR Estimate   Date Value Ref Range Status   02/08/2021 81 >60 mL/min/[1.73_m2] Final     Comment:     Non  GFR Calc  Starting 12/18/2018, serum creatinine based estimated GFR (eGFR) will be   calculated using the Chronic Kidney Disease Epidemiology Collaboration   (CKD-EPI) equation.       Calcium   Date Value Ref Range Status   02/08/2021 9.4 8.5 - 10.1 mg/dL Final       CBC -   Recent Labs   Lab Test 01/21/21  1303 08/07/20  1527 02/03/20  1003   WBC 6.8 6.1 6.5    HGB 16.2 15.2 17.1    264 297       LFTs -   Recent Labs   Lab Test 01/21/21  1303 08/07/20  1527 05/05/20  1021 02/03/20  1003 07/25/19  0902   ALKPHOS  --   --  53 70 78   BILITOTAL  --   --  0.5 0.8 0.3   ALT  --   --  37 31 63   AST  --   --  18 11 26   PROTTOTAL  --   --  8.3 8.6 8.2   ALBUMIN 4.4 4.2 4.0 4.3 3.9       Liver Function Studies -   Recent Labs   Lab Test 02/08/21  1544   PROTTOTAL 8.0   ALBUMIN 4.2   BILITOTAL 0.8   ALKPHOS 68   AST 13   ALT 31       Iron Panel -   Recent Labs   Lab Test 07/25/19  0902   ZEE 82     INR   Date Value Ref Range Status   02/08/2021 1.02 0.86 - 1.14 Final        Current Medications:  Current Outpatient Medications   Medication     adapalene (DIFFERIN) 0.1 % cream     cholecalciferol (VITAMIN  -D) 1000 UNITS capsule     cyanocolbalamin (VITAMIN  B-12) 500 MCG tablet     guanFACINE HCl (INTUNIV) 3 MG TB24     lisinopril (ZESTRIL) 40 MG tablet     ursodiol (ACTIGALL) 300 MG capsule     No current facility-administered medications for this visit.        Assessment & Recommendations:   Solomon Wilcox is a 20 year old year old male, with h/o Caroli disease with congenital hepatic fibrosis, autosomal recessive polycystic kidney disease and congenital hepatic fibrosis beginning at the age 1, cholecystectomy (10th grade), presenting for 1 year follow up.    Caroli disease with congenital hepatic fibrosis, stable   He has several cysts that are fairly good size in his liver.  In past labs and imaging, his liver function was completely normal and there was no evidence of portal hypertension.  Specifically, he has a normal spleen size and normal platelet count, and antegrade flow through the portal vein. During today's lab: BMP, INR, LFT all unremarkable. His Cr is 1.25, the best it has been since 2015.  - No need for yearly imaging of the abd.  - Perhaps in 5 years, repeat MRCP to make sure there is no significant change to the cysts  - F/U in 1 year    This patient was  staffed and discussed with Dr. Goss.    Talisha De Souza MD  PGY2  IM resident  [phone]     The patient was seen and examined.  The above assessment and plan was developed jointly with the fellow.      Thank you very much for allowing me to participate in the care of this patient.  If you have any questions regarding my recommendations, please do not hesitate to contact me.         Lan Goss MD      Professor of Medicine  [employer]      Executive Medical Director, Solid Organ Transplant Program  [facility]

## 2021-02-08 NOTE — LETTER
"    2/8/2021         RE: Solomon Wilcox  [address]  [facility] [address]        Hepatology Note  02/08/2021   Subjective:    Solomon Wilcox is a 20 year old year old male, with h/o Caroli disease with congenital hepatic fibrosis, autosomal recessive polycystic kidney disease and congenital hepatic fibrosis beginning at the age 1, cholecystectomy (10th grade), presenting for 1 year follow up.    He was diagnosed with both autosomal recessive polycystic kidney disease and congenital hepatic fibrosis beginning at the age 1.  He has done remarkably well since that time.  He still has not required kidney transplantation or hemodialysis.  He has had no complications of his congenital hepatic fibrosis.  In fact, he has essentially no evidence of any portal hypertension.      Since his last appointment 1 year ago, he stated that he has been eating well. He denies any abdominal pain, itching or skin rash or fatigue.  He denies any increased abdominal girth or lower extremity edema.  He denies any fevers or chills, cough or shortness of breath.  He denies any nausea, vomiting, diarrhea or constipation, bloody stools.  His appetite has been good.  His weight has largely remained the same. He states that his nephrologist Dr. Chapin has increased his PTA lisinopril from 30 mg to 40 mg 2 weeks ago due to his HTN. No other changes in his medications. He enjoys snowboarding between the hours of 9pm-3AM, one way to get out during COVID.    ROS:  Negative unless specified in above HPI    Physical Exam:   BP (!) 147/94 (BP Location: Left arm, Patient Position: Sitting, Cuff Size: Adult Large)   Pulse 61   Temp 98.2  F (36.8  C) (Oral)   Resp 18   Ht 1.778 m (5' 10\")   Wt 101.5 kg (223 lb 11.2 oz)   SpO2 98%   BMI 32.10 kg/m      GENERAL APPEARANCE: NAD.  EYES: no scleral icterus, pupils equal  HENT: mouth without ulcers or lesions  PULM: CTAB, equal air movement, no clubbing  CV: regular rhythm, normal rate, no " rub  GI: soft, non-tender, non-distended, BS+  INTEGUMENT: no cyanosis, no rash  NEURO:  no asterixis     Labs:   All labs reviewed by me  Electrolytes/Renal -   Recent Labs   Lab Test 01/21/21  1303 08/31/20  1432 08/07/20  1527 02/03/20  1003 02/03/20  1003 10/25/14  0407 10/25/14  0407 10/24/14  2225 10/24/14  1235    140 140   < > 137   < >  --  135 141   POTASSIUM 4.1 4.4 3.4   < > 3.6   < >  --  3.8 4.0   CHLORIDE 104 109 106   < > 105   < >  --  101 107   CO2 30 27 28   < > 25   < >  --  26 28   BUN 21 19 19   < > 23   < >  --  18 20   CR 1.40* 1.44* 1.40*   < > 1.64*   < >  --  1.26* 1.20*   GLC 92 85 87   < > 99   < >  --  118* 89   PITO 9.7 9.2 9.4   < > 10.0   < >  --  8.9* 9.3   MAG  --   --   --   --   --   --  2.0 1.2* 1.4*   PHOS 3.0  --  2.9  --  2.8   < >  --  3.4  --     < > = values in this interval not displayed.     Last Comprehensive Metabolic Panel:  Sodium   Date Value Ref Range Status   02/08/2021 142 133 - 144 mmol/L Final     Potassium   Date Value Ref Range Status   02/08/2021 3.7 3.4 - 5.3 mmol/L Final     Chloride   Date Value Ref Range Status   02/08/2021 107 94 - 109 mmol/L Final     Carbon Dioxide   Date Value Ref Range Status   02/08/2021 28 20 - 32 mmol/L Final     Anion Gap   Date Value Ref Range Status   02/08/2021 6 3 - 14 mmol/L Final     Glucose   Date Value Ref Range Status   02/08/2021 85 70 - 99 mg/dL Final     Urea Nitrogen   Date Value Ref Range Status   02/08/2021 16 7 - 30 mg/dL Final     Creatinine   Date Value Ref Range Status   02/08/2021 1.25 0.66 - 1.25 mg/dL Final     GFR Estimate   Date Value Ref Range Status   02/08/2021 81 >60 mL/min/[1.73_m2] Final     Comment:     Non  GFR Calc  Starting 12/18/2018, serum creatinine based estimated GFR (eGFR) will be   calculated using the Chronic Kidney Disease Epidemiology Collaboration   (CKD-EPI) equation.       Calcium   Date Value Ref Range Status   02/08/2021 9.4 8.5 - 10.1 mg/dL Final       CBC -    Recent Labs   Lab Test 01/21/21  1303 08/07/20  1527 02/03/20  1003   WBC 6.8 6.1 6.5   HGB 16.2 15.2 17.1    264 297       LFTs -   Recent Labs   Lab Test 01/21/21  1303 08/07/20  1527 05/05/20  1021 02/03/20  1003 07/25/19  0902   ALKPHOS  --   --  53 70 78   BILITOTAL  --   --  0.5 0.8 0.3   ALT  --   --  37 31 63   AST  --   --  18 11 26   PROTTOTAL  --   --  8.3 8.6 8.2   ALBUMIN 4.4 4.2 4.0 4.3 3.9       Liver Function Studies -   Recent Labs   Lab Test 02/08/21  1544   PROTTOTAL 8.0   ALBUMIN 4.2   BILITOTAL 0.8   ALKPHOS 68   AST 13   ALT 31       Iron Panel -   Recent Labs   Lab Test 07/25/19  0902   ZEE 82     INR   Date Value Ref Range Status   02/08/2021 1.02 0.86 - 1.14 Final        Current Medications:  Current Outpatient Medications   Medication     adapalene (DIFFERIN) 0.1 % cream     cholecalciferol (VITAMIN  -D) 1000 UNITS capsule     cyanocolbalamin (VITAMIN  B-12) 500 MCG tablet     guanFACINE HCl (INTUNIV) 3 MG TB24     lisinopril (ZESTRIL) 40 MG tablet     ursodiol (ACTIGALL) 300 MG capsule     No current facility-administered medications for this visit.        Assessment & Recommendations:   Solomon Wilcox is a 20 year old year old male, with h/o Caroli disease with congenital hepatic fibrosis, autosomal recessive polycystic kidney disease and congenital hepatic fibrosis beginning at the age 1, cholecystectomy (10th grade), presenting for 1 year follow up.    Caroli disease with congenital hepatic fibrosis, stable   He has several cysts that are fairly good size in his liver.  In past labs and imaging, his liver function was completely normal and there was no evidence of portal hypertension.  Specifically, he has a normal spleen size and normal platelet count, and antegrade flow through the portal vein. During today's lab: BMP, INR, LFT all unremarkable. His Cr is 1.25, the best it has been since 2015.  - No need for yearly imaging of the abd.  - Perhaps in 5 years, repeat MRCP to make  sure there is no significant change to the cysts  - F/U in 1 year    This patient was staffed and discussed with Dr. Goss.    Talisha De Souza MD  PGY2  IM resident  [phone]     The patient was seen and examined.  The above assessment and plan was developed jointly with the fellow.      Thank you very much for allowing me to participate in the care of this patient.  If you have any questions regarding my recommendations, please do not hesitate to contact me.         Lan Goss MD      Professor of Medicine  [employer]      Executive Medical Director, Solid Organ Transplant Program  [facility]

## 2021-02-08 NOTE — NURSING NOTE
"Chief Complaint   Patient presents with     RECHECK     Caroli disease with congenital hepatic fibrosis     Vital signs:  Temp: 98.2  F (36.8  C) Temp src: Oral BP: (!) 147/94 Pulse: 61   Resp: 18 SpO2: 98 %     Height: 177.8 cm (5' 10\") Weight: 101.5 kg (223 lb 11.2 oz)  Estimated body mass index is 32.1 kg/m  as calculated from the following:    Height as of this encounter: 1.778 m (5' 10\").    Weight as of this encounter: 101.5 kg (223 lb 11.2 oz).        Nikki Bronson, [facility]  2/8/2021 4:01 PM      "

## 2021-03-26 ENCOUNTER — TELEPHONE (OUTPATIENT)
Dept: NEPHROLOGY | Facility: CLINIC | Age: 21
End: 2021-03-26

## 2021-03-26 NOTE — TELEPHONE ENCOUNTER
Attempt to call patient to check in. Received call from his mom regarding COVID vaccine and BP will try again.  Cassi Solo LPN  Nephrology  [phone]

## 2021-03-29 ENCOUNTER — TRANSCRIBE ORDERS (OUTPATIENT)
Dept: OTHER | Age: 21
End: 2021-03-29

## 2021-03-29 DIAGNOSIS — Q61.3 CONGENITAL POLYCYSTIC KIDNEY: Primary | ICD-10-CM

## 2021-09-12 ENCOUNTER — HEALTH MAINTENANCE LETTER (OUTPATIENT)
Age: 21
End: 2021-09-12

## 2022-01-02 ENCOUNTER — HEALTH MAINTENANCE LETTER (OUTPATIENT)
Age: 22
End: 2022-01-02

## 2022-01-12 DIAGNOSIS — Q61.3 POLYCYSTIC KIDNEY DISEASE: ICD-10-CM

## 2022-01-12 RX ORDER — URSODIOL 300 MG/1
CAPSULE ORAL
Qty: 180 CAPSULE | Refills: 3 | Status: SHIPPED | OUTPATIENT
Start: 2022-01-12 | End: 2022-08-22

## 2022-01-24 DIAGNOSIS — I15.8 OTHER SECONDARY HYPERTENSION: ICD-10-CM

## 2022-01-24 RX ORDER — LISINOPRIL 40 MG/1
40 TABLET ORAL DAILY
Qty: 90 TABLET | Refills: 3 | Status: SHIPPED | OUTPATIENT
Start: 2022-01-24

## 2022-02-08 DIAGNOSIS — Q44.5 CAROLI DISEASE: ICD-10-CM

## 2022-02-14 ENCOUNTER — OFFICE VISIT (OUTPATIENT)
Dept: GASTROENTEROLOGY | Facility: CLINIC | Age: 22
End: 2022-02-14
Attending: INTERNAL MEDICINE
Payer: COMMERCIAL

## 2022-02-14 ENCOUNTER — LAB (OUTPATIENT)
Dept: LAB | Facility: CLINIC | Age: 22
End: 2022-02-14
Attending: INTERNAL MEDICINE
Payer: COMMERCIAL

## 2022-02-14 VITALS
DIASTOLIC BLOOD PRESSURE: 87 MMHG | TEMPERATURE: 98.1 F | WEIGHT: 171.5 LBS | HEIGHT: 70 IN | SYSTOLIC BLOOD PRESSURE: 145 MMHG | BODY MASS INDEX: 24.55 KG/M2 | HEART RATE: 59 BPM | OXYGEN SATURATION: 99 % | RESPIRATION RATE: 16 BRPM

## 2022-02-14 DIAGNOSIS — Q44.5 CAROLI DISEASE: ICD-10-CM

## 2022-02-14 DIAGNOSIS — Z23 NEED FOR COVID-19 VACCINE: Primary | ICD-10-CM

## 2022-02-14 LAB
ALBUMIN SERPL-MCNC: 4.3 G/DL (ref 3.4–5)
ALP SERPL-CCNC: 50 U/L (ref 40–150)
ALT SERPL W P-5'-P-CCNC: 26 U/L (ref 0–70)
ANION GAP SERPL CALCULATED.3IONS-SCNC: 8 MMOL/L (ref 3–14)
AST SERPL W P-5'-P-CCNC: 18 U/L (ref 0–45)
BILIRUB DIRECT SERPL-MCNC: 0.2 MG/DL (ref 0–0.2)
BILIRUB SERPL-MCNC: 0.9 MG/DL (ref 0.2–1.3)
BUN SERPL-MCNC: 21 MG/DL (ref 7–30)
CALCIUM SERPL-MCNC: 9.3 MG/DL (ref 8.5–10.1)
CHLORIDE BLD-SCNC: 104 MMOL/L (ref 94–109)
CO2 SERPL-SCNC: 27 MMOL/L (ref 20–32)
CREAT SERPL-MCNC: 1.48 MG/DL (ref 0.66–1.25)
GFR SERPL CREATININE-BSD FRML MDRD: 68 ML/MIN/1.73M2
GLUCOSE BLD-MCNC: 91 MG/DL (ref 70–99)
INR PPP: 1 (ref 0.85–1.15)
POTASSIUM BLD-SCNC: 4.1 MMOL/L (ref 3.4–5.3)
PROT SERPL-MCNC: 7.9 G/DL (ref 6.8–8.8)
SODIUM SERPL-SCNC: 139 MMOL/L (ref 133–144)

## 2022-02-14 PROCEDURE — G0463 HOSPITAL OUTPT CLINIC VISIT: HCPCS | Mod: 25

## 2022-02-14 PROCEDURE — 99214 OFFICE O/P EST MOD 30 MIN: CPT | Performed by: INTERNAL MEDICINE

## 2022-02-14 PROCEDURE — 85610 PROTHROMBIN TIME: CPT | Performed by: PATHOLOGY

## 2022-02-14 PROCEDURE — 0012A HC ADMIN COVID VAC MODERNA, 2ND DOSE: CPT | Performed by: INTERNAL MEDICINE

## 2022-02-14 PROCEDURE — 250N000011 HC RX IP 250 OP 636: Performed by: INTERNAL MEDICINE

## 2022-02-14 PROCEDURE — 82248 BILIRUBIN DIRECT: CPT | Performed by: PATHOLOGY

## 2022-02-14 PROCEDURE — 91301 HC RX IP 250 OP 636: CPT | Performed by: INTERNAL MEDICINE

## 2022-02-14 PROCEDURE — 36415 COLL VENOUS BLD VENIPUNCTURE: CPT | Performed by: PATHOLOGY

## 2022-02-14 PROCEDURE — 80053 COMPREHEN METABOLIC PANEL: CPT | Performed by: PATHOLOGY

## 2022-02-14 RX ADMIN — CX-024414 0.25 ML: 0.2 INJECTION, SUSPENSION INTRAMUSCULAR at 15:28

## 2022-02-14 ASSESSMENT — PAIN SCALES - GENERAL: PAINLEVEL: NO PAIN (0)

## 2022-02-14 ASSESSMENT — MIFFLIN-ST. JEOR: SCORE: 1779.17

## 2022-02-14 NOTE — PROGRESS NOTES
"I had the pleasure of seeing Solomon Wilcox for followup in the Liver Clinic at the [facility] on 02/14/2022.  Mr. Wilcox returns for followup of congenital hepatic fibrosis.  He also has polycystic kidney disease as well.    He feels well at this visit.  He denies any abdominal pain, itching or skin rash or fatigue.  He denies increase in abdominal girth or lower extremity edema.  He has not had any gastrointestinal bleeding.    He denies any fevers or chills, cough or shortness of breath.  He denies any nausea or vomiting, diarrhea or constipation.  His appetite has been good.  His weight is actually down about 50 pounds intentionally.    Current Outpatient Medications   Medication     cholecalciferol (VITAMIN  -D) 1000 UNITS capsule     cyanocolbalamin (VITAMIN  B-12) 500 MCG tablet     lisinopril (ZESTRIL) 40 MG tablet     ursodiol (ACTIGALL) 300 MG capsule     guanFACINE HCl (INTUNIV) 3 MG TB24     Current Facility-Administered Medications   Medication     COVID-19 mRNA vacc (Moderna) injection 0.25 mL     EPINEPHrine (ADRENALIN) kit 0.3 mg     BP (!) 145/87 (BP Location: Left arm, Patient Position: Sitting, Cuff Size: Adult Regular)   Pulse 59   Temp 98.1  F (36.7  C) (Oral)   Resp 16   Ht 1.778 m (5' 10\")   Wt 77.8 kg (171 lb 8 oz)   SpO2 99%   BMI 24.61 kg/m      PHYSICAL EXAMINATION:    GENERAL:  He looks quite well.  HEENT:  No scleral icterus or temporal muscle wasting.  CHEST:  Clear.    ABDOMEN:  No increase in girth, no masses or tenderness to palpation.  Liver is 10 cm in span without left lobe enlargement.  No spleen tip is palpable.  SKIN:  No stigmata of chronic liver disease.  NEUROLOGIC:  Exam shows no asterixis.      Recent Results (from the past 168 hour(s))   Basic metabolic panel    Collection Time: 02/14/22  1:31 PM   Result Value Ref Range    Sodium 139 133 - 144 mmol/L    Potassium 4.1 3.4 - 5.3 mmol/L    Chloride 104 94 - 109 mmol/L    Carbon Dioxide (CO2) " 27 20 - 32 mmol/L    Anion Gap 8 3 - 14 mmol/L    Urea Nitrogen 21 7 - 30 mg/dL    Creatinine 1.48 (H) 0.66 - 1.25 mg/dL    Calcium 9.3 8.5 - 10.1 mg/dL    Glucose 91 70 - 99 mg/dL    GFR Estimate 68 >60 mL/min/1.73m2   Hepatic function panel    Collection Time: 02/14/22  1:31 PM   Result Value Ref Range    Bilirubin Total 0.9 0.2 - 1.3 mg/dL    Bilirubin Direct 0.2 0.0 - 0.2 mg/dL    Protein Total 7.9 6.8 - 8.8 g/dL    Albumin 4.3 3.4 - 5.0 g/dL    Alkaline Phosphatase 50 40 - 150 U/L    AST 18 0 - 45 U/L    ALT 26 0 - 70 U/L   INR    Collection Time: 02/14/22  1:31 PM   Result Value Ref Range    INR 1.00 0.85 - 1.15      IMPRESSION:  Mr. Wilcox has congenital hepatic fibrosis.  He continues to have no signs of portal hypertension.  He will get a second dose of the COVID-19 vaccine.  He got the J and J vaccine and is getting Moderna this time.  I otherwise will not making any change to his medical regimen.  I do think that I would want to see him every 2 months given the stability of his congenital hepatic fibrosis.      Thank you very much for allowing me to participate in the care of this patient.  If you have any questions regarding my recommendations, please do not hesitate to contact me.         Lan Goss MD      Professor of Medicine  [employer]      Executive Medical Director, Solid Organ Transplant Program  [facility]

## 2022-02-14 NOTE — NURSING NOTE
"Chief Complaint   Patient presents with     RECHECK     Caroli disease with congenital hepatic fibrosis     Vital signs:  Temp: 98.1  F (36.7  C) Temp src: Oral BP: (!) 145/87 Pulse: 59   Resp: 16 SpO2: 99 %     Height: 177.8 cm (5' 10\") Weight: 77.8 kg (171 lb 8 oz)  Estimated body mass index is 24.61 kg/m  as calculated from the following:    Height as of this encounter: 1.778 m (5' 10\").    Weight as of this encounter: 77.8 kg (171 lb 8 oz).      Nikki Bronson, [facility]  2/14/2022 2:33 PM      "

## 2022-02-14 NOTE — LETTER
"  2/14/2022     RE: Solomon Wilcox  [address] [facility] [address]    I had the pleasure of seeing Solomon Wilcox for followup in the Liver Clinic at the [facility] on 02/14/2022.  Mr. Wilcox returns for followup of congenital hepatic fibrosis.  He also has polycystic kidney disease as well.    He feels well at this visit.  He denies any abdominal pain, itching or skin rash or fatigue.  He denies increase in abdominal girth or lower extremity edema.  He has not had any gastrointestinal bleeding.    He denies any fevers or chills, cough or shortness of breath.  He denies any nausea or vomiting, diarrhea or constipation.  His appetite has been good.  His weight is actually down about 50 pounds intentionally.    Current Outpatient Medications   Medication     cholecalciferol (VITAMIN  -D) 1000 UNITS capsule     cyanocolbalamin (VITAMIN  B-12) 500 MCG tablet     lisinopril (ZESTRIL) 40 MG tablet     ursodiol (ACTIGALL) 300 MG capsule     guanFACINE HCl (INTUNIV) 3 MG TB24     Current Facility-Administered Medications   Medication     COVID-19 mRNA vacc (Moderna) injection 0.25 mL     EPINEPHrine (ADRENALIN) kit 0.3 mg     BP (!) 145/87 (BP Location: Left arm, Patient Position: Sitting, Cuff Size: Adult Regular)   Pulse 59   Temp 98.1  F (36.7  C) (Oral)   Resp 16   Ht 1.778 m (5' 10\")   Wt 77.8 kg (171 lb 8 oz)   SpO2 99%   BMI 24.61 kg/m      PHYSICAL EXAMINATION:    GENERAL:  He looks quite well.  HEENT:  No scleral icterus or temporal muscle wasting.  CHEST:  Clear.    ABDOMEN:  No increase in girth, no masses or tenderness to palpation.  Liver is 10 cm in span without left lobe enlargement.  No spleen tip is palpable.  SKIN:  No stigmata of chronic liver disease.  NEUROLOGIC:  Exam shows no asterixis.      Recent Results (from the past 168 hour(s))   Basic metabolic panel    Collection Time: 02/14/22  1:31 PM   Result Value Ref Range    Sodium 139 133 - 144 mmol/L    " Potassium 4.1 3.4 - 5.3 mmol/L    Chloride 104 94 - 109 mmol/L    Carbon Dioxide (CO2) 27 20 - 32 mmol/L    Anion Gap 8 3 - 14 mmol/L    Urea Nitrogen 21 7 - 30 mg/dL    Creatinine 1.48 (H) 0.66 - 1.25 mg/dL    Calcium 9.3 8.5 - 10.1 mg/dL    Glucose 91 70 - 99 mg/dL    GFR Estimate 68 >60 mL/min/1.73m2   Hepatic function panel    Collection Time: 02/14/22  1:31 PM   Result Value Ref Range    Bilirubin Total 0.9 0.2 - 1.3 mg/dL    Bilirubin Direct 0.2 0.0 - 0.2 mg/dL    Protein Total 7.9 6.8 - 8.8 g/dL    Albumin 4.3 3.4 - 5.0 g/dL    Alkaline Phosphatase 50 40 - 150 U/L    AST 18 0 - 45 U/L    ALT 26 0 - 70 U/L   INR    Collection Time: 02/14/22  1:31 PM   Result Value Ref Range    INR 1.00 0.85 - 1.15      IMPRESSION:  Mr. Wilcox has congenital hepatic fibrosis.  He continues to have no signs of portal hypertension.  He will get a second dose of the COVID-19 vaccine.  He got the J and J vaccine and is getting Moderna this time.  I otherwise will not making any change to his medical regimen.  I do think that I would want to see him every 2 months given the stability of his congenital hepatic fibrosis.      Thank you very much for allowing me to participate in the care of this patient.  If you have any questions regarding my recommendations, please do not hesitate to contact me.         Lan Goss MD      Professor of Medicine  [employer]      Executive Medical Director, Solid Organ Transplant Program  [facility]

## 2022-02-14 NOTE — LETTER
"    2/14/2022         RE: Solomon Wilcox  [address]  [facility] [address]        Dear Colleague,    Thank you for referring your patient, Solomon Wilcox, to the [facility] [place]. Please see a copy of my visit note below.    I had the pleasure of seeing Solomon Wilcox for followup in the Liver Clinic at the [facility] on 02/14/2022.  Mr. Wilcox returns for followup of congenital hepatic fibrosis.  He also has polycystic kidney disease as well.    He feels well at this visit.  He denies any abdominal pain, itching or skin rash or fatigue.  He denies increase in abdominal girth or lower extremity edema.  He has not had any gastrointestinal bleeding.    He denies any fevers or chills, cough or shortness of breath.  He denies any nausea or vomiting, diarrhea or constipation.  His appetite has been good.  His weight is actually down about 50 pounds intentionally.    Current Outpatient Medications   Medication     cholecalciferol (VITAMIN  -D) 1000 UNITS capsule     cyanocolbalamin (VITAMIN  B-12) 500 MCG tablet     lisinopril (ZESTRIL) 40 MG tablet     ursodiol (ACTIGALL) 300 MG capsule     guanFACINE HCl (INTUNIV) 3 MG TB24     Current Facility-Administered Medications   Medication     COVID-19 mRNA vacc (Moderna) injection 0.25 mL     EPINEPHrine (ADRENALIN) kit 0.3 mg     BP (!) 145/87 (BP Location: Left arm, Patient Position: Sitting, Cuff Size: Adult Regular)   Pulse 59   Temp 98.1  F (36.7  C) (Oral)   Resp 16   Ht 1.778 m (5' 10\")   Wt 77.8 kg (171 lb 8 oz)   SpO2 99%   BMI 24.61 kg/m      PHYSICAL EXAMINATION:    GENERAL:  He looks quite well.  HEENT:  No scleral icterus or temporal muscle wasting.  CHEST:  Clear.    ABDOMEN:  No increase in girth, no masses or tenderness to palpation.  Liver is 10 cm in span without left lobe enlargement.  No spleen tip is palpable.  SKIN:  No stigmata of chronic liver disease.  NEUROLOGIC:  Exam shows " no asterixis.      Recent Results (from the past 168 hour(s))   Basic metabolic panel    Collection Time: 02/14/22  1:31 PM   Result Value Ref Range    Sodium 139 133 - 144 mmol/L    Potassium 4.1 3.4 - 5.3 mmol/L    Chloride 104 94 - 109 mmol/L    Carbon Dioxide (CO2) 27 20 - 32 mmol/L    Anion Gap 8 3 - 14 mmol/L    Urea Nitrogen 21 7 - 30 mg/dL    Creatinine 1.48 (H) 0.66 - 1.25 mg/dL    Calcium 9.3 8.5 - 10.1 mg/dL    Glucose 91 70 - 99 mg/dL    GFR Estimate 68 >60 mL/min/1.73m2   Hepatic function panel    Collection Time: 02/14/22  1:31 PM   Result Value Ref Range    Bilirubin Total 0.9 0.2 - 1.3 mg/dL    Bilirubin Direct 0.2 0.0 - 0.2 mg/dL    Protein Total 7.9 6.8 - 8.8 g/dL    Albumin 4.3 3.4 - 5.0 g/dL    Alkaline Phosphatase 50 40 - 150 U/L    AST 18 0 - 45 U/L    ALT 26 0 - 70 U/L   INR    Collection Time: 02/14/22  1:31 PM   Result Value Ref Range    INR 1.00 0.85 - 1.15      IMPRESSION:  Mr. Wilcox has congenital hepatic fibrosis.  He continues to have no signs of portal hypertension.  He will get a second dose of the COVID-19 vaccine.  He got the J and J vaccine and is getting Moderna this time.  I otherwise will not making any change to his medical regimen.  I do think that I would want to see him every 2 months given the stability of his congenital hepatic fibrosis.      Thank you very much for allowing me to participate in the care of this patient.  If you have any questions regarding my recommendations, please do not hesitate to contact me.         Lan Goss MD      Professor of Medicine  [employer]      Executive Medical Director, Solid Organ Transplant Program  [facility]          Again, thank you for allowing me to participate in the care of your patient.        Sincerely,        Lan Goss MD

## 2022-02-18 ENCOUNTER — TELEPHONE (OUTPATIENT)
Dept: NEPHROLOGY | Facility: CLINIC | Age: 22
End: 2022-02-18
Payer: COMMERCIAL

## 2022-02-18 NOTE — TELEPHONE ENCOUNTER
Health Call Center    Phone Message    May a detailed message be left on voicemail: yes     Reason for Call: Patient and mother calling. Patient said his appt was supposed to be today, but received a call saying he missed appt yesterday with Dr. Chapin.   Patient is extremely upset because he took work off and is very concerned about his health if he has to wait until March 31 to get in again. Writer added patient to wait list also. Please reach out to patient (by phone call) as he does not want to message on [employer] anymore. Would like to speak to physical person. Thank you!    Action Taken: Message routed to:  [facility] Neph    Travel Screening: Not Applicable

## 2022-02-22 NOTE — TELEPHONE ENCOUNTER
Writer called patient as Dr Chapin ok'd 1230 om in person slot for Thursday Feb 24th but patient had taken time off work for March 31st and will keep that appointment if ok. Lab appointment also scheduled.  Cassi Solo LPN  Nephrology  [phone]

## 2022-03-27 DIAGNOSIS — N18.2 CKD (CHRONIC KIDNEY DISEASE), STAGE II: Primary | ICD-10-CM

## 2022-03-31 ENCOUNTER — LAB (OUTPATIENT)
Dept: LAB | Facility: CLINIC | Age: 22
End: 2022-03-31
Payer: COMMERCIAL

## 2022-03-31 ENCOUNTER — OFFICE VISIT (OUTPATIENT)
Dept: NEPHROLOGY | Facility: CLINIC | Age: 22
End: 2022-03-31
Attending: INTERNAL MEDICINE
Payer: COMMERCIAL

## 2022-03-31 VITALS
HEART RATE: 61 BPM | HEIGHT: 70 IN | TEMPERATURE: 97.9 F | SYSTOLIC BLOOD PRESSURE: 152 MMHG | WEIGHT: 177 LBS | DIASTOLIC BLOOD PRESSURE: 103 MMHG | BODY MASS INDEX: 25.34 KG/M2 | RESPIRATION RATE: 18 BRPM | OXYGEN SATURATION: 96 %

## 2022-03-31 DIAGNOSIS — N18.2 CKD (CHRONIC KIDNEY DISEASE), STAGE II: ICD-10-CM

## 2022-03-31 DIAGNOSIS — I15.8 OTHER SECONDARY HYPERTENSION: Primary | ICD-10-CM

## 2022-03-31 LAB
ALBUMIN SERPL-MCNC: 4.5 G/DL (ref 3.4–5)
ALBUMIN UR-MCNC: 30 MG/DL
ANION GAP SERPL CALCULATED.3IONS-SCNC: 7 MMOL/L (ref 3–14)
APPEARANCE UR: CLEAR
BILIRUB UR QL STRIP: NEGATIVE
BUN SERPL-MCNC: 19 MG/DL (ref 7–30)
CALCIUM SERPL-MCNC: 9.4 MG/DL (ref 8.5–10.1)
CHLORIDE BLD-SCNC: 107 MMOL/L (ref 94–109)
CO2 SERPL-SCNC: 28 MMOL/L (ref 20–32)
COLOR UR AUTO: ABNORMAL
CREAT SERPL-MCNC: 1.51 MG/DL (ref 0.66–1.25)
CREAT UR-MCNC: 83 MG/DL
ERYTHROCYTE [DISTWIDTH] IN BLOOD BY AUTOMATED COUNT: 12.2 % (ref 10–15)
GFR SERPL CREATININE-BSD FRML MDRD: 66 ML/MIN/1.73M2
GLUCOSE BLD-MCNC: 89 MG/DL (ref 70–99)
GLUCOSE UR STRIP-MCNC: NEGATIVE MG/DL
HCT VFR BLD AUTO: 44.2 % (ref 40–53)
HGB BLD-MCNC: 14.6 G/DL (ref 13.3–17.7)
HGB UR QL STRIP: NEGATIVE
KETONES UR STRIP-MCNC: NEGATIVE MG/DL
LEUKOCYTE ESTERASE UR QL STRIP: NEGATIVE
MCH RBC QN AUTO: 28.8 PG (ref 26.5–33)
MCHC RBC AUTO-ENTMCNC: 33 G/DL (ref 31.5–36.5)
MCV RBC AUTO: 87 FL (ref 78–100)
MUCOUS THREADS #/AREA URNS LPF: PRESENT /LPF
NITRATE UR QL: NEGATIVE
PH UR STRIP: 6 [PH] (ref 5–7)
PHOSPHATE SERPL-MCNC: 3 MG/DL (ref 2.5–4.5)
PLATELET # BLD AUTO: 256 10E3/UL (ref 150–450)
POTASSIUM BLD-SCNC: 3.7 MMOL/L (ref 3.4–5.3)
PROT UR-MCNC: 0.5 G/L
PROT/CREAT 24H UR: 0.6 G/G CR (ref 0–0.2)
PTH-INTACT SERPL-MCNC: 32 PG/ML (ref 15–65)
RBC # BLD AUTO: 5.07 10E6/UL (ref 4.4–5.9)
RBC URINE: 1 /HPF
SODIUM SERPL-SCNC: 142 MMOL/L (ref 133–144)
SP GR UR STRIP: 1.01 (ref 1–1.03)
UROBILINOGEN UR STRIP-MCNC: NORMAL MG/DL
WBC # BLD AUTO: 5.6 10E3/UL (ref 4–11)
WBC URINE: 2 /HPF

## 2022-03-31 PROCEDURE — 99000 SPECIMEN HANDLING OFFICE-LAB: CPT | Performed by: PATHOLOGY

## 2022-03-31 PROCEDURE — 85027 COMPLETE CBC AUTOMATED: CPT | Performed by: PATHOLOGY

## 2022-03-31 PROCEDURE — 84156 ASSAY OF PROTEIN URINE: CPT | Performed by: PATHOLOGY

## 2022-03-31 PROCEDURE — 36415 COLL VENOUS BLD VENIPUNCTURE: CPT | Performed by: PATHOLOGY

## 2022-03-31 PROCEDURE — 99214 OFFICE O/P EST MOD 30 MIN: CPT | Performed by: INTERNAL MEDICINE

## 2022-03-31 PROCEDURE — 81001 URINALYSIS AUTO W/SCOPE: CPT | Performed by: PATHOLOGY

## 2022-03-31 PROCEDURE — 82306 VITAMIN D 25 HYDROXY: CPT | Mod: 90 | Performed by: PATHOLOGY

## 2022-03-31 PROCEDURE — 80069 RENAL FUNCTION PANEL: CPT | Performed by: PATHOLOGY

## 2022-03-31 PROCEDURE — 83970 ASSAY OF PARATHORMONE: CPT | Performed by: PATHOLOGY

## 2022-03-31 RX ORDER — AMLODIPINE BESYLATE 10 MG/1
10 TABLET ORAL AT BEDTIME
Qty: 30 TABLET | Refills: 3 | Status: SHIPPED | OUTPATIENT
Start: 2022-03-31 | End: 2022-04-28

## 2022-03-31 ASSESSMENT — PAIN SCALES - GENERAL: PAINLEVEL: NO PAIN (0)

## 2022-03-31 NOTE — NURSING NOTE
"Chief Complaint   Patient presents with     RECHECK     CKD     Vital signs:  Temp: 97.9  F (36.6  C) Temp src: Oral BP: (!) 152/103 Pulse: 61   Resp: 18 SpO2: 96 %     Height: 177.8 cm (5' 10\") Weight: 80.3 kg (177 lb)  Estimated body mass index is 25.4 kg/m  as calculated from the following:    Height as of this encounter: 1.778 m (5' 10\").    Weight as of this encounter: 80.3 kg (177 lb).      Nikki Bronson, [facility]  3/31/2022 3:19 PM      "

## 2022-03-31 NOTE — PROGRESS NOTES
Nephrology Clinic Follow Up  2022      Solomon Wilcox MRN:[id number] YOB: 2000  Date of Admission:(Not on file)  Primary care provider: Brando Yost  Requesting physician: Jayla Chapin, *      REASON FOR CONSULT: ARPKD, CKD stage 3       HISTORY OF PRESENT ILLNESS:    Solomon Wilcox is a 19 year old male with a history of ARPKD, CKD stage II, hypertension who presents with his mother for transfer of service. He was first diagnosed with ARPKD around the time of his birth. Both the mother and father are carriers of the disease. His mother notes her pregnancy with the patient was complicated with an emergency  and a pneumothorax  Liver cysts were discovered at the age of 1. Since then, his kidney function has been around 70% and has remained stable. Patient was very active with sports in high school. He was last evaluated by Dr. Ni in pediatric nephrology on 19 at which time he reported no new kidney concerns. Since then he has been doing well with no hospitalizations and no surgeries. He is not having abdominal or flank pain, headaches, gross hematuria. His energy is good. Patient typically exercises 4-5 times per week, mostly weight training. He is not taking any supplements. No history of kidney stones. No other health concerns. Patient has a history of gallbladder removal and had an averse reaction to the anesthetic. He currently attends college at [employer] and is studying environmental studies.     PAST MEDICAL HISTORY:  Reviewed with patient on 2022   As per HPI    MEDICATIONS:  Reviewed with the patient in detail    ALLERGIES:    Reviewed with the patient in detail    REVIEW OF SYSTEMS:  A comprehensive of systems was negative except as noted above.    SOCIAL HISTORY:   Reviewed with patient, no smoking and no alcohol use     FAMILY MEDICAL HISTORY:   Reviewed, no family history of need for dialysis, transplant or CKD    PHYSICAL EXAM:  "  Vital signs:BP (!) 152/103   Pulse 61   Temp 97.9  F (36.6  C) (Oral)   Resp 18   Ht 1.778 m (5' 10\")   Wt 80.3 kg (177 lb)   SpO2 96%   BMI 25.40 kg/m      Physical Exam     Gen: Appears well  Neck: No JVD  Lungs: CTA  CVS: S1S2 normal, no murmurs heard  LE: no edema  Skin: no rashes  CNS: Grossly normal     Interval History: He has been doing well overall. He Checks blood pressures at home and run in 130-140's mm of Hg. He has been running a lot since last summer and has lost a lot of weight. He denies any symptoms today.    ASSESSMENT AND RECOMMENDATIONS:     # ARPKD   #CKD stage 2  #Hypertension  #Hepatic fibrosis- Liver function is stable. Following with Dr. 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 Creatinine has been very stable over the past few years, baseline ~ 1.4-1.5 mg/dl .Mild proteinuria. No hematuria. Likely increase in creatinine due to increased muscle mass, he does frequent weight training. His blood pressures at home are currently in the systolics of 130's-140's and he denies any hypervolemia. He remains on Lisinopril 40 mg daily.   He does not have other metabolic complications of CKD.    Plan:   --will add amlodipine 10 mg Q HS to optimize his blood pressures further.      Follow-up: 1 year           "

## 2022-03-31 NOTE — LETTER
3/31/2022     RE: Solomon Wilcox  [address] [facility] [address]     Dear Colleague,    Thank you for referring your patient, Solomon Wilcox, to the [facility]PHR[facility] [place] at [facility]. Please see a copy of my visit note below.      Nephrology Clinic Follow Up  2022      Solomon Wilcox MRN:[id number] YOB: 2000  Date of Admission:(Not on file)  Primary care provider: Brando Yost  Requesting physician: Jayla Chapin, *      REASON FOR CONSULT: ARPKD, CKD stage 3       HISTORY OF PRESENT ILLNESS:    Solomon Wilcox is a 19 year old male with a history of ARPKD, CKD stage II, hypertension who presents with his mother for transfer of service. He was first diagnosed with ARPKD around the time of his birth. Both the mother and father are carriers of the disease. His mother notes her pregnancy with the patient was complicated with an emergency  and a pneumothorax  Liver cysts were discovered at the age of 1. Since then, his kidney function has been around 70% and has remained stable. Patient was very active with sports in high school. He was last evaluated by Dr. Ni in pediatric nephrology on 19 at which time he reported no new kidney concerns. Since then he has been doing well with no hospitalizations and no surgeries. He is not having abdominal or flank pain, headaches, gross hematuria. His energy is good. Patient typically exercises 4-5 times per week, mostly weight training. He is not taking any supplements. No history of kidney stones. No other health concerns. Patient has a history of gallbladder removal and had an averse reaction to the anesthetic. He currently attends college at [employer] and is studying environmental studies.     PAST MEDICAL HISTORY:  Reviewed with patient on 2022   As per HPI    MEDICATIONS:  Reviewed with the patient in  "detail    ALLERGIES:    Reviewed with the patient in detail    REVIEW OF SYSTEMS:  A comprehensive of systems was negative except as noted above.    SOCIAL HISTORY:   Reviewed with patient, no smoking and no alcohol use     FAMILY MEDICAL HISTORY:   Reviewed, no family history of need for dialysis, transplant or CKD    PHYSICAL EXAM:   Vital signs:BP (!) 152/103   Pulse 61   Temp 97.9  F (36.6  C) (Oral)   Resp 18   Ht 1.778 m (5' 10\")   Wt 80.3 kg (177 lb)   SpO2 96%   BMI 25.40 kg/m      Physical Exam     Gen: Appears well  Neck: No JVD  Lungs: CTA  CVS: S1S2 normal, no murmurs heard  LE: no edema  Skin: no rashes  CNS: Grossly normal     Interval History: He has been doing well overall. He Checks blood pressures at home and run in 130-140's mm of Hg. He has been running a lot since last summer and has lost a lot of weight. He denies any symptoms today.    ASSESSMENT AND RECOMMENDATIONS:     # ARPKD   #CKD stage 2  #Hypertension  #Hepatic fibrosis- Liver function is stable. Following with Dr. 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 Creatinine has been very stable over the past few years, baseline ~ 1.4-1.5 mg/dl .Mild proteinuria. No hematuria. Likely increase in creatinine due to increased muscle mass, he does frequent weight training. His blood pressures at home are currently in the systolics of 130's-140's and he denies any hypervolemia. He remains on Lisinopril 40 mg daily.   He does not have other metabolic complications of CKD.    Plan:   --will add amlodipine 10 mg Q HS to optimize his blood pressures further.      Follow-up: 1 year    Again, thank you for allowing me to participate in the care of your patient.      Sincerely,    Jayla Chapin MD    "

## 2022-04-01 LAB — DEPRECATED CALCIDIOL+CALCIFEROL SERPL-MC: 32 UG/L (ref 20–75)

## 2022-04-28 DIAGNOSIS — I15.8 OTHER SECONDARY HYPERTENSION: ICD-10-CM

## 2022-04-28 RX ORDER — AMLODIPINE BESYLATE 10 MG/1
10 TABLET ORAL AT BEDTIME
Qty: 30 TABLET | Refills: 3 | Status: SHIPPED | OUTPATIENT
Start: 2022-04-28 | End: 2022-05-05

## 2022-05-05 DIAGNOSIS — I15.8 OTHER SECONDARY HYPERTENSION: ICD-10-CM

## 2022-05-05 RX ORDER — AMLODIPINE BESYLATE 10 MG/1
10 TABLET ORAL AT BEDTIME
Qty: 90 TABLET | Refills: 3 | Status: SHIPPED | OUTPATIENT
Start: 2022-05-05

## 2022-08-22 DIAGNOSIS — Q61.3 POLYCYSTIC KIDNEY DISEASE: ICD-10-CM

## 2022-08-22 RX ORDER — URSODIOL 300 MG/1
CAPSULE ORAL
Qty: 180 CAPSULE | Refills: 2 | Status: SHIPPED | OUTPATIENT
Start: 2022-08-22

## 2022-11-19 ENCOUNTER — HEALTH MAINTENANCE LETTER (OUTPATIENT)
Age: 22
End: 2022-11-19

## 2023-01-18 ENCOUNTER — TELEPHONE (OUTPATIENT)
Dept: GASTROENTEROLOGY | Facility: CLINIC | Age: 23
End: 2023-01-18
Payer: COMMERCIAL

## 2023-01-18 NOTE — TELEPHONE ENCOUNTER
LVM & sent MyChart message // pt needs to reschedule appt with Dr. Goss on 3.14.23 due to provider having PLE Day // first attempt, AN 1.18.23

## 2023-02-13 DIAGNOSIS — N18.2 CKD (CHRONIC KIDNEY DISEASE), STAGE II: Primary | ICD-10-CM

## 2023-02-16 ENCOUNTER — LAB (OUTPATIENT)
Dept: LAB | Facility: CLINIC | Age: 23
End: 2023-02-16
Payer: COMMERCIAL

## 2023-02-16 ENCOUNTER — OFFICE VISIT (OUTPATIENT)
Dept: NEPHROLOGY | Facility: CLINIC | Age: 23
End: 2023-02-16
Attending: INTERNAL MEDICINE
Payer: COMMERCIAL

## 2023-02-16 VITALS
HEART RATE: 54 BPM | SYSTOLIC BLOOD PRESSURE: 138 MMHG | TEMPERATURE: 97.7 F | OXYGEN SATURATION: 99 % | BODY MASS INDEX: 26.36 KG/M2 | DIASTOLIC BLOOD PRESSURE: 88 MMHG | WEIGHT: 183.7 LBS

## 2023-02-16 DIAGNOSIS — I15.8 OTHER SECONDARY HYPERTENSION: Primary | ICD-10-CM

## 2023-02-16 DIAGNOSIS — N18.2 CKD (CHRONIC KIDNEY DISEASE), STAGE II: ICD-10-CM

## 2023-02-16 LAB
ALBUMIN MFR UR ELPH: 33 MG/DL (ref 1–14)
ALBUMIN SERPL BCG-MCNC: 4.6 G/DL (ref 3.5–5.2)
ALBUMIN UR-MCNC: 20 MG/DL
ANION GAP SERPL CALCULATED.3IONS-SCNC: 8 MMOL/L (ref 7–15)
APPEARANCE UR: CLEAR
BILIRUB UR QL STRIP: NEGATIVE
BUN SERPL-MCNC: 21.4 MG/DL (ref 6–20)
CALCIUM SERPL-MCNC: 9.5 MG/DL (ref 8.6–10)
CHLORIDE SERPL-SCNC: 105 MMOL/L (ref 98–107)
COLOR UR AUTO: ABNORMAL
CREAT SERPL-MCNC: 1.41 MG/DL (ref 0.67–1.17)
CREAT UR-MCNC: 61.3 MG/DL
DEPRECATED HCO3 PLAS-SCNC: 27 MMOL/L (ref 22–29)
ERYTHROCYTE [DISTWIDTH] IN BLOOD BY AUTOMATED COUNT: 12.4 % (ref 10–15)
GFR SERPL CREATININE-BSD FRML MDRD: 71 ML/MIN/1.73M2
GLUCOSE SERPL-MCNC: 88 MG/DL (ref 70–99)
GLUCOSE UR STRIP-MCNC: NEGATIVE MG/DL
HCT VFR BLD AUTO: 43.9 % (ref 40–53)
HGB BLD-MCNC: 14.3 G/DL (ref 13.3–17.7)
HGB UR QL STRIP: NEGATIVE
KETONES UR STRIP-MCNC: NEGATIVE MG/DL
LEUKOCYTE ESTERASE UR QL STRIP: NEGATIVE
MCH RBC QN AUTO: 28.9 PG (ref 26.5–33)
MCHC RBC AUTO-ENTMCNC: 32.6 G/DL (ref 31.5–36.5)
MCV RBC AUTO: 89 FL (ref 78–100)
NITRATE UR QL: NEGATIVE
PH UR STRIP: 5.5 [PH] (ref 5–7)
PHOSPHATE SERPL-MCNC: 3.6 MG/DL (ref 2.5–4.5)
PLATELET # BLD AUTO: 233 10E3/UL (ref 150–450)
POTASSIUM SERPL-SCNC: 4.7 MMOL/L (ref 3.4–5.3)
PROT/CREAT 24H UR: 0.54 MG/MG CR (ref 0–0.2)
PTH-INTACT SERPL-MCNC: 43 PG/ML (ref 15–65)
RBC # BLD AUTO: 4.95 10E6/UL (ref 4.4–5.9)
RBC URINE: 1 /HPF
SODIUM SERPL-SCNC: 140 MMOL/L (ref 136–145)
SP GR UR STRIP: 1.01 (ref 1–1.03)
UROBILINOGEN UR STRIP-MCNC: NORMAL MG/DL
WBC # BLD AUTO: 6.2 10E3/UL (ref 4–11)
WBC URINE: 2 /HPF

## 2023-02-16 PROCEDURE — 80069 RENAL FUNCTION PANEL: CPT | Performed by: PATHOLOGY

## 2023-02-16 PROCEDURE — 99000 SPECIMEN HANDLING OFFICE-LAB: CPT | Performed by: PATHOLOGY

## 2023-02-16 PROCEDURE — 36415 COLL VENOUS BLD VENIPUNCTURE: CPT | Performed by: PATHOLOGY

## 2023-02-16 PROCEDURE — 82306 VITAMIN D 25 HYDROXY: CPT | Performed by: PATHOLOGY

## 2023-02-16 PROCEDURE — 83970 ASSAY OF PARATHORMONE: CPT | Performed by: PATHOLOGY

## 2023-02-16 PROCEDURE — 84156 ASSAY OF PROTEIN URINE: CPT | Performed by: PATHOLOGY

## 2023-02-16 PROCEDURE — 99214 OFFICE O/P EST MOD 30 MIN: CPT | Performed by: INTERNAL MEDICINE

## 2023-02-16 PROCEDURE — 85027 COMPLETE CBC AUTOMATED: CPT | Performed by: PATHOLOGY

## 2023-02-16 PROCEDURE — 81001 URINALYSIS AUTO W/SCOPE: CPT | Performed by: PATHOLOGY

## 2023-02-16 RX ORDER — HYDROCHLOROTHIAZIDE 12.5 MG/1
12.5 TABLET ORAL DAILY
Qty: 30 TABLET | Refills: 3 | Status: SHIPPED | OUTPATIENT
Start: 2023-02-16 | End: 2023-04-04

## 2023-02-16 ASSESSMENT — PAIN SCALES - GENERAL: PAINLEVEL: NO PAIN (0)

## 2023-02-16 NOTE — PATIENT INSTRUCTIONS
--start taking hydrochlorothiazide 12.5 mg daily for your blood pressures  --continue to take lisinopril 40 mg daily and amlodipine   --lets check additional blood work to work up your high blood pressure in April  --try to continue to follow a low sodium diet   --continue to check your blood pressures at home and send us a message in a couple of weeks with the readings

## 2023-02-16 NOTE — PROGRESS NOTES
Nephrology Clinic Follow Up  23     Solomon Wilcox MRN:[id number] YOB: 2000  Date of Admission:(Not on file)  Primary care provider: Brando Yost  Requesting physician: Jayla Chapin, *      REASON FOR CONSULT: ARPKD, CKD stage 3       HISTORY OF PRESENT ILLNESS:    Solomon Wilcox is a 19 year old male with a history of ARPKD, CKD stage II, hypertension who presents with his mother for transfer of service. He was first diagnosed with ARPKD around the time of his birth. Both the mother and father are carriers of the disease. His mother notes her pregnancy with the patient was complicated with an emergency  and a pneumothorax  Liver cysts were discovered at the age of 1. Since then, his kidney function has been around 70% and has remained stable. Patient was very active with sports in high school. He was last evaluated by Dr. Ni in pediatric nephrology on 19 at which time he reported no new kidney concerns. Since then he has been doing well with no hospitalizations and no surgeries. He is not having abdominal or flank pain, headaches, gross hematuria. His energy is good. Patient typically exercises 4-5 times per week, mostly weight training. He is not taking any supplements. No history of kidney stones. No other health concerns. Patient has a history of gallbladder removal and had an averse reaction to the anesthetic. He currently attends college at [employer] and is studying environmental studies.      PAST MEDICAL HISTORY:  Reviewed with patient on 2023   As per HPI    MEDICATIONS:  Reviewed with the patient in detail    ALLERGIES:    Reviewed with the patient in detail    REVIEW OF SYSTEMS:  A comprehensive of systems was negative except as noted above.    SOCIAL HISTORY:   Reviewed with patient, no smoking and no alcohol use     FAMILY MEDICAL HISTORY:   Reviewed, no family history of need for dialysis, transplant or CKD    PHYSICAL EXAM:   Vital  signs:/88   Pulse 54   Temp 97.7  F (36.5  C) (Oral)   Wt 83.3 kg (183 lb 11.2 oz)   SpO2 99%   BMI 26.36 kg/m      Physical Exam     Gen: Appears well  Neck: No JVD  Lungs: CTA  CVS: S1S2 normal, no murmurs heard  LE: no edema  Skin: no rashes  CNS: Grossly normal     Interval History: He has been doing well overall. He Checks blood pressures at home and run in 130-140's mm of Hg. He has been running a lot since last summer and has lost a lot of weight. He denies any symptoms today.    Interval history 2/16/23: he denies any new concerns and has been doing great. No new symptoms. Has a cat now which brings him a lot of bessy. He has been checking his blood pressures occasionally at home and have been in the range of high 130's/80-90's. He has been taking all his medications as he should.     ASSESSMENT AND RECOMMENDATIONS:     # ARPKD   #CKD stage 2  #Hypertension  #Hepatic fibrosis- Liver function is stable. Following with Dr. 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 Creatinine has been very stable over the past few years, baseline ~ 1.4-1.5 mg/dl .Mild proteinuria. No hematuria. Likely increase in creatinine due to increased muscle mass, he does frequent weight training. His blood pressures at home are currently in the systolics of 130's-140's and he denies any hypervolemia. He remains on Lisinopril 40 mg daily and amlodipine 10 mg daily.   He does not have other metabolic complications of CKD.  --with his young age, I would like his blood pressures to be better controlled with systolics down to 120's. I will go ahead with adding hydrochlorothiazide 12.5 mg daily and ask him to continue to check his pressures at home.   --he has an appointment with Dr Goss coming up soon, will check his renin vanessa ration at the time, to complete w/up for hyper aldosterone state although his meds could interfere.     Follow-up: 6 months    Jayla Chapin MD

## 2023-02-16 NOTE — LETTER
2023       RE: Solomon Wilcox  [address] [facility] [address]     Dear Colleague,    Thank you for referring your patient, Solomon Wilcox, to the [facility]PHROLOG[facility] [place] at [facility]. Please see a copy of my visit note below.      Nephrology Clinic Follow Up  23     Solomon Wilcox MRN:[id number] YOB: 2000  Date of Admission:(Not on file)  Primary care provider: Brando Yost  Requesting physician: Jayla Chapin, *      REASON FOR CONSULT: ARPKD, CKD stage 3       HISTORY OF PRESENT ILLNESS:    Solomon Wilcox is a 19 year old male with a history of ARPKD, CKD stage II, hypertension who presents with his mother for transfer of service. He was first diagnosed with ARPKD around the time of his birth. Both the mother and father are carriers of the disease. His mother notes her pregnancy with the patient was complicated with an emergency  and a pneumothorax  Liver cysts were discovered at the age of 1. Since then, his kidney function has been around 70% and has remained stable. Patient was very active with sports in high school. He was last evaluated by Dr. Ni in pediatric nephrology on 19 at which time he reported no new kidney concerns. Since then he has been doing well with no hospitalizations and no surgeries. He is not having abdominal or flank pain, headaches, gross hematuria. His energy is good. Patient typically exercises 4-5 times per week, mostly weight training. He is not taking any supplements. No history of kidney stones. No other health concerns. Patient has a history of gallbladder removal and had an averse reaction to the anesthetic. He currently attends college at [employer] and is studying environmental studies.      PAST MEDICAL HISTORY:  Reviewed with patient on 2023   As per HPI    MEDICATIONS:  Reviewed with the patient in detail    ALLERGIES:     Reviewed with the patient in detail    REVIEW OF SYSTEMS:  A comprehensive of systems was negative except as noted above.    SOCIAL HISTORY:   Reviewed with patient, no smoking and no alcohol use     FAMILY MEDICAL HISTORY:   Reviewed, no family history of need for dialysis, transplant or CKD    PHYSICAL EXAM:   Vital signs:/88   Pulse 54   Temp 97.7  F (36.5  C) (Oral)   Wt 83.3 kg (183 lb 11.2 oz)   SpO2 99%   BMI 26.36 kg/m      Physical Exam     Gen: Appears well  Neck: No JVD  Lungs: CTA  CVS: S1S2 normal, no murmurs heard  LE: no edema  Skin: no rashes  CNS: Grossly normal     Interval History: He has been doing well overall. He Checks blood pressures at home and run in 130-140's mm of Hg. He has been running a lot since last summer and has lost a lot of weight. He denies any symptoms today.    Interval history 2/16/23: he denies any new concerns and has been doing great. No new symptoms. Has a cat now which brings him a lot of bessy. He has been checking his blood pressures occasionally at home and have been in the range of high 130's/80-90's. He has been taking all his medications as he should.     ASSESSMENT AND RECOMMENDATIONS:     # ARPKD   #CKD stage 2  #Hypertension  #Hepatic fibrosis- Liver function is stable. Following with Dr. 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 Creatinine has been very stable over the past few years, baseline ~ 1.4-1.5 mg/dl .Mild proteinuria. No hematuria. Likely increase in creatinine due to increased muscle mass, he does frequent weight training. His blood pressures at home are currently in the systolics of 130's-140's and he denies any hypervolemia. He remains on Lisinopril 40 mg daily and amlodipine 10 mg daily.   He does not have other metabolic complications of CKD.  --with his young age, I would like his blood pressures to be better controlled with systolics down to 120's. I will go ahead with adding hydrochlorothiazide 12.5 mg daily and ask him to continue to check  his pressures at home.   --he has an appointment with Dr Goss coming up soon, will check his renin vanessa ration at the time, to complete w/up for hyper aldosterone state although his meds could interfere.     Follow-up: 6 months    Jayla Chapin MD

## 2023-02-16 NOTE — NURSING NOTE
Chief Complaint   Patient presents with     RECHECK       /88   Pulse 54   Temp 97.7  F (36.5  C) (Oral)   Wt 83.3 kg (183 lb 11.2 oz)   SpO2 99%   BMI 26.36 kg/m      Mayo Healy on 2/16/2023 at 10:29 AM    
No

## 2023-02-17 LAB — DEPRECATED CALCIDIOL+CALCIFEROL SERPL-MC: 22 UG/L (ref 20–75)

## 2023-04-04 ENCOUNTER — TELEPHONE (OUTPATIENT)
Dept: MULTI SPECIALTY CLINIC | Facility: CLINIC | Age: 23
End: 2023-04-04
Payer: COMMERCIAL

## 2023-04-04 DIAGNOSIS — I15.8 OTHER SECONDARY HYPERTENSION: ICD-10-CM

## 2023-04-04 NOTE — TELEPHONE ENCOUNTER
LVM & sent mychart // pt needs to schedule 6 month follow up - Return Glom. - with Dr. Chapin around 8.16.23 with labs prior // first attempt, AN 4.4.23

## 2023-04-05 ENCOUNTER — TELEPHONE (OUTPATIENT)
Dept: NEPHROLOGY | Facility: CLINIC | Age: 23
End: 2023-04-05
Payer: COMMERCIAL

## 2023-04-05 ENCOUNTER — TELEPHONE (OUTPATIENT)
Dept: MULTI SPECIALTY CLINIC | Facility: CLINIC | Age: 23
End: 2023-04-05
Payer: COMMERCIAL

## 2023-04-05 NOTE — TELEPHONE ENCOUNTER
[facility] Call Center    Phone Message    May a detailed message be left on voicemail: yes     Reason for Call: Patient calling back to schedule a follow up with Dr. Chapin in August. Writer only seeing video visits and patient declined, requesting in person. Please reach out. States he is not available to talk until after 3pm. Thank you    Action Taken: Message routed to:  [facility] NEPH    Travel Screening: Not Applicable

## 2023-04-05 NOTE — TELEPHONE ENCOUNTER
Sent [employer] (2nd Attempt) for the patient to call back and schedule the following:    Appointment type: 6 Month follow up   Provider: Tavares  Return date: 8/16/2023  Specialty phone number: [phone]  Additional appointment(s) needed: lab  Additonal Notes: n/a

## 2023-04-07 RX ORDER — HYDROCHLOROTHIAZIDE 12.5 MG/1
12.5 TABLET ORAL DAILY
Qty: 90 TABLET | Refills: 3 | Status: SHIPPED | OUTPATIENT
Start: 2023-04-07

## 2023-04-09 ENCOUNTER — HEALTH MAINTENANCE LETTER (OUTPATIENT)
Age: 23
End: 2023-04-09

## 2023-04-14 ENCOUNTER — OFFICE VISIT (OUTPATIENT)
Dept: GASTROENTEROLOGY | Facility: CLINIC | Age: 23
End: 2023-04-14
Attending: INTERNAL MEDICINE
Payer: COMMERCIAL

## 2023-04-14 ENCOUNTER — LAB (OUTPATIENT)
Dept: LAB | Facility: CLINIC | Age: 23
End: 2023-04-14
Attending: INTERNAL MEDICINE
Payer: COMMERCIAL

## 2023-04-14 VITALS
DIASTOLIC BLOOD PRESSURE: 87 MMHG | OXYGEN SATURATION: 97 % | SYSTOLIC BLOOD PRESSURE: 129 MMHG | TEMPERATURE: 97.6 F | HEART RATE: 62 BPM | BODY MASS INDEX: 27.15 KG/M2 | WEIGHT: 189.2 LBS

## 2023-04-14 DIAGNOSIS — Q61.3 POLYCYSTIC KIDNEY DISEASE: ICD-10-CM

## 2023-04-14 DIAGNOSIS — I15.8 OTHER SECONDARY HYPERTENSION: ICD-10-CM

## 2023-04-14 LAB
ALBUMIN SERPL BCG-MCNC: 4.4 G/DL (ref 3.5–5.2)
ALP SERPL-CCNC: 51 U/L (ref 40–129)
ALT SERPL W P-5'-P-CCNC: 19 U/L (ref 10–50)
ANION GAP SERPL CALCULATED.3IONS-SCNC: 8 MMOL/L (ref 7–15)
AST SERPL W P-5'-P-CCNC: 17 U/L (ref 10–50)
BILIRUB DIRECT SERPL-MCNC: <0.2 MG/DL (ref 0–0.3)
BILIRUB SERPL-MCNC: 0.3 MG/DL
BUN SERPL-MCNC: 27.4 MG/DL (ref 6–20)
CALCIUM SERPL-MCNC: 9.5 MG/DL (ref 8.6–10)
CHLORIDE SERPL-SCNC: 106 MMOL/L (ref 98–107)
CREAT SERPL-MCNC: 1.45 MG/DL (ref 0.67–1.17)
DEPRECATED HCO3 PLAS-SCNC: 27 MMOL/L (ref 22–29)
ERYTHROCYTE [DISTWIDTH] IN BLOOD BY AUTOMATED COUNT: 12.1 % (ref 10–15)
GFR SERPL CREATININE-BSD FRML MDRD: 69 ML/MIN/1.73M2
GLUCOSE SERPL-MCNC: 100 MG/DL (ref 70–99)
HCT VFR BLD AUTO: 42.1 % (ref 40–53)
HGB BLD-MCNC: 14 G/DL (ref 13.3–17.7)
INR PPP: 0.92 (ref 0.85–1.15)
MCH RBC QN AUTO: 28.6 PG (ref 26.5–33)
MCHC RBC AUTO-ENTMCNC: 33.3 G/DL (ref 31.5–36.5)
MCV RBC AUTO: 86 FL (ref 78–100)
PLATELET # BLD AUTO: 207 10E3/UL (ref 150–450)
POTASSIUM SERPL-SCNC: 4.3 MMOL/L (ref 3.4–5.3)
PROT SERPL-MCNC: 7.4 G/DL (ref 6.4–8.3)
RBC # BLD AUTO: 4.9 10E6/UL (ref 4.4–5.9)
SODIUM SERPL-SCNC: 141 MMOL/L (ref 136–145)
WBC # BLD AUTO: 7 10E3/UL (ref 4–11)

## 2023-04-14 PROCEDURE — 85610 PROTHROMBIN TIME: CPT | Performed by: PATHOLOGY

## 2023-04-14 PROCEDURE — 99212 OFFICE O/P EST SF 10 MIN: CPT | Performed by: INTERNAL MEDICINE

## 2023-04-14 PROCEDURE — 85027 COMPLETE CBC AUTOMATED: CPT | Performed by: PATHOLOGY

## 2023-04-14 PROCEDURE — 80053 COMPREHEN METABOLIC PANEL: CPT | Performed by: PATHOLOGY

## 2023-04-14 PROCEDURE — 99000 SPECIMEN HANDLING OFFICE-LAB: CPT | Performed by: PATHOLOGY

## 2023-04-14 PROCEDURE — 84244 ASSAY OF RENIN: CPT | Mod: 90 | Performed by: PATHOLOGY

## 2023-04-14 PROCEDURE — 82248 BILIRUBIN DIRECT: CPT | Performed by: PATHOLOGY

## 2023-04-14 PROCEDURE — 36415 COLL VENOUS BLD VENIPUNCTURE: CPT | Performed by: PATHOLOGY

## 2023-04-14 PROCEDURE — 99214 OFFICE O/P EST MOD 30 MIN: CPT | Performed by: INTERNAL MEDICINE

## 2023-04-14 PROCEDURE — 82088 ASSAY OF ALDOSTERONE: CPT | Performed by: PATHOLOGY

## 2023-04-14 ASSESSMENT — PAIN SCALES - GENERAL: PAINLEVEL: NO PAIN (0)

## 2023-04-14 NOTE — PROGRESS NOTES
HISTORY OF PRESENT ILLNESS:  I had the pleasure of seeing Solomon Wilcox for followup in the Liver Clinic at the [facility] on 04/14/2023.  Mr. Wilcox returns for followup of congenital hepatic fibrosis.  He also has polycystic kidney disease but quite interestingly has never required kidney transplantation or dialysis.    He is doing well at this visit.  He denies any abdominal pain, itching, skin rash, or fatigue.  Denies any increased abdominal girth or lower extremity edema.    He denies any fevers or chills, cough or shortness of breath.  Denies any nausea or vomiting, diarrhea or constipation.  His appetite has been good and his weight remains down. When I saw him last, he has lost 50 pounds in weight.  He has managed to keep that off.  He does exercise a couple times a week and mostly concentrates on decreasing his calorie intake.    Current Outpatient Medications   Medication     amLODIPine (NORVASC) 10 MG tablet     cholecalciferol (VITAMIN  -D) 1000 UNITS capsule     cyanocolbalamin (VITAMIN  B-12) 500 MCG tablet     hydrochlorothiazide (HYDRODIURIL) 12.5 MG tablet     lisinopril (ZESTRIL) 40 MG tablet     ursodiol (ACTIGALL) 300 MG capsule     guanFACINE HCl (INTUNIV) 3 MG TB24 24 hr tablet     Current Facility-Administered Medications   Medication     EPINEPHrine (ADRENALIN) kit 0.3 mg     /87   Pulse 62   Temp 97.6  F (36.4  C) (Oral)   Wt 85.8 kg (189 lb 3.2 oz)   SpO2 97%   BMI 27.15 kg/m      PHYSICAL EXAMINATION:    GENERAL:  He looks quite well.  HEENT:  No scleral icterus or temporal muscle wasting.  CHEST:  Clear.  ABDOMEN:  No increase in girth.  No masses or tenderness to palpation are present.  His liver is 10 cm in span without left lobe enlargement.  No spleen tip is palpable.  EXTREMITIES:  No edema.  SKIN:  No stigmata of chronic liver disease.  NEUROLOGIC:  No asterixis.    Recent Results (from the past 168 hour(s))   INR    Collection Time: 04/14/23   9:34 AM   Result Value Ref Range    INR 0.92 0.85 - 1.15   Hepatic function panel    Collection Time: 04/14/23  9:34 AM   Result Value Ref Range    Protein Total 7.4 6.4 - 8.3 g/dL    Albumin 4.4 3.5 - 5.2 g/dL    Bilirubin Total 0.3 <=1.2 mg/dL    Alkaline Phosphatase 51 40 - 129 U/L    AST 17 10 - 50 U/L    ALT 19 10 - 50 U/L    Bilirubin Direct <0.20 0.00 - 0.30 mg/dL   Basic metabolic panel    Collection Time: 04/14/23  9:34 AM   Result Value Ref Range    Sodium 141 136 - 145 mmol/L    Potassium 4.3 3.4 - 5.3 mmol/L    Chloride 106 98 - 107 mmol/L    Carbon Dioxide (CO2) 27 22 - 29 mmol/L    Anion Gap 8 7 - 15 mmol/L    Urea Nitrogen 27.4 (H) 6.0 - 20.0 mg/dL    Creatinine 1.45 (H) 0.67 - 1.17 mg/dL    Calcium 9.5 8.6 - 10.0 mg/dL    Glucose 100 (H) 70 - 99 mg/dL    GFR Estimate 69 >60 mL/min/1.73m2   CBC with platelets    Collection Time: 04/14/23  9:34 AM   Result Value Ref Range    WBC Count 7.0 4.0 - 11.0 10e3/uL    RBC Count 4.90 4.40 - 5.90 10e6/uL    Hemoglobin 14.0 13.3 - 17.7 g/dL    Hematocrit 42.1 40.0 - 53.0 %    MCV 86 78 - 100 fL    MCH 28.6 26.5 - 33.0 pg    MCHC 33.3 31.5 - 36.5 g/dL    RDW 12.1 10.0 - 15.0 %    Platelet Count 207 150 - 450 10e3/uL      IMPRESSION:  Mr. Wilcox has congenital hepatic fibrosis.  There continues to be no signs of portal hypertension, however.  As I mentioned, amazingly, his kidney function has remained quite stable with an estimated GFR of about 70.  He does have a modest amount of proteinuria.  He does follow up with Nephrology.    In terms of healthcare maintenance, he is up to date with regard to vaccines.  He does not need an endoscopy or ultrasound at this point in time.    I did spend a total of 30 minutes (on the date of the encounter), including 20 minutes of face-to-face clinic time including counseling. The rest of the time was spent in documentation and review of records.     Thank you very much for allowing me to participate in the care of this patient.   If you have any questions regarding recommendations, please do not hesitate to contact me.         Lan Goss MD      Professor of Medicine  [employer]      Executive Medical Director, Solid Organ Transplant Program  [facility]

## 2023-04-14 NOTE — LETTER
4/14/2023         RE: Solomon Wilcox  [address]  [place] [address]        Dear Colleague,    Thank you for referring your patient, Solomon Wilcox, to the [facility] [place]. Please see a copy of my visit note below.    HISTORY OF PRESENT ILLNESS:  I had the pleasure of seeing Solomon Wilcox for followup in the Liver Clinic at the [facility] on 04/14/2023.  Mr. Wilcox returns for followup of congenital hepatic fibrosis.  He also has polycystic kidney disease but quite interestingly has never required kidney transplantation or dialysis.    He is doing well at this visit.  He denies any abdominal pain, itching, skin rash, or fatigue.  Denies any increased abdominal girth or lower extremity edema.    He denies any fevers or chills, cough or shortness of breath.  Denies any nausea or vomiting, diarrhea or constipation.  His appetite has been good and his weight remains down. When I saw him last, he has lost 50 pounds in weight.  He has managed to keep that off.  He does exercise a couple times a week and mostly concentrates on decreasing his calorie intake.    Current Outpatient Medications   Medication    amLODIPine (NORVASC) 10 MG tablet    cholecalciferol (VITAMIN  -D) 1000 UNITS capsule    cyanocolbalamin (VITAMIN  B-12) 500 MCG tablet    hydrochlorothiazide (HYDRODIURIL) 12.5 MG tablet    lisinopril (ZESTRIL) 40 MG tablet    ursodiol (ACTIGALL) 300 MG capsule    guanFACINE HCl (INTUNIV) 3 MG TB24 24 hr tablet     Current Facility-Administered Medications   Medication    EPINEPHrine (ADRENALIN) kit 0.3 mg     /87   Pulse 62   Temp 97.6  F (36.4  C) (Oral)   Wt 85.8 kg (189 lb 3.2 oz)   SpO2 97%   BMI 27.15 kg/m      PHYSICAL EXAMINATION:    GENERAL:  He looks quite well.  HEENT:  No scleral icterus or temporal muscle wasting.  CHEST:  Clear.  ABDOMEN:  No increase in girth.  No masses or tenderness to palpation are present.  His  liver is 10 cm in span without left lobe enlargement.  No spleen tip is palpable.  EXTREMITIES:  No edema.  SKIN:  No stigmata of chronic liver disease.  NEUROLOGIC:  No asterixis.    Recent Results (from the past 168 hour(s))   INR    Collection Time: 04/14/23  9:34 AM   Result Value Ref Range    INR 0.92 0.85 - 1.15   Hepatic function panel    Collection Time: 04/14/23  9:34 AM   Result Value Ref Range    Protein Total 7.4 6.4 - 8.3 g/dL    Albumin 4.4 3.5 - 5.2 g/dL    Bilirubin Total 0.3 <=1.2 mg/dL    Alkaline Phosphatase 51 40 - 129 U/L    AST 17 10 - 50 U/L    ALT 19 10 - 50 U/L    Bilirubin Direct <0.20 0.00 - 0.30 mg/dL   Basic metabolic panel    Collection Time: 04/14/23  9:34 AM   Result Value Ref Range    Sodium 141 136 - 145 mmol/L    Potassium 4.3 3.4 - 5.3 mmol/L    Chloride 106 98 - 107 mmol/L    Carbon Dioxide (CO2) 27 22 - 29 mmol/L    Anion Gap 8 7 - 15 mmol/L    Urea Nitrogen 27.4 (H) 6.0 - 20.0 mg/dL    Creatinine 1.45 (H) 0.67 - 1.17 mg/dL    Calcium 9.5 8.6 - 10.0 mg/dL    Glucose 100 (H) 70 - 99 mg/dL    GFR Estimate 69 >60 mL/min/1.73m2   CBC with platelets    Collection Time: 04/14/23  9:34 AM   Result Value Ref Range    WBC Count 7.0 4.0 - 11.0 10e3/uL    RBC Count 4.90 4.40 - 5.90 10e6/uL    Hemoglobin 14.0 13.3 - 17.7 g/dL    Hematocrit 42.1 40.0 - 53.0 %    MCV 86 78 - 100 fL    MCH 28.6 26.5 - 33.0 pg    MCHC 33.3 31.5 - 36.5 g/dL    RDW 12.1 10.0 - 15.0 %    Platelet Count 207 150 - 450 10e3/uL      IMPRESSION:  Mr. Wilcox has congenital hepatic fibrosis.  There continues to be no signs of portal hypertension, however.  As I mentioned, amazingly, his kidney function has remained quite stable with an estimated GFR of about 70.  He does have a modest amount of proteinuria.  He does follow up with Nephrology.    In terms of healthcare maintenance, he is up to date with regard to vaccines.  He does not need an endoscopy or ultrasound at this point in time.    I did spend a total of 30  minutes (on the date of the encounter), including 20 minutes of face-to-face clinic time including counseling. The rest of the time was spent in documentation and review of records.     Thank you very much for allowing me to participate in the care of this patient.  If you have any questions regarding recommendations, please do not hesitate to contact me.         Lan Goss MD      Professor of Medicine  [employer]      Executive Medical Director, Solid Organ Transplant Program  [facility]

## 2023-04-14 NOTE — NURSING NOTE
Chief Complaint   Patient presents with     RECHECK     Annual f/u       /87   Pulse 62   Temp 97.6  F (36.4  C) (Oral)   Wt 85.8 kg (189 lb 3.2 oz)   SpO2 97%   BMI 27.15 kg/m    Mayo Healy on 4/14/2023 at 10:04 AM

## 2023-04-16 LAB — RENIN PLAS-CCNC: 1.8 NG/ML/HR

## 2023-04-18 LAB — ALDOST SERPL-MCNC: 13.8 NG/DL (ref 0–31)

## 2023-04-20 LAB — ALDOST/RENIN PLAS-RTO: 7.7 {RATIO} (ref 0–25)

## 2023-06-09 ENCOUNTER — TELEPHONE (OUTPATIENT)
Dept: NEPHROLOGY | Facility: CLINIC | Age: 23
End: 2023-06-09
Payer: COMMERCIAL

## 2023-06-09 NOTE — TELEPHONE ENCOUNTER
Call to patient. He prefers mornings an in person visit for follow up. Will call once Dr Chapin's schedule opens up.  Cassi Solo LPN  Nephrology  [phone]

## 2023-06-09 NOTE — TELEPHONE ENCOUNTER
[facility] Call Center    Phone Message    May a detailed message be left on voicemail: yes     Reason for Call: Other: Solomon is calling stating that he is returning call from Cassi (writer has no notes on this). to reschedule 8/17 visit. Writer has no openings in [place] in person. Writer offered VV or MG- Solomon declined both. Solomon wanted to speak directly to Cassi- alfredr unable to transfer for rescheduling and no notes left about speaking to her on this. Solomon then hung up on writer. Please review and call back, thanks.     Action Taken: Message routed to:  [facility] ([facility]    Travel Screening: Not Applicable

## 2023-07-06 DIAGNOSIS — N18.2 CKD (CHRONIC KIDNEY DISEASE), STAGE II: Primary | ICD-10-CM

## 2023-07-10 ENCOUNTER — OFFICE VISIT (OUTPATIENT)
Dept: NEPHROLOGY | Facility: CLINIC | Age: 23
End: 2023-07-10
Attending: INTERNAL MEDICINE
Payer: COMMERCIAL

## 2023-07-10 ENCOUNTER — LAB (OUTPATIENT)
Dept: LAB | Facility: CLINIC | Age: 23
End: 2023-07-10
Payer: COMMERCIAL

## 2023-07-10 VITALS
WEIGHT: 188 LBS | HEART RATE: 54 BPM | DIASTOLIC BLOOD PRESSURE: 73 MMHG | SYSTOLIC BLOOD PRESSURE: 114 MMHG | BODY MASS INDEX: 26.98 KG/M2 | OXYGEN SATURATION: 96 %

## 2023-07-10 DIAGNOSIS — Q61.2 ADPKD (AUTOSOMAL DOMINANT POLYCYSTIC KIDNEY DISEASE): Primary | ICD-10-CM

## 2023-07-10 DIAGNOSIS — N18.2 CKD (CHRONIC KIDNEY DISEASE), STAGE II: ICD-10-CM

## 2023-07-10 LAB
ALBUMIN MFR UR ELPH: 38.3 MG/DL
ALBUMIN SERPL BCG-MCNC: 4.4 G/DL (ref 3.5–5.2)
ALBUMIN UR-MCNC: 30 MG/DL
ANION GAP SERPL CALCULATED.3IONS-SCNC: 9 MMOL/L (ref 7–15)
APPEARANCE UR: CLEAR
BILIRUB UR QL STRIP: NEGATIVE
BUN SERPL-MCNC: 15.6 MG/DL (ref 6–20)
CALCIUM SERPL-MCNC: 9.4 MG/DL (ref 8.6–10)
CHLORIDE SERPL-SCNC: 105 MMOL/L (ref 98–107)
COLOR UR AUTO: ABNORMAL
CREAT SERPL-MCNC: 1.54 MG/DL (ref 0.67–1.17)
CREAT UR-MCNC: 116 MG/DL
DEPRECATED HCO3 PLAS-SCNC: 27 MMOL/L (ref 22–29)
ERYTHROCYTE [DISTWIDTH] IN BLOOD BY AUTOMATED COUNT: 12 % (ref 10–15)
GFR SERPL CREATININE-BSD FRML MDRD: 64 ML/MIN/1.73M2
GLUCOSE SERPL-MCNC: 98 MG/DL (ref 70–99)
GLUCOSE UR STRIP-MCNC: NEGATIVE MG/DL
HCT VFR BLD AUTO: 42.5 % (ref 40–53)
HGB BLD-MCNC: 14.2 G/DL (ref 13.3–17.7)
HGB UR QL STRIP: NEGATIVE
HYALINE CASTS: 1 /LPF
KETONES UR STRIP-MCNC: NEGATIVE MG/DL
LEUKOCYTE ESTERASE UR QL STRIP: NEGATIVE
MCH RBC QN AUTO: 28.9 PG (ref 26.5–33)
MCHC RBC AUTO-ENTMCNC: 33.4 G/DL (ref 31.5–36.5)
MCV RBC AUTO: 87 FL (ref 78–100)
MUCOUS THREADS #/AREA URNS LPF: PRESENT /LPF
NITRATE UR QL: NEGATIVE
PH UR STRIP: 6 [PH] (ref 5–7)
PHOSPHATE SERPL-MCNC: 2.9 MG/DL (ref 2.5–4.5)
PLATELET # BLD AUTO: 227 10E3/UL (ref 150–450)
POTASSIUM SERPL-SCNC: 4 MMOL/L (ref 3.4–5.3)
PROT/CREAT 24H UR: 0.33 MG/MG CR (ref 0–0.2)
RBC # BLD AUTO: 4.91 10E6/UL (ref 4.4–5.9)
RBC URINE: <1 /HPF
SODIUM SERPL-SCNC: 141 MMOL/L (ref 136–145)
SP GR UR STRIP: 1.01 (ref 1–1.03)
SQUAMOUS EPITHELIAL: <1 /HPF
UROBILINOGEN UR STRIP-MCNC: NORMAL MG/DL
WBC # BLD AUTO: 5.6 10E3/UL (ref 4–11)
WBC URINE: 2 /HPF

## 2023-07-10 PROCEDURE — 99213 OFFICE O/P EST LOW 20 MIN: CPT | Performed by: INTERNAL MEDICINE

## 2023-07-10 PROCEDURE — 99214 OFFICE O/P EST MOD 30 MIN: CPT | Performed by: INTERNAL MEDICINE

## 2023-07-10 PROCEDURE — 36415 COLL VENOUS BLD VENIPUNCTURE: CPT | Performed by: PATHOLOGY

## 2023-07-10 PROCEDURE — 81001 URINALYSIS AUTO W/SCOPE: CPT | Performed by: PATHOLOGY

## 2023-07-10 PROCEDURE — 85027 COMPLETE CBC AUTOMATED: CPT | Performed by: PATHOLOGY

## 2023-07-10 PROCEDURE — 80069 RENAL FUNCTION PANEL: CPT | Performed by: PATHOLOGY

## 2023-07-10 PROCEDURE — 84156 ASSAY OF PROTEIN URINE: CPT | Performed by: PATHOLOGY

## 2023-07-10 ASSESSMENT — PAIN SCALES - GENERAL: PAINLEVEL: NO PAIN (0)

## 2023-07-10 NOTE — PROGRESS NOTES
Nephrology Clinic Follow Up  7/10/23    Solomon Wilcox MRN:[id number] YOB: 2000  Date of Admission:(Not on file)  Primary care provider: Brando Yost  Requesting physician: Jayla Chapin, *      REASON FOR CONSULT: ARPKD, CKD stage 3       HISTORY OF PRESENT ILLNESS:    Solomon Wilcox is a 19 year old male with a history of ARPKD, CKD stage II, hypertension who presents with his mother for transfer of service. He was first diagnosed with ARPKD around the time of his birth. Both the mother and father are carriers of the disease. His mother notes her pregnancy with the patient was complicated with an emergency  and a pneumothorax  Liver cysts were discovered at the age of 1. Since then, his kidney function has been around 70% and has remained stable. Patient was very active with sports in high school. He was last evaluated by Dr. Ni in pediatric nephrology on 19 at which time he reported no new kidney concerns. Since then he has been doing well with no hospitalizations and no surgeries. He is not having abdominal or flank pain, headaches, gross hematuria. His energy is good. Patient typically exercises 4-5 times per week, mostly weight training. He is not taking any supplements. No history of kidney stones. No other health concerns. Patient has a history of gallbladder removal and had an averse reaction to the anesthetic. He currently attends college at [employer] and is studying environmental studies.      PAST MEDICAL HISTORY:  Reviewed with patient on 07/10/2023   As per HPI    MEDICATIONS:  Reviewed with the patient in detail    ALLERGIES:    Reviewed with the patient in detail    REVIEW OF SYSTEMS:  A comprehensive of systems was negative except as noted above.    SOCIAL HISTORY:   Reviewed with patient, no smoking and no alcohol use     FAMILY MEDICAL HISTORY:   Reviewed, no family history of need for dialysis, transplant or CKD    PHYSICAL EXAM:   Vital  signs:/73 (BP Location: Right arm, Patient Position: Sitting, Cuff Size: Adult Large)   Pulse 54   Wt 85.3 kg (188 lb)   SpO2 96%   BMI 26.98 kg/m      Physical Exam     Gen: Appears well  Neck: No JVD  Lungs: CTA  CVS: S1S2 normal, no murmurs heard  LE: no edema  Skin: no rashes  CNS: Grossly normal     Interval History: He has been doing well overall. He Checks blood pressures at home and run in 130-140's mm of Hg. He has been running a lot since last summer and has lost a lot of weight. He denies any symptoms today.    Interval history 2/16/23: he denies any new concerns and has been doing great. No new symptoms. Has a cat now which brings him a lot of bessy. He has been checking his blood pressures occasionally at home and have been in the range of high 130's/80-90's. He has been taking all his medications as he should.     7/10/23: He has been doing well overall. [place]. Denies any illnesses or new symptoms. He does drink a lot of water during the day. He has no questions or concerns for today's visit. He has been on hydrochlorothiazide 12.5 mg daily that was added the last time, and his blood pressures have been at goal of < 120/90 mm of Hg, most of the times when he checks them at home.     ASSESSMENT AND RECOMMENDATIONS:     # ARPKD   #CKD stage 2  #Hypertension  #Hepatic fibrosis- Liver function is stable. Following with Dr. 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 Creatinine has been very stable over the past few years, baseline ~ 1.4-1.5 mg/dl .Mild proteinuria. No hematuria. Likely increase in creatinine due to increased muscle mass, he does frequent weight training. His blood pressures at home are currently in the systolics of 130's-140's and he denies any hypervolemia. He remains on Lisinopril 40 mg daily, hydrochlorothiazide 12.5 mg daily and amlodipine 10 mg daily. I did check a renin/adlo ratio and is not concerning for a hyperaldo state.   He does not  have other metabolic complications of CKD.    He will be seeing Dr Goss in Feb 24 and will be getting labs checked at the time. I asked him to check his blood pressures at home and keep a log. If he has any worsening of his creatinine, could consider discontinuing the hydrochlorothiazide.     Follow-up: 1 year    Jayla Chapin MD

## 2023-07-10 NOTE — NURSING NOTE
Chief Complaint   Patient presents with     RECHECK     /73 (BP Location: Right arm, Patient Position: Sitting, Cuff Size: Adult Large)   Pulse 54   Wt 85.3 kg (188 lb)   SpO2 96%   BMI 26.98 kg/m

## 2023-07-10 NOTE — PATIENT INSTRUCTIONS
--good idea to get kidney labs done in Feb when you come to see Dr Goss    Immediate Post OP Note    PreOp Diagnosis: Recurrent incisional hernia    PostOp Diagnosis: Same    Procedure(s):  REPAIR, HERNIA, VENTRAL, ROBOT-ASSISTED, USING DA LUCINDA XI - RECURRENT REDUCIBLE INCISIONAL INCLUDING MESH INSERTION - Wound Class: Clean    Surgeon(s):  John H Ganser, M.D.    Anesthesiologist/Type of Anesthesia:  Anesthesiologist: Brant Tompkins M.D./General    Surgical Staff:  Circulator: America Scruggs R.N.  Scrub Person: Sia Tilley; Sid Lazcano  First Assist: AMINAH Boucher    Specimens removed if any:  * No specimens in log *    Estimated Blood Loss: 0    Findings: 0    Complications: 0        1/25/2021 4:34 PM John H Ganser, M.D.

## 2023-07-10 NOTE — LETTER
7/10/2023       RE: Solomon Wilcox  [address]  [facility] [address]     Dear Colleague,    Thank you for referring your patient, Solomon Wilcox, to the [facility] [place] at [facility]. Please see a copy of my visit note below.      Nephrology Clinic Follow Up  7/10/23    Solomon Wilcox MRN:[id number] YOB: 2000  Date of Admission:(Not on file)  Primary care provider: Brando Yost  Requesting physician: Jayla Chapin, *      REASON FOR CONSULT: ARPKD, CKD stage 3       HISTORY OF PRESENT ILLNESS:    Solomon Wilcox is a 19 year old male with a history of ARPKD, CKD stage II, hypertension who presents with his mother for transfer of service. He was first diagnosed with ARPKD around the time of his birth. Both the mother and father are carriers of the disease. His mother notes her pregnancy with the patient was complicated with an emergency  and a pneumothorax  Liver cysts were discovered at the age of 1. Since then, his kidney function has been around 70% and has remained stable. Patient was very active with sports in high school. He was last evaluated by Dr. Ni in pediatric nephrology on 19 at which time he reported no new kidney concerns. Since then he has been doing well with no hospitalizations and no surgeries. He is not having abdominal or flank pain, headaches, gross hematuria. His energy is good. Patient typically exercises 4-5 times per week, mostly weight training. He is not taking any supplements. No history of kidney stones. No other health concerns. Patient has a history of gallbladder removal and had an averse reaction to the anesthetic. He currently attends college at [employer] and is studying environmental studies.      PAST MEDICAL HISTORY:  Reviewed with patient on 07/10/2023   As per HPI    MEDICATIONS:  Reviewed with the patient in detail    ALLERGIES:     Reviewed with the patient in detail    REVIEW OF SYSTEMS:  A comprehensive of systems was negative except as noted above.    SOCIAL HISTORY:   Reviewed with patient, no smoking and no alcohol use     FAMILY MEDICAL HISTORY:   Reviewed, no family history of need for dialysis, transplant or CKD    PHYSICAL EXAM:   Vital signs:/73 (BP Location: Right arm, Patient Position: Sitting, Cuff Size: Adult Large)   Pulse 54   Wt 85.3 kg (188 lb)   SpO2 96%   BMI 26.98 kg/m      Physical Exam     Gen: Appears well  Neck: No JVD  Lungs: CTA  CVS: S1S2 normal, no murmurs heard  LE: no edema  Skin: no rashes  CNS: Grossly normal     Interval History: He has been doing well overall. He Checks blood pressures at home and run in 130-140's mm of Hg. He has been running a lot since last summer and has lost a lot of weight. He denies any symptoms today.    Interval history 2/16/23: he denies any new concerns and has been doing great. No new symptoms. Has a cat now which brings him a lot of bessy. He has been checking his blood pressures occasionally at home and have been in the range of high 130's/80-90's. He has been taking all his medications as he should.     7/10/23: He has been doing well overall. [place]. Denies any illnesses or new symptoms. He does drink a lot of water during the day. He has no questions or concerns for today's visit. He has been on hydrochlorothiazide 12.5 mg daily that was added the last time, and his blood pressures have been at goal of < 120/90 mm of Hg, most of the times when he checks them at home.     ASSESSMENT AND RECOMMENDATIONS:     # ARPKD   #CKD stage 2  #Hypertension  #Hepatic fibrosis- Liver function is stable. Following with Dr. 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 Creatinine has been very stable over the past few years, baseline ~ 1.4-1.5 mg/dl .Mild proteinuria. No hematuria. Likely increase in creatinine due to increased muscle mass, he does  frequent weight training. His blood pressures at home are currently in the systolics of 130's-140's and he denies any hypervolemia. He remains on Lisinopril 40 mg daily, hydrochlorothiazide 12.5 mg daily and amlodipine 10 mg daily. I did check a renin/adlo ratio and is not concerning for a hyperaldo state.   He does not have other metabolic complications of CKD.    He will be seeing Dr Goss in Feb 24 and will be getting labs checked at the time. I asked him to check his blood pressures at home and keep a log. If he has any worsening of his creatinine, could consider discontinuing the hydrochlorothiazide.     Follow-up: 1 year    Jayla Chapin MD

## 2023-10-11 ENCOUNTER — TELEPHONE (OUTPATIENT)
Dept: GASTROENTEROLOGY | Facility: CLINIC | Age: 23
End: 2023-10-11
Payer: COMMERCIAL

## 2023-10-11 NOTE — TELEPHONE ENCOUNTER
Called patient (1st attempt) for the patient to call back and schedule the following:    Appointment type: RGL  Provider: [facility]  Return date: 1/30/24  Specialty phone number: [phone]  Additional appointment(s) needed: NA  Additonal Notes: RESCHEDULE NEEDED. Using Schedules view, schedule pt in held slot [facility] in Jan or Feb.

## 2023-10-19 ENCOUNTER — TELEPHONE (OUTPATIENT)
Dept: GASTROENTEROLOGY | Facility: CLINIC | Age: 23
End: 2023-10-19
Payer: COMMERCIAL

## 2023-12-05 ENCOUNTER — MEDICAL CORRESPONDENCE (OUTPATIENT)
Dept: HEALTH INFORMATION MANAGEMENT | Facility: CLINIC | Age: 23
End: 2023-12-05
Payer: COMMERCIAL

## 2023-12-07 ENCOUNTER — TRANSCRIBE ORDERS (OUTPATIENT)
Dept: OTHER | Age: 23
End: 2023-12-07

## 2023-12-07 DIAGNOSIS — Q61.19 CONGENITAL POLYCYSTIC KIDNEY, AUTOSOMAL RECESSIVE: Primary | ICD-10-CM

## 2023-12-08 ENCOUNTER — TRANSCRIBE ORDERS (OUTPATIENT)
Dept: OTHER | Age: 23
End: 2023-12-08

## 2023-12-08 DIAGNOSIS — Q44.70 CONGENITAL ANOMALY OF GALLBLADDER, BILE DUCTS, AND LIVER: Primary | ICD-10-CM

## 2023-12-08 DIAGNOSIS — Q44.1 CONGENITAL ANOMALY OF GALLBLADDER, BILE DUCTS, AND LIVER: Primary | ICD-10-CM

## 2023-12-08 DIAGNOSIS — Q44.5 CONGENITAL ANOMALY OF GALLBLADDER, BILE DUCTS, AND LIVER: Primary | ICD-10-CM

## 2023-12-19 ENCOUNTER — TELEPHONE (OUTPATIENT)
Dept: GASTROENTEROLOGY | Facility: CLINIC | Age: 23
End: 2023-12-19
Payer: COMMERCIAL

## 2023-12-19 NOTE — TELEPHONE ENCOUNTER
LVM; pt to reschedule appt, move to Bonny Richards PA-C, per provider; second attempt- KB 12.19.23//

## 2024-01-23 DIAGNOSIS — Q61.2 ADPKD (AUTOSOMAL DOMINANT POLYCYSTIC KIDNEY DISEASE): Primary | ICD-10-CM

## 2024-01-25 ENCOUNTER — TELEPHONE (OUTPATIENT)
Dept: GASTROENTEROLOGY | Facility: CLINIC | Age: 24
End: 2024-01-25
Payer: COMMERCIAL

## 2024-01-25 NOTE — TELEPHONE ENCOUNTER
Was the patient contacted by phone and reminded of the upcoming visit? message left    Was the patient instructed to bring a current list of all medications to the appointment or instructed to bring in all medication bottles? Yes     Was the patient instructed to arrive prior to the appointment time to have ordered labs drawn? Yes     Were the needed lab orders placed? Yes    Was lab appointment made 1 hour or more prior to visit? No, writer made appointment and left vm to arrive at 7 for labs or he can go to any [facility] up to 2 days prior to  visit to have drawn.     Nikki Bronson, [facility]  1/25/2024 12:02 PM

## 2024-01-31 ENCOUNTER — LAB (OUTPATIENT)
Dept: LAB | Facility: CLINIC | Age: 24
End: 2024-01-31
Attending: PHYSICIAN ASSISTANT
Payer: COMMERCIAL

## 2024-01-31 ENCOUNTER — OFFICE VISIT (OUTPATIENT)
Dept: GASTROENTEROLOGY | Facility: CLINIC | Age: 24
End: 2024-01-31
Attending: PHYSICIAN ASSISTANT
Payer: COMMERCIAL

## 2024-01-31 VITALS
HEIGHT: 70 IN | OXYGEN SATURATION: 97 % | WEIGHT: 200 LBS | BODY MASS INDEX: 28.63 KG/M2 | DIASTOLIC BLOOD PRESSURE: 85 MMHG | SYSTOLIC BLOOD PRESSURE: 146 MMHG | RESPIRATION RATE: 16 BRPM | TEMPERATURE: 97.9 F | HEART RATE: 55 BPM

## 2024-01-31 DIAGNOSIS — Q61.2 ADPKD (AUTOSOMAL DOMINANT POLYCYSTIC KIDNEY DISEASE): ICD-10-CM

## 2024-01-31 PROBLEM — E23.0 PITUITARY DWARFISM (H): Status: ACTIVE | Noted: 2024-01-31

## 2024-01-31 PROBLEM — R46.89 DEFIANT BEHAVIOR: Status: ACTIVE | Noted: 2024-01-31

## 2024-01-31 PROBLEM — F41.1 ANXIETY STATE: Status: ACTIVE | Noted: 2024-01-31

## 2024-01-31 PROBLEM — F91.3 OPPOSITIONAL DEFIANT DISORDER: Status: ACTIVE | Noted: 2024-01-31

## 2024-01-31 PROBLEM — F42.9 OBSESSIVE-COMPULSIVE DISORDER: Status: ACTIVE | Noted: 2024-01-31

## 2024-01-31 PROBLEM — F39 MOOD DISORDER (H): Status: ACTIVE | Noted: 2024-01-31

## 2024-01-31 PROBLEM — R16.1 SPLENOMEGALY: Status: ACTIVE | Noted: 2024-01-31

## 2024-01-31 PROBLEM — Q87.89: Status: ACTIVE | Noted: 2024-01-31

## 2024-01-31 PROBLEM — F90.9 ATTENTION DEFICIT HYPERACTIVITY DISORDER (ADHD): Status: ACTIVE | Noted: 2024-01-31

## 2024-01-31 LAB
ALBUMIN MFR UR ELPH: 78.8 MG/DL
ALBUMIN SERPL BCG-MCNC: 4.5 G/DL (ref 3.5–5.2)
ALBUMIN UR-MCNC: 70 MG/DL
ALP SERPL-CCNC: 54 U/L (ref 40–150)
ALT SERPL W P-5'-P-CCNC: 16 U/L (ref 0–70)
ANION GAP SERPL CALCULATED.3IONS-SCNC: 10 MMOL/L (ref 7–15)
APPEARANCE UR: CLEAR
AST SERPL W P-5'-P-CCNC: 16 U/L (ref 0–45)
BILIRUB DIRECT SERPL-MCNC: <0.2 MG/DL (ref 0–0.3)
BILIRUB SERPL-MCNC: 0.6 MG/DL
BILIRUB UR QL STRIP: NEGATIVE
BUN SERPL-MCNC: 13.3 MG/DL (ref 6–20)
CALCIUM SERPL-MCNC: 9.3 MG/DL (ref 8.6–10)
CHLORIDE SERPL-SCNC: 105 MMOL/L (ref 98–107)
COLOR UR AUTO: ABNORMAL
CREAT SERPL-MCNC: 1.51 MG/DL (ref 0.67–1.17)
CREAT UR-MCNC: 145 MG/DL
CREAT UR-MCNC: 146 MG/DL
DEPRECATED HCO3 PLAS-SCNC: 27 MMOL/L (ref 22–29)
EGFRCR SERPLBLD CKD-EPI 2021: 65 ML/MIN/1.73M2
ERYTHROCYTE [DISTWIDTH] IN BLOOD BY AUTOMATED COUNT: 12.3 % (ref 10–15)
GLUCOSE SERPL-MCNC: 108 MG/DL (ref 70–99)
GLUCOSE UR STRIP-MCNC: NEGATIVE MG/DL
HCT VFR BLD AUTO: 45 % (ref 40–53)
HGB BLD-MCNC: 15 G/DL (ref 13.3–17.7)
HGB UR QL STRIP: NEGATIVE
INR PPP: 1.07 (ref 0.85–1.15)
KETONES UR STRIP-MCNC: NEGATIVE MG/DL
LEUKOCYTE ESTERASE UR QL STRIP: NEGATIVE
MCH RBC QN AUTO: 29.1 PG (ref 26.5–33)
MCHC RBC AUTO-ENTMCNC: 33.3 G/DL (ref 31.5–36.5)
MCV RBC AUTO: 87 FL (ref 78–100)
MICROALBUMIN UR-MCNC: 541 MG/L
MICROALBUMIN/CREAT UR: 373.1 MG/G CR (ref 0–17)
MUCOUS THREADS #/AREA URNS LPF: PRESENT /LPF
NITRATE UR QL: NEGATIVE
PH UR STRIP: 6 [PH] (ref 5–7)
PHOSPHATE SERPL-MCNC: 4.5 MG/DL (ref 2.5–4.5)
PLATELET # BLD AUTO: 255 10E3/UL (ref 150–450)
POTASSIUM SERPL-SCNC: 4.1 MMOL/L (ref 3.4–5.3)
PROT SERPL-MCNC: 7.4 G/DL (ref 6.4–8.3)
PROT/CREAT 24H UR: 0.54 MG/MG CR (ref 0–0.2)
RBC # BLD AUTO: 5.15 10E6/UL (ref 4.4–5.9)
RBC URINE: 1 /HPF
SODIUM SERPL-SCNC: 142 MMOL/L (ref 135–145)
SP GR UR STRIP: 1.01 (ref 1–1.03)
SQUAMOUS EPITHELIAL: <1 /HPF
UROBILINOGEN UR STRIP-MCNC: NORMAL MG/DL
WBC # BLD AUTO: 6.2 10E3/UL (ref 4–11)
WBC URINE: 7 /HPF

## 2024-01-31 PROCEDURE — 80053 COMPREHEN METABOLIC PANEL: CPT | Performed by: PATHOLOGY

## 2024-01-31 PROCEDURE — 99213 OFFICE O/P EST LOW 20 MIN: CPT | Performed by: PHYSICIAN ASSISTANT

## 2024-01-31 PROCEDURE — 99000 SPECIMEN HANDLING OFFICE-LAB: CPT | Performed by: PATHOLOGY

## 2024-01-31 PROCEDURE — 84100 ASSAY OF PHOSPHORUS: CPT | Performed by: PATHOLOGY

## 2024-01-31 PROCEDURE — 36415 COLL VENOUS BLD VENIPUNCTURE: CPT | Performed by: PATHOLOGY

## 2024-01-31 PROCEDURE — 84156 ASSAY OF PROTEIN URINE: CPT | Performed by: PATHOLOGY

## 2024-01-31 PROCEDURE — 82248 BILIRUBIN DIRECT: CPT | Performed by: PATHOLOGY

## 2024-01-31 PROCEDURE — 85027 COMPLETE CBC AUTOMATED: CPT | Performed by: PATHOLOGY

## 2024-01-31 PROCEDURE — 82043 UR ALBUMIN QUANTITATIVE: CPT | Performed by: INTERNAL MEDICINE

## 2024-01-31 PROCEDURE — 81001 URINALYSIS AUTO W/SCOPE: CPT | Performed by: PATHOLOGY

## 2024-01-31 PROCEDURE — 99214 OFFICE O/P EST MOD 30 MIN: CPT | Mod: 25 | Performed by: PHYSICIAN ASSISTANT

## 2024-01-31 PROCEDURE — 85610 PROTHROMBIN TIME: CPT | Performed by: PATHOLOGY

## 2024-01-31 ASSESSMENT — PAIN SCALES - GENERAL: PAINLEVEL: NO PAIN (0)

## 2024-01-31 NOTE — PROGRESS NOTES
"Hepatology Clinic Note  Solomon Wilcox   Date of Birth 2000    REASON FOR FOLLOW UP: Congenital hepatic fibrosis   REFERRING PROVIDER: Brando Yost         Assessment/plan:   Solomon Wilcox is a 22 YO M with congenital hepatic fibrosis and polycystic kidney disease. Last seen in Hepatology clinic by Dr. Goss 4/14/23.    Continues to be no signs of portal HTN. INR, PLT, LFTs, alk phos, tbili and albumin all WNL. Kidney function has remained quite stable with an estimated GFR of ~60-70.  Continues to have a modest amount of proteinuria.  Does follow with Nephrology. Next appt with Neph is 3/18/24.     Most recent imaging of liver was with US in 2019. Do not see a hx of fibroscan or elastography study.     He is up to date with regard to vaccines. With normal liver function on labs, will hold off on consideration of endoscopy or ultrasound at this point in time.     Recommended doing fibroscan today to know baseline fibrosis level but patient deferred as he has to \"get to work\".     Follow-up in clinic with Dr. Goss in 6 months.     Bonny Richards PA-C  [facility]at[facility]   -----------------------------------------------------         HPI:   Solomon Wilcox is a 22 YO M returns for follow up of congenital hepatic fibrosis. Also has polycystic kidney disease but has never required kidney transplantation or dialysis.    Patient tells me he has been doing well as of late.  Denies known family history of liver disease.  Does not believe he has ever had a liver biopsy.  No recent fevers, chills, nausea, vomiting or abdominal pain.  Denies any urinary symptoms.  No chest pain or shortness of breath.  Appetite is good.  Weight has been relatively stable.  Since December 2023, has been more consistent about going to the gym routinely.  No lower extremity edema.  No sensation of fluid in his abdomen.  No significant confusion.  No prior chills.  No melena.    No recent changes in the dosages of his " medications.  Tells me he recently started taking fish oil November 2023.    Denies abdominal surgical history other than cholecystectomy.    PMH:    has a past medical history of Autosomal recessive polycystic kidney disease, CKD (chronic kidney disease), stage II, Drug reaction (10/24/14), Liver disease, cystic, congenital, Short stature, and Unspecified hypertensive kidney disease with chronic kidney disease stage I through stage IV, or unspecified(403.90).     SMH:    has a past surgical history that includes Nose surgery and Laparoscopic cholecystectomy (N/A, 10/24/2014).     Medications:   Current Outpatient Medications   Medication    amLODIPine (NORVASC) 10 MG tablet    cholecalciferol (VITAMIN  -D) 1000 UNITS capsule    cyanocolbalamin (VITAMIN  B-12) 500 MCG tablet    hydrochlorothiazide (HYDRODIURIL) 12.5 MG tablet    lisinopril (ZESTRIL) 40 MG tablet    ursodiol (ACTIGALL) 300 MG capsule    guanFACINE HCl (INTUNIV) 3 MG TB24 24 hr tablet     Current Facility-Administered Medications   Medication    EPINEPHrine (ADRENALIN) kit 0.3 mg     Recent Labs   Lab Test 01/31/24  0648 04/14/23  0934 02/14/22  1331 02/08/21  1544 05/05/20  1021 02/03/20  1003 07/25/19  0902 02/01/19  1012 08/23/18  0840 12/22/17  1207   ALKPHOS 54 51 50 68 53 70 78 88 74 73   ALT 16 19 26 31 37 31 63 93* 32 34   AST 16 17 18 13 18 11 26 35 19 16   BILITOTAL 0.6 0.3 0.9 0.8 0.5 0.8 0.3 0.7 0.6 0.4           Allergies:     Allergies   Allergen Reactions    No Clinical Screening - See Comments      Pt with hx of polycystic kidney disease  Please avoid ibuprofen          Social History:     Social History     Socioeconomic History    Marital status: Single     Spouse name: Not on file    Number of children: Not on file    Years of education: Not on file    Highest education level: Not on file   Occupational History    Not on file   Tobacco Use    Smoking status: Never    Smokeless tobacco: Never   Vaping Use    Vaping Use: Never used  "  Substance and Sexual Activity    Alcohol use: Yes     Comment: occ    Drug use: No    Sexual activity: Not on file   Other Topics Concern    Not on file   Social History Narrative    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  He lives at home with his parents and younger sister in the Summer. He will be a senior at [employer] next Fall.      Social Determinants of Health     Financial Resource Strain: Not on file   Food Insecurity: Not on file   Transportation Needs: Not on file   Physical Activity: Not on file   Stress: Not on file   Social Connections: Not on file   Interpersonal Safety: Not on file   Housing Stability: Not on file          Family History:     Family History   Problem Relation Age of Onset    Hypertension Father         started in his 20s    Lipids Father         high cholesterol    Myocardial Infarction Paternal Grandfather           Review of Systems:   Gen: See HPI          Physical Exam:   VS:  BP (!) 146/85 (BP Location: Left arm, Patient Position: Sitting, Cuff Size: Adult Regular)   Pulse 55   Temp 97.9  F (36.6  C) (Oral)   Resp 16   Ht 1.778 m (5' 10\")   Wt 90.7 kg (200 lb)   SpO2 97%   BMI 28.70 kg/m      Gen: A&Ox3, NAD, well developed  HEENT: Sclera non-icteric  CV: RRR, no overt murmurs  Lung: CTA posteriorly, no wheezing or crackles.   Abd: soft, NT, ND, no palpable splenomegaly, liver is not palpable.   Ext: no edema, intact pulses.   Skin: No rash or jaundice  Neuro: grossly intact  Psych: appropriate mood and affects         Data:   Reviewed in person and significant for:    Lab Results   Component Value Date     01/31/2024     02/08/2021      Lab Results   Component Value Date    POTASSIUM 4.1 01/31/2024    POTASSIUM 3.7 03/31/2022    POTASSIUM 3.7 02/08/2021     Lab Results   Component Value Date    CHLORIDE 105 01/31/2024    CHLORIDE 107 03/31/2022    CHLORIDE 107 02/08/2021     Lab Results   Component Value Date    CO2 27 " 01/31/2024    CO2 28 03/31/2022    CO2 28 02/08/2021     Lab Results   Component Value Date    BUN 13.3 01/31/2024    BUN 19 03/31/2022    BUN 16 02/08/2021     Lab Results   Component Value Date    CR 1.51 01/31/2024    CR 1.25 02/08/2021     Lab Results   Component Value Date    WBC 6.2 01/31/2024    WBC 6.8 01/21/2021     Lab Results   Component Value Date    HGB 15.0 01/31/2024    HGB 16.2 01/21/2021     Lab Results   Component Value Date    HCT 45.0 01/31/2024    HCT 48.8 01/21/2021     Lab Results   Component Value Date    MCV 87 01/31/2024    MCV 88 01/21/2021     Lab Results   Component Value Date     01/31/2024     01/21/2021     Lab Results   Component Value Date    AST 16 01/31/2024    AST 13 02/08/2021     Lab Results   Component Value Date    ALT 16 01/31/2024    ALT 31 02/08/2021     Lab Results   Component Value Date    BILICONJ 0.0 02/14/2013      Lab Results   Component Value Date    BILITOTAL 0.6 01/31/2024    BILITOTAL 0.8 02/08/2021       Lab Results   Component Value Date    ALBUMIN 4.5 01/31/2024    ALBUMIN 4.5 03/31/2022    ALBUMIN 4.2 02/08/2021     Lab Results   Component Value Date    PROTTOTAL 7.4 01/31/2024    PROTTOTAL 8.0 02/08/2021      Lab Results   Component Value Date    ALKPHOS 54 01/31/2024    ALKPHOS 68 02/08/2021       Lab Results   Component Value Date    INR 1.07 01/31/2024    INR 1.02 02/08/2021       Imaging:      US ABDOMEN COMPLETE WITH DOPPLER COMPLETE  7/25/2019     CLINICAL HISTORY: ARPKC and Caroli - please perform US with Doppler  and compare to prior; Caroli disease     COMPARISON: Abdominal ultrasound 8/23/2010         TECHNIQUE: The abdomen was scanned in standard fashion with  specialized ultrasound transducer(s) using both gray-scale, color  Doppler, and spectral flow techniques.     FINDINGS:  Abdominal ultrasound exam with color and spectral Doppler. The liver  measures 20.0 cm (previously 20.0 cm) and is normal in contour and  echogenicity.  Cystic ductal dilatation redemonstrated as on previous  exams measuring 7.9 x 6.2 x 5.7 cm and 6.5 x 3.6 x 3.7 cm. Stable from  prior. Mildly dilated extrahepatic bile duct measuring 5 mm. There is  no intrahepatic or extrahepatic biliary ductal dilatation. The common  bile duct measures 2.8 mm. The gallbladder has been surgically  removed.     Extrahepatic portal vein flow is antegrade, measuring 21.4 cm/sec.  Right portal vein flow is antegrade, measuring 23.8 cm/sec.  Left portal vein flow is antegrade, measuring 7.9 cm/sec.     Flow in the hepatic artery is towards the liver and:  103 cm/s peak systolic  0.50 resistive index.      The splenic vein is patent and flow is towards the liver.  The left,  middle, and right hepatic veins are patent with flow towards the IVC.  The IVC is patent with flow towards the heart.   The visualized aorta  is not dilated.     The spleen measures maximally 11.9 cm (12.5 cm) and is normal in  appearance. The visualized portions of the pancreas are normal in  echogenicity.     The visualized upper abdominal aorta and inferior vena cava are  normal.       The kidneys are normal in position with increased echogenicity and  through transmission compared with prior. The right kidney measures  12.9 cm (previously 12.0 cm) and the left kidney measures 11.8 cm  (previously 10.6 cm). There is no significant urinary tract dilation.  The urinary bladder is moderately distended and normal in morphology.  The bladder wall is normal.                                                                      IMPRESSION:   1. Intrahepatic ductal dilatation, stable from prior exam. Doppler  evaluation as normal.  2. Polycystic kidney disease.     I have personally reviewed the examination and initial interpretation  and I agree with the findings.     SHERINE ESPARZA MD

## 2024-01-31 NOTE — LETTER
"    1/31/2024         RE: Solomon Wilcox  [address]  [place] [place] [address]        Dear Colleague,    Thank you for referring your patient, Solomon Wilcox, to the [facility] [place]. Please see a copy of my visit note below.    Hepatology Clinic Note  Solomon Wilcox   Date of Birth 2000    REASON FOR FOLLOW UP: Congenital hepatic fibrosis   REFERRING PROVIDER: Brando Yost         Assessment/plan:   Solomon Wilcox is a 24 YO M with congenital hepatic fibrosis and polycystic kidney disease. Last seen in Hepatology clinic by Dr. Goss 4/14/23.    Continues to be no signs of portal HTN. INR, PLT, LFTs, alk phos, tbili and albumin all WNL. Kidney function has remained quite stable with an estimated GFR of ~60-70.  Continues to have a modest amount of proteinuria.  Does follow with Nephrology. Next appt with Neph is 3/18/24.     Most recent imaging of liver was with US in 2019. Do not see a hx of fibroscan or elastography study.     He is up to date with regard to vaccines. With normal liver function on labs, will hold off on consideration of endoscopy or ultrasound at this point in time.     Recommended doing fibroscan today to know baseline fibrosis level but patient deferred as he has to \"get to work\".     Follow-up in clinic with Dr. Goss in 6 months.     Bonny Richards PA-C  [facility] Hepat[facility]   -----------------------------------------------------         HPI:   Solomon Wilcox is a 24 YO M returns for follow up of congenital hepatic fibrosis. Also has polycystic kidney disease but has never required kidney transplantation or dialysis.    Patient tells me he has been doing well as of late.  Denies known family history of liver disease.  Does not believe he has ever had a liver biopsy.  No recent fevers, chills, nausea, vomiting or abdominal pain.  Denies any urinary symptoms.  No chest pain or shortness of breath.  Appetite is good.  Weight has been " relatively stable.  Since December 2023, has been more consistent about going to the gym routinely.  No lower extremity edema.  No sensation of fluid in his abdomen.  No significant confusion.  No prior chills.  No melena.    No recent changes in the dosages of his medications.  Tells me he recently started taking fish oil November 2023.    Denies abdominal surgical history other than cholecystectomy.    PMH:    has a past medical history of Autosomal recessive polycystic kidney disease, CKD (chronic kidney disease), stage II, Drug reaction (10/24/14), Liver disease, cystic, congenital, Short stature, and Unspecified hypertensive kidney disease with chronic kidney disease stage I through stage IV, or unspecified(403.90).     SMH:    has a past surgical history that includes Nose surgery and Laparoscopic cholecystectomy (N/A, 10/24/2014).     Medications:   Current Outpatient Medications   Medication    amLODIPine (NORVASC) 10 MG tablet    cholecalciferol (VITAMIN  -D) 1000 UNITS capsule    cyanocolbalamin (VITAMIN  B-12) 500 MCG tablet    hydrochlorothiazide (HYDRODIURIL) 12.5 MG tablet    lisinopril (ZESTRIL) 40 MG tablet    ursodiol (ACTIGALL) 300 MG capsule    guanFACINE HCl (INTUNIV) 3 MG TB24 24 hr tablet     Current Facility-Administered Medications   Medication    EPINEPHrine (ADRENALIN) kit 0.3 mg     Recent Labs   Lab Test 01/31/24  0648 04/14/23  0934 02/14/22  1331 02/08/21  1544 05/05/20  1021 02/03/20  1003 07/25/19  0902 02/01/19  1012 08/23/18  0840 12/22/17  1207   ALKPHOS 54 51 50 68 53 70 78 88 74 73   ALT 16 19 26 31 37 31 63 93* 32 34   AST 16 17 18 13 18 11 26 35 19 16   BILITOTAL 0.6 0.3 0.9 0.8 0.5 0.8 0.3 0.7 0.6 0.4           Allergies:     Allergies   Allergen Reactions    No Clinical Screening - See Comments      Pt with hx of polycystic kidney disease  Please avoid ibuprofen          Social History:     Social History     Socioeconomic History    Marital status: Single     Spouse name:  "Not on file    Number of children: Not on file    Years of education: Not on file    Highest education level: Not on file   Occupational History    Not on file   Tobacco Use    Smoking status: Never    Smokeless tobacco: Never   Vaping Use    Vaping Use: Never used   Substance and Sexual Activity    Alcohol use: Yes     Comment: occ    Drug use: No    Sexual activity: Not on file   Other Topics Concern    Not on file   Social History Narrative    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  He lives at home with his parents and younger sister in the Summer. He will be a senior at [employer] next Fall.      Social Determinants of Health     Financial Resource Strain: Not on file   Food Insecurity: Not on file   Transportation Needs: Not on file   Physical Activity: Not on file   Stress: Not on file   Social Connections: Not on file   Interpersonal Safety: Not on file   Housing Stability: Not on file          Family History:     Family History   Problem Relation Age of Onset    Hypertension Father         started in his 20s    Lipids Father         high cholesterol    Myocardial Infarction Paternal Grandfather           Review of Systems:   Gen: See HPI          Physical Exam:   VS:  BP (!) 146/85 (BP Location: Left arm, Patient Position: Sitting, Cuff Size: Adult Regular)   Pulse 55   Temp 97.9  F (36.6  C) (Oral)   Resp 16   Ht 1.778 m (5' 10\")   Wt 90.7 kg (200 lb)   SpO2 97%   BMI 28.70 kg/m      Gen: A&Ox3, NAD, well developed  HEENT: Sclera non-icteric  CV: RRR, no overt murmurs  Lung: CTA posteriorly, no wheezing or crackles.   Abd: soft, NT, ND, no palpable splenomegaly, liver is not palpable.   Ext: no edema, intact pulses.   Skin: No rash or jaundice  Neuro: grossly intact  Psych: appropriate mood and affects         Data:   Reviewed in person and significant for:    Lab Results   Component Value Date     01/31/2024     02/08/2021      Lab Results   Component " Value Date    POTASSIUM 4.1 01/31/2024    POTASSIUM 3.7 03/31/2022    POTASSIUM 3.7 02/08/2021     Lab Results   Component Value Date    CHLORIDE 105 01/31/2024    CHLORIDE 107 03/31/2022    CHLORIDE 107 02/08/2021     Lab Results   Component Value Date    CO2 27 01/31/2024    CO2 28 03/31/2022    CO2 28 02/08/2021     Lab Results   Component Value Date    BUN 13.3 01/31/2024    BUN 19 03/31/2022    BUN 16 02/08/2021     Lab Results   Component Value Date    CR 1.51 01/31/2024    CR 1.25 02/08/2021     Lab Results   Component Value Date    WBC 6.2 01/31/2024    WBC 6.8 01/21/2021     Lab Results   Component Value Date    HGB 15.0 01/31/2024    HGB 16.2 01/21/2021     Lab Results   Component Value Date    HCT 45.0 01/31/2024    HCT 48.8 01/21/2021     Lab Results   Component Value Date    MCV 87 01/31/2024    MCV 88 01/21/2021     Lab Results   Component Value Date     01/31/2024     01/21/2021     Lab Results   Component Value Date    AST 16 01/31/2024    AST 13 02/08/2021     Lab Results   Component Value Date    ALT 16 01/31/2024    ALT 31 02/08/2021     Lab Results   Component Value Date    BILICONJ 0.0 02/14/2013      Lab Results   Component Value Date    BILITOTAL 0.6 01/31/2024    BILITOTAL 0.8 02/08/2021       Lab Results   Component Value Date    ALBUMIN 4.5 01/31/2024    ALBUMIN 4.5 03/31/2022    ALBUMIN 4.2 02/08/2021     Lab Results   Component Value Date    PROTTOTAL 7.4 01/31/2024    PROTTOTAL 8.0 02/08/2021      Lab Results   Component Value Date    ALKPHOS 54 01/31/2024    ALKPHOS 68 02/08/2021       Lab Results   Component Value Date    INR 1.07 01/31/2024    INR 1.02 02/08/2021       Imaging:      US ABDOMEN COMPLETE WITH DOPPLER COMPLETE  7/25/2019     CLINICAL HISTORY: ARRIA and Carmeni - please perform US with Doppler  and compare to prior; Caroli disease     COMPARISON: Abdominal ultrasound 8/23/2010         TECHNIQUE: The abdomen was scanned in standard fashion with  specialized  ultrasound transducer(s) using both gray-scale, color  Doppler, and spectral flow techniques.     FINDINGS:  Abdominal ultrasound exam with color and spectral Doppler. The liver  measures 20.0 cm (previously 20.0 cm) and is normal in contour and  echogenicity. Cystic ductal dilatation redemonstrated as on previous  exams measuring 7.9 x 6.2 x 5.7 cm and 6.5 x 3.6 x 3.7 cm. Stable from  prior. Mildly dilated extrahepatic bile duct measuring 5 mm. There is  no intrahepatic or extrahepatic biliary ductal dilatation. The common  bile duct measures 2.8 mm. The gallbladder has been surgically  removed.     Extrahepatic portal vein flow is antegrade, measuring 21.4 cm/sec.  Right portal vein flow is antegrade, measuring 23.8 cm/sec.  Left portal vein flow is antegrade, measuring 7.9 cm/sec.     Flow in the hepatic artery is towards the liver and:  103 cm/s peak systolic  0.50 resistive index.      The splenic vein is patent and flow is towards the liver.  The left,  middle, and right hepatic veins are patent with flow towards the IVC.  The IVC is patent with flow towards the heart.   The visualized aorta  is not dilated.     The spleen measures maximally 11.9 cm (12.5 cm) and is normal in  appearance. The visualized portions of the pancreas are normal in  echogenicity.     The visualized upper abdominal aorta and inferior vena cava are  normal.       The kidneys are normal in position with increased echogenicity and  through transmission compared with prior. The right kidney measures  12.9 cm (previously 12.0 cm) and the left kidney measures 11.8 cm  (previously 10.6 cm). There is no significant urinary tract dilation.  The urinary bladder is moderately distended and normal in morphology.  The bladder wall is normal.                                                                      IMPRESSION:   1. Intrahepatic ductal dilatation, stable from prior exam. Doppler  evaluation as normal.  2. Polycystic kidney disease.     I  have personally reviewed the examination and initial interpretation  and I agree with the findings.     MD Bonny NOVOA PA-C

## 2024-01-31 NOTE — NURSING NOTE
"Chief Complaint   Patient presents with    RECHECK     Congenital hepatic fibrosis      Vital signs:  Temp: 97.9  F (36.6  C) Temp src: Oral BP: (!) 146/85 Pulse: 55   Resp: 16 SpO2: 97 %     Height: 177.8 cm (5' 10\") Weight: 90.7 kg (200 lb)  Estimated body mass index is 28.7 kg/m  as calculated from the following:    Height as of this encounter: 1.778 m (5' 10\").    Weight as of this encounter: 90.7 kg (200 lb).      Nikki Bronson, [facility]  1/31/2024 7:42 AM    "

## 2024-02-22 DIAGNOSIS — Q61.2 ADPKD (AUTOSOMAL DOMINANT POLYCYSTIC KIDNEY DISEASE): Primary | ICD-10-CM

## 2024-02-23 ENCOUNTER — TELEPHONE (OUTPATIENT)
Dept: NEPHROLOGY | Facility: CLINIC | Age: 24
End: 2024-02-23
Payer: COMMERCIAL

## 2024-02-23 NOTE — TELEPHONE ENCOUNTER
Called and talked with pt and his mom (Rain). Appointment reminder for 2/26 at 8:30 am in person, lab previously done on 1/31. Pt and mom in agreement.  Breanna Holland,  Nephrology Clinic Navigator  [facility]  Direct: [phone].

## 2024-02-26 ENCOUNTER — OFFICE VISIT (OUTPATIENT)
Dept: NEPHROLOGY | Facility: CLINIC | Age: 24
End: 2024-02-26
Attending: INTERNAL MEDICINE
Payer: COMMERCIAL

## 2024-02-26 VITALS
BODY MASS INDEX: 28.65 KG/M2 | WEIGHT: 199.7 LBS | OXYGEN SATURATION: 98 % | DIASTOLIC BLOOD PRESSURE: 78 MMHG | SYSTOLIC BLOOD PRESSURE: 133 MMHG | HEART RATE: 54 BPM

## 2024-02-26 DIAGNOSIS — Q61.19 AUTOSOMAL RECESSIVE POLYCYSTIC KIDNEY DISEASE AND CONGENITAL HEPATIC FIBROSIS (ARPKD/CHF): Primary | ICD-10-CM

## 2024-02-26 PROCEDURE — 99214 OFFICE O/P EST MOD 30 MIN: CPT | Mod: GC | Performed by: INTERNAL MEDICINE

## 2024-02-26 PROCEDURE — 99213 OFFICE O/P EST LOW 20 MIN: CPT | Performed by: INTERNAL MEDICINE

## 2024-02-26 ASSESSMENT — PAIN SCALES - GENERAL: PAINLEVEL: NO PAIN (0)

## 2024-02-26 NOTE — LETTER
2024       RE: Solomon Wilcox  [address] [place] [facility] [address]     Dear Colleague,    Thank you for referring your patient, Solomon Wilcox, to the [facility]PHROLOG[facility] [place] [facility]. Please see a copy of my visit note below.      Nephrology Clinic Follow Up  24    Solomon Wilcox MRN:[id number] YOB: 2000  Date of Admission:(Not on file)  Primary care provider: Brando Yost  Requesting physician: Jayla Chapin, *      REASON FOR CONSULT: ARPKD, CKD stage 3       HISTORY OF PRESENT ILLNESS:    Solomon Wilcox is a 24 year old male with a history of ARPKD, CKD stage II, hypertension who presents with his mother for transfer of service. He was first diagnosed with ARPKD around the time of his birth. Both the mother and father are carriers of the disease. His mother notes her pregnancy with the patient was complicated with an emergency  and a pneumothorax  Liver cysts were discovered at the age of 1. Since then, his kidney function has been around 70% and has remained stable. Patient was very active with sports in high school. He was last evaluated by Dr. Ni in pediatric nephrology on 19 at which time he reported no new kidney concerns. Since then he has been doing well with no hospitalizations and no surgeries. He is not having abdominal or flank pain, headaches, gross hematuria. His energy is good. Patient typically exercises 4-5 times per week, mostly weight training. He is not taking any supplements. No history of kidney stones. No other health concerns. Patient has a history of gallbladder removal and had an averse reaction to the anesthetic. He currently attends college at [employer] and is studying environmental studies.      PAST MEDICAL HISTORY:  Reviewed with patient on 2024   As per HPI    MEDICATIONS:  Reviewed with the patient in detail    ALLERGIES:     Reviewed with the patient in detail    REVIEW OF SYSTEMS:  A comprehensive of systems was negative except as noted above.    SOCIAL HISTORY:   Reviewed with patient, no smoking and no alcohol use     FAMILY MEDICAL HISTORY:   Reviewed, no family history of need for dialysis, transplant or CKD    PHYSICAL EXAM:   Vital signs:/78   Pulse 54   Wt 90.6 kg (199 lb 11.2 oz)   SpO2 98%   BMI 28.65 kg/m      Physical Exam     Gen: Appears well  Neck: No JVD  Lungs: CTA  CVS: S1S2 normal, no murmurs heard  LE: no edema  Skin: no rashes  CNS: Grossly normal     Interval History: He has been doing well overall. He Checks blood pressures at home and run in 130-140's mm of Hg. He has been running a lot since last summer and has lost a lot of weight. He denies any symptoms today.    Interval history 2/16/23: he denies any new concerns and has been doing great. No new symptoms. Has a cat now which brings him a lot of bessy. He has been checking his blood pressures occasionally at home and have been in the range of high 130's/80-90's. He has been taking all his medications as he should.     7/10/23: He has been doing well overall. [place]. Denies any illnesses or new symptoms. He does drink a lot of water during the day. He has no questions or concerns for today's visit. He has been on hydrochlorothiazide 12.5 mg daily that was added the last time, and his blood pressures have been at goal of < 120/90 mm of Hg, most of the times when he checks them at home.     2/26/24: Patient reports no symptoms at this time. He denies any abd/flank pain, nausea, vomiting, hematuria, kidney stones or fever. BP is well controlled with current medication regimen; hydrochlorothiazide 12.5 daily, lisinopril 40 mg, and amlodipine 10 mg daily. Home BP ranges 120-130s. He drinks 6-8 bottles (32oz)of  water daily. He denies LE edema. He exercise regularly.     ASSESSMENT AND RECOMMENDATIONS:     #  ARPKD   #CKD stage 2  #Hypertension  #Hepatic fibrosis- Liver function is stable. Following with Dr. Goss    His CKD is due to ARPKD. Creatinine has been very stable over the past few years, baseline ~ 1.4-1.5 mg/dl .Mild proteinuria. No hematuria. Likely increase in creatinine due to increased muscle mass, he does frequent weight training. His blood pressures at home are currently in the systolics of 120's-130's and he denies any hypervolemia. He remains on Lisinopril 40 mg daily, hydrochlorothiazide 12.5 mg daily and amlodipine 10 mg daily. renin/adlo ratio was checked and it's not concerning for a hyperaldo state.   He does not have other metabolic complications of CKD.  -Continue with current treatment      Follow-up: 1 year    Jayla Chapin MD               Attestation signed by Jayla Chapin MD at 2/26/2024  9:34 AM:  I have seen and evaluated the patient. Discussed with the fellow and agree with fellow's findings and plan as documented in the fellow's note.  I have reviewed today's vital signs, notes, medications, labs and imaging.   Jayla Chapin MD, MD

## 2024-02-26 NOTE — NURSING NOTE
Chief Complaint   Patient presents with    RECHECK       /78   Pulse 54   Wt 90.6 kg (199 lb 11.2 oz)   SpO2 98%   BMI 28.65 kg/m      aMyo Healy on 2/26/2024 at 8:27 AM

## 2024-02-26 NOTE — PROGRESS NOTES
Nephrology Clinic Follow Up  24    Solomon Wilcox MRN:[id number] YOB: 2000  Date of Admission:(Not on file)  Primary care provider: Brando Yost  Requesting physician: Jayla Chapin, *      REASON FOR CONSULT: ARPKD, CKD stage 3       HISTORY OF PRESENT ILLNESS:    Solomon Wilcox is a 24 year old male with a history of ARPKD, CKD stage II, hypertension who presents with his mother for transfer of service. He was first diagnosed with ARPKD around the time of his birth. Both the mother and father are carriers of the disease. His mother notes her pregnancy with the patient was complicated with an emergency  and a pneumothorax  Liver cysts were discovered at the age of 1. Since then, his kidney function has been around 70% and has remained stable. Patient was very active with sports in high school. He was last evaluated by Dr. Ni in pediatric nephrology on 19 at which time he reported no new kidney concerns. Since then he has been doing well with no hospitalizations and no surgeries. He is not having abdominal or flank pain, headaches, gross hematuria. His energy is good. Patient typically exercises 4-5 times per week, mostly weight training. He is not taking any supplements. No history of kidney stones. No other health concerns. Patient has a history of gallbladder removal and had an averse reaction to the anesthetic. He currently attends college at [employer] and is studying environmental studies.      PAST MEDICAL HISTORY:  Reviewed with patient on 2024   As per HPI    MEDICATIONS:  Reviewed with the patient in detail    ALLERGIES:    Reviewed with the patient in detail    REVIEW OF SYSTEMS:  A comprehensive of systems was negative except as noted above.    SOCIAL HISTORY:   Reviewed with patient, no smoking and no alcohol use     FAMILY MEDICAL HISTORY:   Reviewed, no family history of need for dialysis, transplant or CKD    PHYSICAL EXAM:   Vital  signs:/78   Pulse 54   Wt 90.6 kg (199 lb 11.2 oz)   SpO2 98%   BMI 28.65 kg/m      Physical Exam     Gen: Appears well  Neck: No JVD  Lungs: CTA  CVS: S1S2 normal, no murmurs heard  LE: no edema  Skin: no rashes  CNS: Grossly normal     Interval History: He has been doing well overall. He Checks blood pressures at home and run in 130-140's mm of Hg. He has been running a lot since last summer and has lost a lot of weight. He denies any symptoms today.    Interval history 2/16/23: he denies any new concerns and has been doing great. No new symptoms. Has a cat now which brings him a lot of bessy. He has been checking his blood pressures occasionally at home and have been in the range of high 130's/80-90's. He has been taking all his medications as he should.     7/10/23: He has been doing well overall. [place] regularly this [place]. Denies any illnesses or new symptoms. He does drink a lot of water during the day. He has no questions or concerns for today's visit. He has been on hydrochlorothiazide 12.5 mg daily that was added the last time, and his blood pressures have been at goal of < 120/90 mm of Hg, most of the times when he checks them at home.     2/26/24: Patient reports no symptoms at this time. He denies any abd/flank pain, nausea, vomiting, hematuria, kidney stones or fever. BP is well controlled with current medication regimen; hydrochlorothiazide 12.5 daily, lisinopril 40 mg, and amlodipine 10 mg daily. Home BP ranges 120-130s. He drinks 6-8 bottles (32oz)of  water daily. He denies LE edema. He exercise regularly.     ASSESSMENT AND RECOMMENDATIONS:     # ARPKD   #CKD stage 2  #Hypertension  #Hepatic fibrosis- Liver function is stable. Following with Dr. Goss    His CKD is due to ARPKD. Creatinine has been very stable over the past few years, baseline ~ 1.4-1.5 mg/dl .Mild proteinuria. No hematuria. Likely increase in creatinine due to increased muscle mass, he does  frequent weight training. His blood pressures at home are currently in the systolics of 120's-130's and he denies any hypervolemia. He remains on Lisinopril 40 mg daily, hydrochlorothiazide 12.5 mg daily and amlodipine 10 mg daily. renin/adlo ratio was checked and it's not concerning for a hyperaldo state.   He does not have other metabolic complications of CKD.  -Continue with current treatment      Follow-up: 1 year    Jayla Chapin MD

## 2024-08-01 ENCOUNTER — TELEPHONE (OUTPATIENT)
Dept: NEPHROLOGY | Facility: CLINIC | Age: 24
End: 2024-08-01
Payer: COMMERCIAL

## 2024-08-01 NOTE — TELEPHONE ENCOUNTER
Left Voicemail (1st Attempt) and Sent Mychart (1st Attempt) for the patient to call back and schedule the following:    Appointment type: Return Nephrology  Provider: Any Provider  Return date: Approx 3/3/2025  Specialty phone number: [phone]  Additional appointment(s) needed: Labs Prior  Additonal Notes: Prev seen Dr. Chapin, needs 1yr follow up any provider

## 2024-08-05 ENCOUNTER — TELEPHONE (OUTPATIENT)
Dept: NEPHROLOGY | Facility: CLINIC | Age: 24
End: 2024-08-05
Payer: COMMERCIAL

## 2024-08-05 NOTE — TELEPHONE ENCOUNTER
Left Voicemail (2nd Attempt) and Sent Mychart (2nd Attempt) for the patient to call back and schedule the following:    Appointment type: Return Nephrology  Provider: Any  Return date: Approx 03/2025  Specialty phone number: [phone]  Additional appointment(s) needed: Labs prior  Additonal Notes: Prev seen Dr. Chapin, can see anyone

## 2024-09-05 ENCOUNTER — MYC MEDICAL ADVICE (OUTPATIENT)
Dept: GASTROENTEROLOGY | Facility: CLINIC | Age: 24
End: 2024-09-05
Payer: COMMERCIAL

## 2024-10-14 ENCOUNTER — MYC REFILL (OUTPATIENT)
Dept: GASTROENTEROLOGY | Facility: CLINIC | Age: 24
End: 2024-10-14
Payer: COMMERCIAL

## 2024-10-14 ENCOUNTER — MYC REFILL (OUTPATIENT)
Dept: NEPHROLOGY | Facility: CLINIC | Age: 24
End: 2024-10-14
Payer: COMMERCIAL

## 2024-10-14 DIAGNOSIS — Q61.3 POLYCYSTIC KIDNEY DISEASE: ICD-10-CM

## 2024-10-14 DIAGNOSIS — I15.8 OTHER SECONDARY HYPERTENSION: ICD-10-CM

## 2024-10-15 RX ORDER — HYDROCHLOROTHIAZIDE 12.5 MG/1
12.5 TABLET ORAL DAILY
Qty: 90 TABLET | Refills: 3 | Status: SHIPPED | OUTPATIENT
Start: 2024-10-15 | End: 2024-10-23

## 2024-10-15 RX ORDER — LISINOPRIL 40 MG/1
40 TABLET ORAL DAILY
Qty: 90 TABLET | Refills: 3 | Status: SHIPPED | OUTPATIENT
Start: 2024-10-15 | End: 2024-10-23

## 2024-10-15 RX ORDER — URSODIOL 300 MG/1
300 CAPSULE ORAL 2 TIMES DAILY
Qty: 180 CAPSULE | Refills: 0 | Status: SHIPPED | OUTPATIENT
Start: 2024-10-15 | End: 2024-10-25

## 2024-10-15 RX ORDER — AMLODIPINE BESYLATE 10 MG/1
10 TABLET ORAL AT BEDTIME
Qty: 90 TABLET | Refills: 3 | Status: SHIPPED | OUTPATIENT
Start: 2024-10-15 | End: 2024-10-23

## 2024-10-22 ENCOUNTER — TELEPHONE (OUTPATIENT)
Dept: NEPHROLOGY | Facility: CLINIC | Age: 24
End: 2024-10-22
Payer: COMMERCIAL

## 2024-10-22 DIAGNOSIS — I15.8 OTHER SECONDARY HYPERTENSION: ICD-10-CM

## 2024-10-22 NOTE — TELEPHONE ENCOUNTER
[facility] Call Center    Phone Message    May a detailed message be left on voicemail: yes     Reason for Call: Medication Question or concern regarding medication   Prescription Clarification  Name of Medication: lisinopril (ZESTRIL) 40 MG tablet   amLODIPine (NORVASC) 10 MG tablet  hydroCHLOROthiazide 12.5 MG tablet [[id number]]     Prescribing Provider: Anne   Pharmacy: [facility], [address] [place] [address] phone: [phone]   What on the order needs clarification? The [facility] pharmacy no longer accepts his insurance. Please transfer the prescription to [facility] asa. Thank you!       Action Taken: Message routed to:  [facility] Urology    Travel Screening: Not Applicable     Date of Service:

## 2024-10-23 RX ORDER — LISINOPRIL 40 MG/1
40 TABLET ORAL DAILY
Qty: 90 TABLET | Refills: 0 | Status: SHIPPED | OUTPATIENT
Start: 2024-10-23

## 2024-10-23 RX ORDER — HYDROCHLOROTHIAZIDE 12.5 MG/1
12.5 TABLET ORAL DAILY
Qty: 90 TABLET | Refills: 0 | Status: SHIPPED | OUTPATIENT
Start: 2024-10-23

## 2024-10-23 RX ORDER — AMLODIPINE BESYLATE 10 MG/1
10 TABLET ORAL AT BEDTIME
Qty: 90 TABLET | Refills: 0 | Status: SHIPPED | OUTPATIENT
Start: 2024-10-23

## 2024-10-25 ENCOUNTER — TELEPHONE (OUTPATIENT)
Dept: GASTROENTEROLOGY | Facility: CLINIC | Age: 24
End: 2024-10-25
Payer: COMMERCIAL

## 2024-10-25 DIAGNOSIS — Q61.3 POLYCYSTIC KIDNEY DISEASE: ICD-10-CM

## 2024-10-25 RX ORDER — URSODIOL 300 MG/1
300 CAPSULE ORAL 2 TIMES DAILY
Qty: 180 CAPSULE | Refills: 3 | Status: SHIPPED | OUTPATIENT
Start: 2024-10-25 | End: 2024-11-04

## 2024-10-25 NOTE — TELEPHONE ENCOUNTER
[facility] Call Center    Phone Message    May a detailed message be left on voicemail: yes     Reason for Call: Medication Refill Request    Has the patient contacted the pharmacy for the refill? Yes   Name of medication being requested: ursodiol (ACTIGALL) 300 MG capsule   Provider who prescribed the medication: Dr. Goss  Pharmacy: [facility][id number] [address]   Date medication is needed: ASAP       Action Taken: Message routed to:  [facility] Hepatology    Travel Screening: Not Applicable     Date of Service:                  Ursodiol refilled per protocol for 1 year.

## 2024-11-01 ENCOUNTER — TELEPHONE (OUTPATIENT)
Dept: GASTROENTEROLOGY | Facility: CLINIC | Age: 24
End: 2024-11-01
Payer: COMMERCIAL

## 2024-11-01 DIAGNOSIS — Q61.3 POLYCYSTIC KIDNEY DISEASE: ICD-10-CM

## 2024-11-01 NOTE — TELEPHONE ENCOUNTER
[facility] Call Center    Phone Message    May a detailed message be left on voicemail: yes     Reason for Call: Pt's mom calling in regards to pt's ursodiol (ACTIGALL) 300 MG capsule. They were told at the pharmacy that the pt's insurance does not cover the medication. Pt cannot afford the $300 per month.  Requesting a call back ASAP. Pt has enough medication left for 1 week.       Action Taken: Other: hepa     Travel Screening: Not Applicable     Date of Service:

## 2024-11-04 RX ORDER — URSODIOL 250 MG/1
250 TABLET, FILM COATED ORAL 2 TIMES DAILY
Qty: 180 TABLET | Refills: 0 | Status: SHIPPED | OUTPATIENT
Start: 2024-11-04

## 2024-11-04 NOTE — TELEPHONE ENCOUNTER
Returned call.    Ursodiol sent to [facility] in [employer] using a GoodRx coupon. Confirmed with pharmacy. Pharmacy reported ursodiol 250 mg tablet will be available tomorrow. Pharmacy will call mother to confirm.     AGNES Ulrich, RN, PHN  Hepatology Clinic  [facility]  [facility]

## 2025-01-02 ENCOUNTER — LAB (OUTPATIENT)
Dept: LAB | Facility: CLINIC | Age: 25
End: 2025-01-02
Payer: COMMERCIAL

## 2025-01-02 DIAGNOSIS — Q61.2 ADPKD (AUTOSOMAL DOMINANT POLYCYSTIC KIDNEY DISEASE): ICD-10-CM

## 2025-01-02 LAB
ALBUMIN MFR UR ELPH: 81.5 MG/DL
ALBUMIN SERPL BCG-MCNC: 4.5 G/DL (ref 3.5–5.2)
ALBUMIN UR-MCNC: 70 MG/DL
ALP SERPL-CCNC: 54 U/L (ref 40–150)
ALT SERPL W P-5'-P-CCNC: 33 U/L (ref 0–70)
ANION GAP SERPL CALCULATED.3IONS-SCNC: 10 MMOL/L (ref 7–15)
APPEARANCE UR: CLEAR
AST SERPL W P-5'-P-CCNC: 24 U/L (ref 0–45)
BILIRUB DIRECT SERPL-MCNC: <0.2 MG/DL (ref 0–0.3)
BILIRUB SERPL-MCNC: 0.7 MG/DL
BILIRUB UR QL STRIP: NEGATIVE
BUN SERPL-MCNC: 21.2 MG/DL (ref 6–20)
CALCIUM SERPL-MCNC: 9.4 MG/DL (ref 8.8–10.4)
CHLORIDE SERPL-SCNC: 106 MMOL/L (ref 98–107)
COLOR UR AUTO: ABNORMAL
CREAT SERPL-MCNC: 1.57 MG/DL (ref 0.67–1.17)
CREAT UR-MCNC: 81.8 MG/DL
CREAT UR-MCNC: 82.9 MG/DL
EGFRCR SERPLBLD CKD-EPI 2021: 62 ML/MIN/1.73M2
ERYTHROCYTE [DISTWIDTH] IN BLOOD BY AUTOMATED COUNT: 12.6 % (ref 10–15)
GLUCOSE SERPL-MCNC: 87 MG/DL (ref 70–99)
GLUCOSE UR STRIP-MCNC: NEGATIVE MG/DL
HCO3 SERPL-SCNC: 26 MMOL/L (ref 22–29)
HCT VFR BLD AUTO: 41.8 % (ref 40–53)
HGB BLD-MCNC: 14 G/DL (ref 13.3–17.7)
HGB UR QL STRIP: NEGATIVE
INR PPP: 0.97 (ref 0.85–1.15)
KETONES UR STRIP-MCNC: NEGATIVE MG/DL
LEUKOCYTE ESTERASE UR QL STRIP: NEGATIVE
MCH RBC QN AUTO: 28.9 PG (ref 26.5–33)
MCHC RBC AUTO-ENTMCNC: 33.5 G/DL (ref 31.5–36.5)
MCV RBC AUTO: 86 FL (ref 78–100)
MICROALBUMIN UR-MCNC: 563 MG/L
MICROALBUMIN/CREAT UR: 688.26 MG/G CR (ref 0–17)
MUCOUS THREADS #/AREA URNS LPF: PRESENT /LPF
NITRATE UR QL: NEGATIVE
PH UR STRIP: 6 [PH] (ref 5–7)
PHOSPHATE SERPL-MCNC: 3.2 MG/DL (ref 2.5–4.5)
PLATELET # BLD AUTO: 242 10E3/UL (ref 150–450)
POTASSIUM SERPL-SCNC: 4 MMOL/L (ref 3.4–5.3)
PROT SERPL-MCNC: 7.4 G/DL (ref 6.4–8.3)
PROT/CREAT 24H UR: 0.98 MG/MG CR (ref 0–0.2)
RBC # BLD AUTO: 4.85 10E6/UL (ref 4.4–5.9)
RBC URINE: 1 /HPF
SODIUM SERPL-SCNC: 142 MMOL/L (ref 135–145)
SP GR UR STRIP: 1.01 (ref 1–1.03)
UROBILINOGEN UR STRIP-MCNC: NORMAL MG/DL
WBC # BLD AUTO: 8.4 10E3/UL (ref 4–11)
WBC URINE: 1 /HPF

## 2025-01-02 PROCEDURE — 80053 COMPREHEN METABOLIC PANEL: CPT | Performed by: PATHOLOGY

## 2025-01-02 PROCEDURE — 82248 BILIRUBIN DIRECT: CPT | Performed by: PATHOLOGY

## 2025-01-02 PROCEDURE — 85027 COMPLETE CBC AUTOMATED: CPT | Performed by: PATHOLOGY

## 2025-01-02 PROCEDURE — 85610 PROTHROMBIN TIME: CPT | Performed by: PATHOLOGY

## 2025-01-02 PROCEDURE — 82043 UR ALBUMIN QUANTITATIVE: CPT | Performed by: INTERNAL MEDICINE

## 2025-01-02 PROCEDURE — 84100 ASSAY OF PHOSPHORUS: CPT | Performed by: PATHOLOGY

## 2025-01-02 PROCEDURE — 81001 URINALYSIS AUTO W/SCOPE: CPT | Performed by: PATHOLOGY

## 2025-01-02 PROCEDURE — 84156 ASSAY OF PROTEIN URINE: CPT | Performed by: PATHOLOGY

## 2025-01-02 PROCEDURE — 99000 SPECIMEN HANDLING OFFICE-LAB: CPT | Performed by: PATHOLOGY

## 2025-01-02 PROCEDURE — 36415 COLL VENOUS BLD VENIPUNCTURE: CPT | Performed by: PATHOLOGY

## 2025-01-06 ENCOUNTER — OFFICE VISIT (OUTPATIENT)
Dept: GASTROENTEROLOGY | Facility: CLINIC | Age: 25
End: 2025-01-06
Attending: INTERNAL MEDICINE
Payer: COMMERCIAL

## 2025-01-06 VITALS
OXYGEN SATURATION: 97 % | HEART RATE: 61 BPM | WEIGHT: 213.9 LBS | BODY MASS INDEX: 30.69 KG/M2 | TEMPERATURE: 98.4 F | SYSTOLIC BLOOD PRESSURE: 151 MMHG | DIASTOLIC BLOOD PRESSURE: 107 MMHG

## 2025-01-06 PROCEDURE — 99215 OFFICE O/P EST HI 40 MIN: CPT | Mod: GC | Performed by: INTERNAL MEDICINE

## 2025-01-06 PROCEDURE — 99214 OFFICE O/P EST MOD 30 MIN: CPT | Performed by: INTERNAL MEDICINE

## 2025-01-06 RX ORDER — URSODIOL 250 MG/1
250 TABLET, FILM COATED ORAL 2 TIMES DAILY
Qty: 180 TABLET | Refills: 3 | Status: SHIPPED | OUTPATIENT
Start: 2025-01-06

## 2025-01-06 ASSESSMENT — PAIN SCALES - GENERAL: PAINLEVEL_OUTOF10: NO PAIN (0)

## 2025-01-06 NOTE — PROGRESS NOTES
Hepatology Follow-Up Visit:       Follow up for congenital hepatic fibrosis     Last visit: 1/31/24    HISTORY OF PRESENT ILLNESS:   24 year old male with congenital hepatic fibrosis and polycystic kidney disease, but has never required kidney transplantation or dialysis.     Solomon reports he's been doing well since last visit 1 year ago. His only concern today is the cost of his ursodiol since switching to a different insurance after turning 24 (reports this was $400 for a 1 month supply). Despite this, he has not missed any doses and does have a supply remaining. No abdominal pain/distension, LE edema, nausea, vomiting, changes in bowel movements, or confusion. Reports that he checks his BP at home with SBP readings in the 130s-140s, which he says is typical for him. Continues taking all his BP medications with no missed doses. Mom wonders about any utility for repeat liver ultrasound at this time. Solomon has no other concerns or questions today.     Review of systems  A targeted GI review of systems complete and is otherwise negative.     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, 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    Liver disease, cystic, congenital     Short stature     Treated with growth hormone    Unspecified hypertensive kidney disease with chronic kidney disease stage I through stage IV, or unspecified(403.90)        Family History:   Family History   Problem Relation Age of Onset    Hypertension Father         started in his 20s    Lipids Father         high cholesterol    Myocardial  Infarction Paternal Grandfather        Social History:   Social History     Tobacco Use    Smoking status: Never    Smokeless tobacco: Never   Substance Use Topics    Alcohol use: Yes     Comment: occ       Medications:   Current Outpatient Medications   Medication Sig Dispense Refill    amLODIPine (NORVASC) 10 MG tablet Take 1 tablet (10 mg) by mouth at bedtime. 90 tablet 0    cholecalciferol (VITAMIN  -D) 1000 UNITS capsule Take 1 capsule by mouth daily      cyanocolbalamin (VITAMIN  B-12) 500 MCG tablet Take 500 mcg by mouth daily      hydroCHLOROthiazide 12.5 MG tablet Take 1 tablet (12.5 mg) by mouth daily. 90 tablet 0    lisinopril (ZESTRIL) 40 MG tablet Take 1 tablet (40 mg) by mouth daily. 90 tablet 0    ursodiol (ACTIGALL) 250 MG tablet Take 1 tablet (250 mg) by mouth 2 times daily. 180 tablet 3    ursodiol (ACTIGALL) 250 MG tablet Take 1 tablet (250 mg) by mouth 2 times daily. 180 tablet 0    guanFACINE HCl (INTUNIV) 3 MG TB24 24 hr tablet Take 1 tablet by mouth daily. (Patient not taking: Reported on 1/6/2025)       Current Facility-Administered Medications   Medication Dose Route Frequency Provider Last Rate Last Admin    EPINEPHrine (ADRENALIN) kit 0.3 mg  0.3 mg Intramuscular Q5 Min PRN Lan Goss MD            Allergies  Allergies   Allergen Reactions    No Clinical Screening - See Comments      Pt with hx of polycystic kidney disease  Please avoid ibuprofen       Vitals:   BP (!) 151/107 (BP Location: Right arm, Patient Position: Sitting, Cuff Size: Adult Large)   Pulse 61   Temp 98.4  F (36.9  C) (Oral)   Wt 97 kg (213 lb 14.4 oz)   SpO2 97%   BMI 30.69 kg/m    Body mass index is 30.69 kg/m .      Physical Exam:   Constitutional: Well-developed, well-nourished, in no apparent distress.    HEENT: Normocephalic. No scleral icterus. Moist oral mucosa. Dentition good.  Cardiac: Well perfused.   Respiratory: Breathing comfortably on room air.   GI:  Abdomen is soft, non-distended, non-tender.     Skin: Skin is warm and dry. No rash noted.  No jaundice. No spider nevi noted.  No palmar erythema  Musculoskeletal: ROM intact, Normal muscle bulk    Psychiatric: Normal mood and affect. Behavior is normal.  Neuro: No tremor      Labs:   Lab Results   Component Value Date     01/02/2025    POTASSIUM 4.0 01/02/2025    CHLORIDE 106 01/02/2025    ANIONGAP 10 01/02/2025    CO2 26 01/02/2025    BUN 21.2 (H) 01/02/2025    CR 1.57 (H) 01/02/2025    GFRESTIMATED 62 01/02/2025    PITO 9.4 01/02/2025      Lab Results   Component Value Date    WBC 8.4 01/02/2025    HGB 14.0 01/02/2025    HCT 41.8 01/02/2025    MCV 86 01/02/2025    MCH 28.9 01/02/2025    MCHC 33.5 01/02/2025    RDW 12.6 01/02/2025     01/02/2025     Lab Results   Component Value Date    ALBUMIN 4.5 01/02/2025    ALKPHOS 54 01/02/2025    AST 24 01/02/2025    BILICONJ 0.0 02/14/2013     Lab Results   Component Value Date    INR 0.97 01/02/2025       MELD 3.0: 11 at 1/2/2025  3:12 PM  MELD-Na: 11 at 1/2/2025  3:12 PM  Calculated from:  Serum Creatinine: 1.57 mg/dL at 1/2/2025  3:12 PM  Serum Sodium: 142 mmol/L (Using max of 137 mmol/L) at 1/2/2025  3:12 PM  Total Bilirubin: 0.7 mg/dL (Using min of 1 mg/dL) at 1/2/2025  3:12 PM  Serum Albumin: 4.5 g/dL (Using max of 3.5 g/dL) at 1/2/2025  3:12 PM  INR(ratio): 0.97 (Using min of 1) at 1/2/2025  3:12 PM  Age at listing (hypothetical): 24 years  Sex: Male at 1/2/2025  3:12 PM    Procedures:  Liver biopsy: None  EGD: None  Colonoscopy: None    Relevant Imaging:   US Abdomen 7/2019  IMPRESSION:   1. Intrahepatic ductal dilatation, stable from prior exam. Doppler  evaluation as normal.  2. Polycystic kidney disease.    MRCP 8/2017  IMPRESSION:  1. 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 Subtle heterogeneous enhancement of the right hepatic  lobe could represent mild fibrosis, but otherwise there is  no evidence  of significant fibrosis, cirrhosis, cholangitis, or portal  hypertension.   2. New cystic collection in the anterior spleen.  3. Stable mild dilatation of the extrahepatic bile duct.  4. Multiple mildly prominent retroperitoneal and peripancreatic lymph  nodes, likely reactive.      ASSESSMENT/PLAN:  24 year old male with congential hepatic fibrosis and polycystic kidney disease. He continues to display no signs of portal hypertension. His INR, platelets, LFTs, ALP, tbili and albumin are all within normal limits. His kidney function however has changed over the last 1 year; his urine protein/Cr ratio is 0.98 from 0.54, and urine albumin/Cr ratio is 688 from 373. Cr remains relatively stable at 1.57 from 1.51. He has follow up with Nephrology later this month.     Most recent imaging of the liver was with US in 2019, showing stable intrahepatic ductal dilation with normal Doppler. Upon further review of prior imaging, patient had MRCP in 2017 revealing cystic dilatation with the largest area measuring 9.1 x 6 cm. There was previous consideration of cholangiocarcinoma/HCC surveillance with CA 19-9 and AFP. Will discuss patient's case at upcoming conference to discuss whether this magnitude of cystic dilation would .     Continues on ursodiol. Sent prescription for 250 mg BID to [facility] today given that this became cost prohibitive with insurance change.     Return to Clinic: 1 year      Thank you very much for allowing me to participate in the care of this patient.  If you have any questions regarding my recommendations, please do not hesitate to contact me.      Patient discussed and seen with Staff Dr. Goss, who is in agreement with the above.     Inez Garnica MD  Internal Medicine-Pediatrics PGY1

## 2025-01-06 NOTE — LETTER
1/6/2025      Solomon Wilcox  [address]  [facility] [address]      Dear Colleague,    Thank you for referring your patient, Solomon Wilcox, to the [facility] [place]. Please see a copy of my visit note below.    Hepatology Follow-Up Visit:       Follow up for congenital hepatic fibrosis     Last visit: 1/31/24    HISTORY OF PRESENT ILLNESS:   24 year old male with congenital hepatic fibrosis and polycystic kidney disease, but has never required kidney transplantation or dialysis.     Solomon reports he's been doing well since last visit 1 year ago. His only concern today is the cost of his ursodiol since switching to a different insurance after turning 24 (reports this was $400 for a 1 month supply). Despite this, he has not missed any doses and does have a supply remaining. No abdominal pain/distension, LE edema, nausea, vomiting, changes in bowel movements, or confusion. Reports that he checks his BP at home with SBP readings in the 130s-140s, which he says is typical for him. Continues taking all his BP medications with no missed doses. Mom wonders about any utility for repeat liver ultrasound at this time. Solomon has no other concerns or questions today.     Review of systems  A targeted GI review of systems complete and is otherwise negative.     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     Liver disease, cystic, congenital      Short stature     Treated with growth hormone     Unspecified hypertensive kidney  disease with chronic kidney disease stage I through stage IV, or unspecified(403.90)        Family History:   Family History   Problem Relation Age of Onset     Hypertension Father         started in his 20s     Lipids Father         high cholesterol     Myocardial Infarction Paternal Grandfather        Social History:   Social History     Tobacco Use     Smoking status: Never     Smokeless tobacco: Never   Substance Use Topics     Alcohol use: Yes     Comment: occ       Medications:   Current Outpatient Medications   Medication Sig Dispense Refill     amLODIPine (NORVASC) 10 MG tablet Take 1 tablet (10 mg) by mouth at bedtime. 90 tablet 0     cholecalciferol (VITAMIN  -D) 1000 UNITS capsule Take 1 capsule by mouth daily       cyanocolbalamin (VITAMIN  B-12) 500 MCG tablet Take 500 mcg by mouth daily       hydroCHLOROthiazide 12.5 MG tablet Take 1 tablet (12.5 mg) by mouth daily. 90 tablet 0     lisinopril (ZESTRIL) 40 MG tablet Take 1 tablet (40 mg) by mouth daily. 90 tablet 0     ursodiol (ACTIGALL) 250 MG tablet Take 1 tablet (250 mg) by mouth 2 times daily. 180 tablet 3     ursodiol (ACTIGALL) 250 MG tablet Take 1 tablet (250 mg) by mouth 2 times daily. 180 tablet 0     guanFACINE HCl (INTUNIV) 3 MG TB24 24 hr tablet Take 1 tablet by mouth daily. (Patient not taking: Reported on 1/6/2025)       Current Facility-Administered Medications   Medication Dose Route Frequency Provider Last Rate Last Admin     EPINEPHrine (ADRENALIN) kit 0.3 mg  0.3 mg Intramuscular Q5 Min PRN Lan Goss MD            Allergies  Allergies   Allergen Reactions     No Clinical Screening - See Comments      Pt with hx of polycystic kidney disease  Please avoid ibuprofen       Vitals:   BP (!) 151/107 (BP Location: Right arm, Patient Position: Sitting, Cuff Size: Adult Large)   Pulse 61   Temp 98.4  F (36.9  C) (Oral)   Wt 97 kg (213 lb 14.4 oz)   SpO2 97%   BMI 30.69 kg/m    Body mass index is 30.69 kg/m .      Physical Exam:    Constitutional: Well-developed, well-nourished, in no apparent distress.    HEENT: Normocephalic. No scleral icterus. Moist oral mucosa. Dentition good.  Cardiac: Well perfused.   Respiratory: Breathing comfortably on room air.   GI:  Abdomen is soft, non-distended, non-tender.    Skin: Skin is warm and dry. No rash noted.  No jaundice. No spider nevi noted.  No palmar erythema  Musculoskeletal: ROM intact, Normal muscle bulk    Psychiatric: Normal mood and affect. Behavior is normal.  Neuro: No tremor      Labs:   Lab Results   Component Value Date     01/02/2025    POTASSIUM 4.0 01/02/2025    CHLORIDE 106 01/02/2025    ANIONGAP 10 01/02/2025    CO2 26 01/02/2025    BUN 21.2 (H) 01/02/2025    CR 1.57 (H) 01/02/2025    GFRESTIMATED 62 01/02/2025    PITO 9.4 01/02/2025      Lab Results   Component Value Date    WBC 8.4 01/02/2025    HGB 14.0 01/02/2025    HCT 41.8 01/02/2025    MCV 86 01/02/2025    MCH 28.9 01/02/2025    MCHC 33.5 01/02/2025    RDW 12.6 01/02/2025     01/02/2025     Lab Results   Component Value Date    ALBUMIN 4.5 01/02/2025    ALKPHOS 54 01/02/2025    AST 24 01/02/2025    BILICONJ 0.0 02/14/2013     Lab Results   Component Value Date    INR 0.97 01/02/2025       MELD 3.0: 11 at 1/2/2025  3:12 PM  MELD-Na: 11 at 1/2/2025  3:12 PM  Calculated from:  Serum Creatinine: 1.57 mg/dL at 1/2/2025  3:12 PM  Serum Sodium: 142 mmol/L (Using max of 137 mmol/L) at 1/2/2025  3:12 PM  Total Bilirubin: 0.7 mg/dL (Using min of 1 mg/dL) at 1/2/2025  3:12 PM  Serum Albumin: 4.5 g/dL (Using max of 3.5 g/dL) at 1/2/2025  3:12 PM  INR(ratio): 0.97 (Using min of 1) at 1/2/2025  3:12 PM  Age at listing (hypothetical): 24 years  Sex: Male at 1/2/2025  3:12 PM    Procedures:  Liver biopsy: None  EGD: None  Colonoscopy: None    Relevant Imaging:   US Abdomen 7/2019  IMPRESSION:   1. Intrahepatic ductal dilatation, stable from prior exam. Doppler  evaluation as normal.  2. Polycystic kidney disease.    MRCP  8/2017  IMPRESSION:  1. 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 Subtle heterogeneous enhancement of the right hepatic  lobe could represent mild fibrosis, but otherwise there is no evidence  of significant fibrosis, cirrhosis, cholangitis, or portal  hypertension.   2. New cystic collection in the anterior spleen.  3. Stable mild dilatation of the extrahepatic bile duct.  4. Multiple mildly prominent retroperitoneal and peripancreatic lymph  nodes, likely reactive.      ASSESSMENT/PLAN:  24 year old male with congential hepatic fibrosis and polycystic kidney disease. He continues to display no signs of portal hypertension. His INR, platelets, LFTs, ALP, tbili and albumin are all within normal limits. His kidney function however has changed over the last 1 year; his urine protein/Cr ratio is 0.98 from 0.54, and urine albumin/Cr ratio is 688 from 373. Cr remains relatively stable at 1.57 from 1.51. He has follow up with Nephrology later this month.     Most recent imaging of the liver was with US in 2019, showing stable intrahepatic ductal dilation with normal Doppler. Upon further review of prior imaging, patient had MRCP in 2017 revealing cystic dilatation with the largest area measuring 9.1 x 6 cm. There was previous consideration of cholangiocarcinoma/HCC surveillance with CA 19-9 and AFP. Will discuss patient's case at upcoming conference to discuss whether this magnitude of cystic dilation would .     Continues on ursodiol. Sent prescription for 250 mg BID to [facility] today given that this became cost prohibitive with insurance change.     Return to Clinic: 1 year      Thank you very much for allowing me to participate in the care of this patient.  If you have any questions regarding my recommendations, please do not hesitate to contact me.      Patient discussed and seen  with Staff Dr. Goss, who is in agreement with the above.     Inez Garnica MD  Internal Medicine-Pediatrics PGY1       Attestation signed by Lan Goss MD at 1/6/2025  5:19 PM:  The patient was seen and examined.  The above assessment and plan was developed jointly with the resident.    I did spend total of 40 minutes (on the date of the encounter), including 30 minutes of face-to-face clinic time including counseling. The rest of the time was spent in documentation and review of records.      Thank you very much for allowing me to participate in the care of this patient.  If you have any questions regarding my recommendations, please do not hesitate to contact me.      Sincerely,         Lan Goss MD      Professor of Medicine  [employer]      Executive Medical Director, Solid Organ Transplant Program  [facility]      Again, thank you for allowing me to participate in the care of your patient.        Sincerely,        Lan Goss MD    Electronically signed

## 2025-01-06 NOTE — NURSING NOTE
Chief Complaint   Patient presents with    RECHECK     RETURN LIVER - F/U // per MyChart         Vitals:    01/06/25 1512 01/06/25 1518   BP: (!) 148/98 (!) 151/107   BP Location: Right arm Right arm   Patient Position: Sitting Sitting   Cuff Size: Adult Large Adult Large   Pulse: 61    Temp: 98.4  F (36.9  C)    TempSrc: Oral    SpO2: 97%    Weight: 97 kg (213 lb 14.4 oz)        BP Readings from Last 3 Encounters:   01/06/25 (!) 151/107   02/26/24 133/78   01/31/24 (!) 146/85       BP (!) 151/107 (BP Location: Right arm, Patient Position: Sitting, Cuff Size: Adult Large)   Pulse 61   Temp 98.4  F (36.9  C) (Oral)   Wt 97 kg (213 lb 14.4 oz)   SpO2 97%   BMI 30.69 kg/m       Toi Escalante, [facility]

## 2025-02-01 ENCOUNTER — HEALTH MAINTENANCE LETTER (OUTPATIENT)
Age: 25
End: 2025-02-01

## 2025-02-04 DIAGNOSIS — I15.9 SECONDARY HYPERTENSION: ICD-10-CM

## 2025-02-04 DIAGNOSIS — Q61.2 ADPKD (AUTOSOMAL DOMINANT POLYCYSTIC KIDNEY DISEASE): Primary | ICD-10-CM

## 2025-02-10 ENCOUNTER — LAB (OUTPATIENT)
Dept: LAB | Facility: CLINIC | Age: 25
End: 2025-02-10
Payer: COMMERCIAL

## 2025-02-10 ENCOUNTER — OFFICE VISIT (OUTPATIENT)
Dept: NEPHROLOGY | Facility: CLINIC | Age: 25
End: 2025-02-10
Payer: COMMERCIAL

## 2025-02-10 VITALS
BODY MASS INDEX: 31.06 KG/M2 | WEIGHT: 216.5 LBS | DIASTOLIC BLOOD PRESSURE: 87 MMHG | TEMPERATURE: 97.8 F | OXYGEN SATURATION: 98 % | SYSTOLIC BLOOD PRESSURE: 131 MMHG | HEART RATE: 61 BPM

## 2025-02-10 DIAGNOSIS — Q61.2 ADPKD (AUTOSOMAL DOMINANT POLYCYSTIC KIDNEY DISEASE): ICD-10-CM

## 2025-02-10 DIAGNOSIS — N18.2 CHRONIC RENAL DISEASE, STAGE II: Primary | ICD-10-CM

## 2025-02-10 DIAGNOSIS — I15.9 SECONDARY HYPERTENSION: ICD-10-CM

## 2025-02-10 DIAGNOSIS — E55.9 VITAMIN D DEFICIENCY: ICD-10-CM

## 2025-02-10 LAB
ALBUMIN MFR UR ELPH: 57.2 MG/DL
ALBUMIN SERPL BCG-MCNC: 4.3 G/DL (ref 3.5–5.2)
ALP SERPL-CCNC: 50 U/L (ref 40–150)
ALT SERPL W P-5'-P-CCNC: 21 U/L (ref 0–70)
ANION GAP SERPL CALCULATED.3IONS-SCNC: 8 MMOL/L (ref 7–15)
AST SERPL W P-5'-P-CCNC: 16 U/L (ref 0–45)
BILIRUB SERPL-MCNC: 0.6 MG/DL
BUN SERPL-MCNC: 19.3 MG/DL (ref 6–20)
CALCIUM SERPL-MCNC: 9 MG/DL (ref 8.8–10.4)
CHLORIDE SERPL-SCNC: 108 MMOL/L (ref 98–107)
CREAT SERPL-MCNC: 1.56 MG/DL (ref 0.67–1.17)
CREAT UR-MCNC: 73.9 MG/DL
CREAT UR-MCNC: 75.9 MG/DL
EGFRCR SERPLBLD CKD-EPI 2021: 62 ML/MIN/1.73M2
GLUCOSE SERPL-MCNC: 96 MG/DL (ref 70–99)
HCO3 SERPL-SCNC: 27 MMOL/L (ref 22–29)
MICROALBUMIN UR-MCNC: 396 MG/L
MICROALBUMIN/CREAT UR: 535.86 MG/G CR (ref 0–17)
OSMOLALITY UR: 413 MMOL/KG (ref 100–1200)
POTASSIUM SERPL-SCNC: 4 MMOL/L (ref 3.4–5.3)
PROT SERPL-MCNC: 7 G/DL (ref 6.4–8.3)
PROT/CREAT 24H UR: 0.75 MG/MG CR (ref 0–0.2)
SODIUM SERPL-SCNC: 143 MMOL/L (ref 135–145)
VIT D+METAB SERPL-MCNC: 16 NG/ML (ref 20–50)

## 2025-02-10 PROCEDURE — 84156 ASSAY OF PROTEIN URINE: CPT | Performed by: PATHOLOGY

## 2025-02-10 PROCEDURE — 82306 VITAMIN D 25 HYDROXY: CPT

## 2025-02-10 PROCEDURE — 99000 SPECIMEN HANDLING OFFICE-LAB: CPT | Performed by: PATHOLOGY

## 2025-02-10 PROCEDURE — 36415 COLL VENOUS BLD VENIPUNCTURE: CPT | Performed by: PATHOLOGY

## 2025-02-10 PROCEDURE — 82570 ASSAY OF URINE CREATININE: CPT

## 2025-02-10 PROCEDURE — 82043 UR ALBUMIN QUANTITATIVE: CPT

## 2025-02-10 PROCEDURE — 99213 OFFICE O/P EST LOW 20 MIN: CPT

## 2025-02-10 PROCEDURE — 80053 COMPREHEN METABOLIC PANEL: CPT | Performed by: PATHOLOGY

## 2025-02-10 PROCEDURE — 83935 ASSAY OF URINE OSMOLALITY: CPT

## 2025-02-10 ASSESSMENT — PAIN SCALES - GENERAL: PAINLEVEL_OUTOF10: NO PAIN (0)

## 2025-02-10 NOTE — LETTER
2/10/2025       RE: Solomon Wilcox  [address]  [facility] [address]     Dear Colleague,    Thank you for referring your patient, Solomon Wilcox, to the [facility] [place] [facility]. Please see a copy of my visit note below.    Nephrology Clinic Visit 2/10/25    Assessment and Plan:    ARPCKD, CKD2 - Stable. Creat 1.5, eGFR 62 ml/mn, UPCR 0.7 mg/mgCr   - Baseline creat 1.4-1.5   - On max dose ACE   - SGLT2 not indicated for PCKD   - B/p control is unclear    2. CV/Volume - B/p appears controlled w/o edema. No regular home readings, but notes they run higher at home. Clinic readings today 128/90, 131/87. HR 61. Weight 216 #, from 199 # last visit.   Current regimen:    Amlodipine 10 mg every day   Lisinopril 40 mg every day   Hydrochlorothiazide 12.5 mg every day     - Solomon was asked to monitor b/p at home more closely. He was instructed on correct procedure for checking his b/p.      - B/P goal is < 130/.      - Recommended bringing in his device to be checked for accuracy     - Recommended that he contact me if his home b/ps are > 130/    3. Congenital Hepatic Fibrosis - Stable. Seen by Dr Goss 1/6/25. LFTs normal. On Ursodial    4. Electrolytes/Acid base - No acute concerns. K 4.0 Na 143    5. BMD - Ca 9.0 albumin 4.3   - Vit D 22 ( 2/23). Rechecking today   - PTH 43 ( 2/23). Will check next visit   - OK to continue Vit D 25 mcg every day    6. Heme - Hgb 14.    - Taking B12 daily    7. Dispo - RTC one year. Asked that he return sooner if he finds his b/ps to be > 130/ at home      Assessment and plan was discussed with patient and he voiced his understanding and agreement.    Reason for Visit:  CKD2    HPI:  Mr Wilcox is a 25 yo male with PMH significant for Autosomal Recessive Polycystic Kidney Disease, CKD2, HTN, Congenital Hepatic Fibrosis, Caroli disease, present today for routine CKD follow up.   Last seen in clinic  by Jesica Chapin/Henrry 2/26/24  Baseline creat 1.4-1.5    ROS:   The complete ROS is neg  Goes to the gym 3-4 times/week  Does not check b/ps regularly at home    Chronic Health Problems:  Autosomal Recessive Polycystic Kidney Disease, CKD2, HTN, Congenital Hepatic Fibrosis, Caroli disease, OCD, MARCIE, Defiant behavior, ADHD, Anxiety    Family Hx:   Family History   Problem Relation Age of Onset     Hypertension Father         started in his 20s     Lipids Father         high cholesterol     Myocardial Infarction Paternal Grandfather      Personal Hx:   Single, Employed in [employer], [place], ETOH none    Allergies:  Allergies   Allergen Reactions     No Clinical Screening - See Comments      Pt with hx of polycystic kidney disease  Please avoid ibuprofen       Medications:  Current Outpatient Medications   Medication Sig Dispense Refill     amLODIPine (NORVASC) 10 MG tablet Take 1 tablet (10 mg) by mouth at bedtime. 90 tablet 0     cholecalciferol (VITAMIN  -D) 1000 UNITS capsule Take 1 capsule by mouth daily       cyanocolbalamin (VITAMIN  B-12) 500 MCG tablet Take 500 mcg by mouth daily       hydroCHLOROthiazide 12.5 MG tablet Take 1 tablet (12.5 mg) by mouth daily. 90 tablet 0     lisinopril (ZESTRIL) 40 MG tablet Take 1 tablet (40 mg) by mouth daily. 90 tablet 0     ursodiol (ACTIGALL) 250 MG tablet Take 1 tablet (250 mg) by mouth 2 times daily. 180 tablet 3     ursodiol (ACTIGALL) 250 MG tablet Take 1 tablet (250 mg) by mouth 2 times daily. 180 tablet 0     Current Facility-Administered Medications   Medication Dose Route Frequency Provider Last Rate Last Admin     EPINEPHrine (ADRENALIN) kit 0.3 mg  0.3 mg Intramuscular Q5 Min PRN Lan Goss MD          Vitals:  /87   Pulse 61   Temp 97.8  F (36.6  C) (Oral)   Wt 98.2 kg (216 lb 8 oz)   SpO2 98%   BMI 31.06 kg/m    Repeat b/p 128/90    Exam:  GENERAL APPEARANCE: alert and no distress  RESP: lungs clear to auscultation  CV: regular rhythm, normal  rate  EDEMA: no LE edema bilaterally  ABDOMEN: Non distended  MS: extremities normal - no gross deformities noted  NEURO: A/O    LABS:   CMP  Recent Labs   Lab Test 02/10/25  0656 01/02/25  1512 01/31/24  0648 07/10/23  0906 02/14/22  1331 02/08/21  1544 01/21/21  1303 08/31/20  1432 08/07/20  1527    142 142 141   < > 142 138 140 140   POTASSIUM 4.0 4.0 4.1 4.0   < > 3.7 4.1 4.4 3.4   CHLORIDE 108* 106 105 105   < > 107 104 109 106   CO2 27 26 27 27   < > 28 30 27 28   ANIONGAP 8 10 10 9   < > 6 4 4 6   GLC 96 87 108* 98   < > 85 92 85 87   BUN 19.3 21.2* 13.3 15.6   < > 16 21 19 19   CR 1.56* 1.57* 1.51* 1.54*   < > 1.25 1.40* 1.44* 1.40*   GFRESTIMATED 62 62 65 64   < > 81 70 68 71   GFRESTBLACK  --   --   --   --   --  >90 82 79 82   PITO 9.0 9.4 9.3 9.4   < > 9.4 9.7 9.2 9.4    < > = values in this interval not displayed.     Recent Labs   Lab Test 02/10/25  0656 01/02/25  1512 01/31/24  0648 04/14/23  0934   BILITOTAL 0.6 0.7 0.6 0.3   ALKPHOS 50 54 54 51   ALT 21 33 16 19   AST 16 24 16 17     CBC  Recent Labs   Lab Test 01/02/25  1512 01/31/24  0648 07/10/23  0906 04/14/23  0934   HGB 14.0 15.0 14.2 14.0   WBC 8.4 6.2 5.6 7.0   RBC 4.85 5.15 4.91 4.90   HCT 41.8 45.0 42.5 42.1   MCV 86 87 87 86   MCH 28.9 29.1 28.9 28.6   MCHC 33.5 33.3 33.4 33.3   RDW 12.6 12.3 12.0 12.1    255 227 207     URINE STUDIES  Recent Labs   Lab Test 01/02/25  1513 01/31/24  0653 07/10/23  0910 02/16/23  1006   COLOR Straw Light Yellow Light Yellow Straw   APPEARANCE Clear Clear Clear Clear   URINEGLC Negative Negative Negative Negative   URINEBILI Negative Negative Negative Negative   URINEKETONE Negative Negative Negative Negative   SG 1.014 1.012 1.012 1.011   UBLD Negative Negative Negative Negative   URINEPH 6.0 6.0 6.0 5.5   PROTEIN 70* 70* 30* 20*   NITRITE Negative Negative Negative Negative   LEUKEST Negative Negative Negative Negative   RBCU 1 1 <1 1   WBCU 1 7* 2 2     Recent Labs   Lab Test 03/31/22  1504  01/21/21  1305 08/07/20  1532 05/05/20  1022   UTPG 0.60* 0.48* 0.46* 0.41*     PTH  Recent Labs   Lab Test 02/16/23  1002 03/31/22  1458 08/07/20  1527   PTHI 43 32 24     IRON STUDIES  Recent Labs   Lab Test 07/25/19  0902 02/01/19  1012 12/22/17  1207   ZEE 82 47 13*       Caitlin Styles NP        Again, thank you for allowing me to participate in the care of your patient.      Sincerely,    Caitlin Sytles NP

## 2025-02-10 NOTE — NURSING NOTE
Chief Complaint   Patient presents with    RECHECK       /87   Pulse 61   Temp 97.8  F (36.6  C) (Oral)   Wt 98.2 kg (216 lb 8 oz)   SpO2 98%   BMI 31.06 kg/m      Mayo Healy on 2/10/2025 at 8:14 AM

## 2025-02-10 NOTE — PROGRESS NOTES
Nephrology Clinic Visit 2/10/25    Assessment and Plan:    ARPCKD, CKD2 - Stable. Creat 1.5, eGFR 62 ml/mn, UPCR 0.7 mg/mgCr   - Baseline creat 1.4-1.5   - On max dose ACE   - SGLT2 not indicated for PCKD   - B/p control is unclear    2. CV/Volume - B/p appears controlled w/o edema. No regular home readings, but notes they run higher at home. Clinic readings today 128/90, 131/87. HR 61. Weight 216 #, from 199 # last visit.   Current regimen:    Amlodipine 10 mg every day   Lisinopril 40 mg every day   Hydrochlorothiazide 12.5 mg every day     - Solomon was asked to monitor b/p at home more closely. He was instructed on correct procedure for checking his b/p.      - B/P goal is < 130/.      - Recommended bringing in his device to be checked for accuracy     - Recommended that he contact me if his home b/ps are > 130/    3. Congenital Hepatic Fibrosis - Stable. Seen by Dr Goss 1/6/25. LFTs normal. On Ursodial    4. Electrolytes/Acid base - No acute concerns. K 4.0 Na 143    5. BMD - Ca 9.0 albumin 4.3   - Vit D 22 ( 2/23). Rechecking today   - PTH 43 ( 2/23). Will check next visit   - OK to continue Vit D 25 mcg every day    6. Heme - Hgb 14.    - Taking B12 daily    7. Dispo - RTC one year. Asked that he return sooner if he finds his b/ps to be > 130/ at home      Assessment and plan was discussed with patient and he voiced his understanding and agreement.    Reason for Visit:  CKD2    HPI:  Mr Wilcox is a 23 yo male with PMH significant for Autosomal Recessive Polycystic Kidney Disease, CKD2, HTN, Congenital Hepatic Fibrosis, Caroli disease, present today for routine CKD follow up.   Last seen in clinic by Jesica Chapin/Henrry 2/26/24  Baseline creat 1.4-1.5    ROS:   The complete ROS is neg  Goes to the gym 3-4 times/week  Does not check b/ps regularly at home    Chronic Health Problems:  Autosomal Recessive Polycystic Kidney Disease, CKD2, HTN, Congenital Hepatic Fibrosis, Caroli disease, OCD, MARCIE, Defiant behavior,  ADHD, Anxiety    Family Hx:   Family History   Problem Relation Age of Onset    Hypertension Father         started in his 20s    Lipids Father         high cholesterol    Myocardial Infarction Paternal Grandfather      Personal Hx:   Single, Employed in [employer], [place], ETOH none    Allergies:  Allergies   Allergen Reactions    No Clinical Screening - See Comments      Pt with hx of polycystic kidney disease  Please avoid ibuprofen       Medications:  Current Outpatient Medications   Medication Sig Dispense Refill    amLODIPine (NORVASC) 10 MG tablet Take 1 tablet (10 mg) by mouth at bedtime. 90 tablet 0    cholecalciferol (VITAMIN  -D) 1000 UNITS capsule Take 1 capsule by mouth daily      cyanocolbalamin (VITAMIN  B-12) 500 MCG tablet Take 500 mcg by mouth daily      hydroCHLOROthiazide 12.5 MG tablet Take 1 tablet (12.5 mg) by mouth daily. 90 tablet 0    lisinopril (ZESTRIL) 40 MG tablet Take 1 tablet (40 mg) by mouth daily. 90 tablet 0    ursodiol (ACTIGALL) 250 MG tablet Take 1 tablet (250 mg) by mouth 2 times daily. 180 tablet 3    ursodiol (ACTIGALL) 250 MG tablet Take 1 tablet (250 mg) by mouth 2 times daily. 180 tablet 0     Current Facility-Administered Medications   Medication Dose Route Frequency Provider Last Rate Last Admin    EPINEPHrine (ADRENALIN) kit 0.3 mg  0.3 mg Intramuscular Q5 Min PRN Lan Goss MD          Vitals:  /87   Pulse 61   Temp 97.8  F (36.6  C) (Oral)   Wt 98.2 kg (216 lb 8 oz)   SpO2 98%   BMI 31.06 kg/m    Repeat b/p 128/90    Exam:  GENERAL APPEARANCE: alert and no distress  RESP: lungs clear to auscultation  CV: regular rhythm, normal rate  EDEMA: no LE edema bilaterally  ABDOMEN: Non distended  MS: extremities normal - no gross deformities noted  NEURO: A/O    LABS:   CMP  Recent Labs   Lab Test 02/10/25  0656 01/02/25  1512 01/31/24  0648 07/10/23  0906 02/14/22  1331 02/08/21  1544 01/21/21  1303 08/31/20  1432 08/07/20  1527    142 142 141   < > 142 138  140 140   POTASSIUM 4.0 4.0 4.1 4.0   < > 3.7 4.1 4.4 3.4   CHLORIDE 108* 106 105 105   < > 107 104 109 106   CO2 27 26 27 27   < > 28 30 27 28   ANIONGAP 8 10 10 9   < > 6 4 4 6   GLC 96 87 108* 98   < > 85 92 85 87   BUN 19.3 21.2* 13.3 15.6   < > 16 21 19 19   CR 1.56* 1.57* 1.51* 1.54*   < > 1.25 1.40* 1.44* 1.40*   GFRESTIMATED 62 62 65 64   < > 81 70 68 71   GFRESTBLACK  --   --   --   --   --  >90 82 79 82   PITO 9.0 9.4 9.3 9.4   < > 9.4 9.7 9.2 9.4    < > = values in this interval not displayed.     Recent Labs   Lab Test 02/10/25  0656 01/02/25  1512 01/31/24  0648 04/14/23  0934   BILITOTAL 0.6 0.7 0.6 0.3   ALKPHOS 50 54 54 51   ALT 21 33 16 19   AST 16 24 16 17     CBC  Recent Labs   Lab Test 01/02/25  1512 01/31/24  0648 07/10/23  0906 04/14/23  0934   HGB 14.0 15.0 14.2 14.0   WBC 8.4 6.2 5.6 7.0   RBC 4.85 5.15 4.91 4.90   HCT 41.8 45.0 42.5 42.1   MCV 86 87 87 86   MCH 28.9 29.1 28.9 28.6   MCHC 33.5 33.3 33.4 33.3   RDW 12.6 12.3 12.0 12.1    255 227 207     URINE STUDIES  Recent Labs   Lab Test 01/02/25  1513 01/31/24  0653 07/10/23  0910 02/16/23  1006   COLOR Straw Light Yellow Light Yellow Straw   APPEARANCE Clear Clear Clear Clear   URINEGLC Negative Negative Negative Negative   URINEBILI Negative Negative Negative Negative   URINEKETONE Negative Negative Negative Negative   SG 1.014 1.012 1.012 1.011   UBLD Negative Negative Negative Negative   URINEPH 6.0 6.0 6.0 5.5   PROTEIN 70* 70* 30* 20*   NITRITE Negative Negative Negative Negative   LEUKEST Negative Negative Negative Negative   RBCU 1 1 <1 1   WBCU 1 7* 2 2     Recent Labs   Lab Test 03/31/22  1504 01/21/21  1305 08/07/20  1532 05/05/20  1022   UTPG 0.60* 0.48* 0.46* 0.41*     PTH  Recent Labs   Lab Test 02/16/23  1002 03/31/22  1458 08/07/20  1527   PTHI 43 32 24     IRON STUDIES  Recent Labs   Lab Test 07/25/19  0902 02/01/19  1012 12/22/17  1207   ZEE 82 47 13*       Caitlin Styles, NP

## 2025-04-20 ENCOUNTER — MYC REFILL (OUTPATIENT)
Dept: NEPHROLOGY | Facility: CLINIC | Age: 25
End: 2025-04-20
Payer: COMMERCIAL

## 2025-04-20 DIAGNOSIS — I15.8 OTHER SECONDARY HYPERTENSION: ICD-10-CM

## 2025-04-21 RX ORDER — LISINOPRIL 40 MG/1
40 TABLET ORAL DAILY
Qty: 90 TABLET | Refills: 3 | Status: SHIPPED | OUTPATIENT
Start: 2025-04-21